# Patient Record
Sex: FEMALE | Race: WHITE | Employment: FULL TIME | URBAN - METROPOLITAN AREA
[De-identification: names, ages, dates, MRNs, and addresses within clinical notes are randomized per-mention and may not be internally consistent; named-entity substitution may affect disease eponyms.]

---

## 2018-03-08 LAB — PAP SMEAR: NORMAL

## 2018-09-12 ENCOUNTER — TELEPHONE (OUTPATIENT)
Dept: FAMILY MEDICINE CLINIC | Age: 53
End: 2018-09-12

## 2018-10-04 ENCOUNTER — OFFICE VISIT (OUTPATIENT)
Dept: FAMILY MEDICINE CLINIC | Age: 53
End: 2018-10-04
Payer: COMMERCIAL

## 2018-10-04 VITALS
OXYGEN SATURATION: 98 % | HEIGHT: 68 IN | TEMPERATURE: 97.2 F | DIASTOLIC BLOOD PRESSURE: 74 MMHG | BODY MASS INDEX: 28.49 KG/M2 | WEIGHT: 188 LBS | HEART RATE: 70 BPM | RESPIRATION RATE: 12 BRPM | SYSTOLIC BLOOD PRESSURE: 132 MMHG

## 2018-10-04 DIAGNOSIS — L72.3 SEBACEOUS CYST: ICD-10-CM

## 2018-10-04 DIAGNOSIS — Z13.220 SCREENING, LIPID: ICD-10-CM

## 2018-10-04 DIAGNOSIS — M50.30 DDD (DEGENERATIVE DISC DISEASE), CERVICAL: ICD-10-CM

## 2018-10-04 DIAGNOSIS — R73.9 HYPERGLYCEMIA: ICD-10-CM

## 2018-10-04 DIAGNOSIS — F41.8 DEPRESSION WITH ANXIETY: ICD-10-CM

## 2018-10-04 DIAGNOSIS — Z00.00 ROUTINE GENERAL MEDICAL EXAMINATION AT A HEALTH CARE FACILITY: Primary | ICD-10-CM

## 2018-10-04 DIAGNOSIS — K21.9 GASTROESOPHAGEAL REFLUX DISEASE WITHOUT ESOPHAGITIS: ICD-10-CM

## 2018-10-04 DIAGNOSIS — R87.610 ASCUS OF CERVIX WITH NEGATIVE HIGH RISK HPV: ICD-10-CM

## 2018-10-04 DIAGNOSIS — K58.2 IRRITABLE BOWEL SYNDROME WITH BOTH CONSTIPATION AND DIARRHEA: ICD-10-CM

## 2018-10-04 PROCEDURE — 99386 PREV VISIT NEW AGE 40-64: CPT | Performed by: FAMILY MEDICINE

## 2018-10-04 RX ORDER — HYOSCYAMINE SULFATE 0.125 MG
125 TABLET ORAL EVERY 4 HOURS PRN
COMMUNITY

## 2018-10-04 RX ORDER — M-VIT,TX,IRON,MINS/CALC/FOLIC 27MG-0.4MG
1 TABLET ORAL DAILY
COMMUNITY

## 2018-10-04 RX ORDER — LORAZEPAM 0.5 MG/1
0.5 TABLET ORAL EVERY 6 HOURS PRN
COMMUNITY
End: 2019-04-05 | Stop reason: SDUPTHER

## 2018-10-04 RX ORDER — CHLORAL HYDRATE 500 MG
3000 CAPSULE ORAL 2 TIMES DAILY
COMMUNITY

## 2018-10-04 RX ORDER — ESCITALOPRAM OXALATE 5 MG/1
5 TABLET ORAL DAILY
COMMUNITY
End: 2019-01-24 | Stop reason: SDUPTHER

## 2018-10-04 ASSESSMENT — PATIENT HEALTH QUESTIONNAIRE - PHQ9
2. FEELING DOWN, DEPRESSED OR HOPELESS: 0
1. LITTLE INTEREST OR PLEASURE IN DOING THINGS: 0
SUM OF ALL RESPONSES TO PHQ QUESTIONS 1-9: 0
SUM OF ALL RESPONSES TO PHQ9 QUESTIONS 1 & 2: 0
SUM OF ALL RESPONSES TO PHQ QUESTIONS 1-9: 0

## 2018-10-04 NOTE — PROGRESS NOTES
Here for initial eval, recently started working at [facility] with Dr. Yoan Greene and Dr. Randy Omer. Pt is overall doing well but dealing with moderate stressors as she is currently undergoing process of divorce and dealing with spousal support issues. Pt has been on lexapro chronically, and ativan prn. Pt struggles to sleep, and worse with staying asleep. Pt uses ativan 0.5mg prn and is helpful. Pt on lexapro 5mg and does find helpful. Pt was on brand wellbutrin. Except as noted in the history of present illness as above, the review of systems is negative for the following:    General ROS: negative  Psychological ROS: negative  Allergy and Immunology ROS: negative  Hematological and Lymphatic ROS: negative  Respiratory ROS: no cough, shortness of breath, or wheezing  Cardiovascular ROS: no chest pain or dyspnea on exertion  Gastrointestinal ROS: no abdominal pain, change in bowel habits, or black or bloody stools  Genito-Urinary ROS: no dysuria, trouble voiding, or hematuria  Musculoskeletal ROS: negative  Dermatological ROS: negative      Past medical, surgical, and social history reviewed and updated. Medications and allergies reviewed and updated      /74   Pulse 70   Temp 97.2 °F (36.2 °C) (Oral)   Resp 12   Ht 5' 8.25\" (1.734 m)   Wt 188 lb (85.3 kg)   SpO2 98%   Breastfeeding? No   BMI 28.38 kg/m²   General appearance - healthy, alert, no distress  Skin - Skin color, texture, turgor normal. No rashes or lesions. No suspicious findings  Head - Normocephalic. No masses, lesions, tenderness or abnormalities  Eyes - conjunctivae/corneas clear. Pupils equal and reactive to light and accomodation, extraocular muscles intact. Ears - External ears normal. Canals clear. Tympanic membranes normal bilaterally. Nose/Sinuses - Nares normal. Septum midline. Mucosa normal. No drainage or sinus tenderness. Oropharynx - Lips, mucosa, and tongue normal. Teeth and gums normal.   Neck - Neck supple.  No

## 2018-11-16 DIAGNOSIS — R73.9 HYPERGLYCEMIA: ICD-10-CM

## 2018-11-16 DIAGNOSIS — Z13.220 SCREENING, LIPID: ICD-10-CM

## 2018-11-16 LAB
A/G RATIO: 2.2 (ref 1.1–2.2)
ALBUMIN SERPL-MCNC: 4.3 G/DL (ref 3.4–5)
ALP BLD-CCNC: 73 U/L (ref 40–129)
ALT SERPL-CCNC: 12 U/L (ref 10–40)
ANION GAP SERPL CALCULATED.3IONS-SCNC: 12 MMOL/L (ref 3–16)
AST SERPL-CCNC: 13 U/L (ref 15–37)
BILIRUB SERPL-MCNC: 0.4 MG/DL (ref 0–1)
BUN BLDV-MCNC: 13 MG/DL (ref 7–20)
CALCIUM SERPL-MCNC: 9.3 MG/DL (ref 8.3–10.6)
CHLORIDE BLD-SCNC: 104 MMOL/L (ref 99–110)
CHOLESTEROL, TOTAL: 179 MG/DL (ref 0–199)
CO2: 25 MMOL/L (ref 21–32)
CREAT SERPL-MCNC: 0.8 MG/DL (ref 0.6–1.1)
GFR AFRICAN AMERICAN: >60
GFR NON-AFRICAN AMERICAN: >60
GLOBULIN: 2 G/DL
GLUCOSE BLD-MCNC: 100 MG/DL (ref 70–99)
HCT VFR BLD CALC: 42.7 % (ref 36–48)
HDLC SERPL-MCNC: 49 MG/DL (ref 40–60)
HEMOGLOBIN: 14.5 G/DL (ref 12–16)
LDL CHOLESTEROL CALCULATED: 114 MG/DL
MCH RBC QN AUTO: 33.1 PG (ref 26–34)
MCHC RBC AUTO-ENTMCNC: 34 G/DL (ref 31–36)
MCV RBC AUTO: 97.5 FL (ref 80–100)
PDW BLD-RTO: 12.8 % (ref 12.4–15.4)
PLATELET # BLD: 206 K/UL (ref 135–450)
PMV BLD AUTO: 9.7 FL (ref 5–10.5)
POTASSIUM SERPL-SCNC: 4.2 MMOL/L (ref 3.5–5.1)
RBC # BLD: 4.37 M/UL (ref 4–5.2)
SODIUM BLD-SCNC: 141 MMOL/L (ref 136–145)
TOTAL PROTEIN: 6.3 G/DL (ref 6.4–8.2)
TRIGL SERPL-MCNC: 79 MG/DL (ref 0–150)
TSH SERPL DL<=0.05 MIU/L-ACNC: 2.49 UIU/ML (ref 0.27–4.2)
VLDLC SERPL CALC-MCNC: 16 MG/DL
WBC # BLD: 7.2 K/UL (ref 4–11)

## 2018-11-17 LAB
ESTIMATED AVERAGE GLUCOSE: 114 MG/DL
HBA1C MFR BLD: 5.6 %

## 2018-11-21 ENCOUNTER — PATIENT MESSAGE (OUTPATIENT)
Dept: FAMILY MEDICINE CLINIC | Age: 53
End: 2018-11-21

## 2018-11-22 RX ORDER — FLUCONAZOLE 150 MG/1
150 TABLET ORAL ONCE
Qty: 1 TABLET | Refills: 0 | Status: SHIPPED | OUTPATIENT
Start: 2018-11-22 | End: 2018-11-22

## 2019-01-04 ENCOUNTER — HOSPITAL ENCOUNTER (OUTPATIENT)
Dept: MAMMOGRAPHY | Age: 54
Discharge: HOME | End: 2019-01-04
Payer: COMMERCIAL

## 2019-01-04 DIAGNOSIS — Z12.31 VISIT FOR SCREENING MAMMOGRAM: ICD-10-CM

## 2019-01-04 PROCEDURE — 77063 BREAST TOMOSYNTHESIS BI: CPT

## 2019-01-11 ENCOUNTER — OFFICE VISIT (OUTPATIENT)
Dept: GYNECOLOGY | Age: 54
End: 2019-01-11
Payer: COMMERCIAL

## 2019-01-11 ENCOUNTER — OFFICE VISIT (OUTPATIENT)
Dept: DERMATOLOGY | Age: 54
End: 2019-01-11
Payer: COMMERCIAL

## 2019-01-11 VITALS
HEART RATE: 64 BPM | BODY MASS INDEX: 28.88 KG/M2 | DIASTOLIC BLOOD PRESSURE: 69 MMHG | WEIGHT: 195 LBS | HEIGHT: 69 IN | SYSTOLIC BLOOD PRESSURE: 125 MMHG

## 2019-01-11 DIAGNOSIS — N93.9 ABNORMAL UTERINE BLEEDING: ICD-10-CM

## 2019-01-11 DIAGNOSIS — L70.0 ACNE VULGARIS: Primary | ICD-10-CM

## 2019-01-11 DIAGNOSIS — D22.9 MULTIPLE BENIGN MELANOCYTIC NEVI: ICD-10-CM

## 2019-01-11 DIAGNOSIS — B37.31 CANDIDA VAGINITIS: ICD-10-CM

## 2019-01-11 DIAGNOSIS — Z78.9 NON-TOBACCO USER: ICD-10-CM

## 2019-01-11 DIAGNOSIS — R68.89 UNWANTED HAIR: ICD-10-CM

## 2019-01-11 DIAGNOSIS — L57.8 PHOTOAGING OF SKIN: ICD-10-CM

## 2019-01-11 DIAGNOSIS — L98.8 RHYTIDES: ICD-10-CM

## 2019-01-11 DIAGNOSIS — R39.15 URINARY URGENCY: ICD-10-CM

## 2019-01-11 DIAGNOSIS — Z01.419 WELL WOMAN EXAM WITH ROUTINE GYNECOLOGICAL EXAM: Primary | ICD-10-CM

## 2019-01-11 LAB — FOLLICLE STIMULATING HORMONE: 46.5 MIU/ML

## 2019-01-11 PROCEDURE — 99386 PREV VISIT NEW AGE 40-64: CPT | Performed by: OBSTETRICS & GYNECOLOGY

## 2019-01-11 PROCEDURE — 99243 OFF/OP CNSLTJ NEW/EST LOW 30: CPT | Performed by: DERMATOLOGY

## 2019-01-11 RX ORDER — CLINDAMYCIN PHOSPHATE AND BENZOYL PEROXIDE 10; 50 MG/G; MG/G
GEL TOPICAL
Qty: 45 G | Refills: 2 | Status: SHIPPED | OUTPATIENT
Start: 2019-01-11 | End: 2021-01-28

## 2019-01-11 RX ORDER — FLUCONAZOLE 150 MG/1
TABLET ORAL
Qty: 3 TABLET | Refills: 0 | Status: SHIPPED | OUTPATIENT
Start: 2019-01-11 | End: 2019-04-05

## 2019-01-11 ASSESSMENT — ENCOUNTER SYMPTOMS
CHEST TIGHTNESS: 0
BLOOD IN STOOL: 0
ABDOMINAL PAIN: 0
NAUSEA: 0
COUGH: 0
TROUBLE SWALLOWING: 0
COLOR CHANGE: 0
ABDOMINAL DISTENTION: 0
WHEEZING: 0
SORE THROAT: 0
CONSTIPATION: 0
APNEA: 0
PHOTOPHOBIA: 0
BACK PAIN: 0
DIARRHEA: 0
VOMITING: 0
SHORTNESS OF BREATH: 0
RECTAL PAIN: 0
ANAL BLEEDING: 0

## 2019-01-13 LAB — URINE CULTURE, ROUTINE: NORMAL

## 2019-01-16 LAB
HPV COMMENT: NORMAL
HPV TYPE 16: NOT DETECTED
HPV TYPE 18: NOT DETECTED
HPVOH (OTHER TYPES): NOT DETECTED

## 2019-01-17 ENCOUNTER — TELEPHONE (OUTPATIENT)
Dept: GYNECOLOGY | Age: 54
End: 2019-01-17

## 2019-01-22 ENCOUNTER — HOSPITAL ENCOUNTER (OUTPATIENT)
Dept: GENERAL RADIOLOGY | Age: 54
Discharge: HOME | End: 2019-01-22
Payer: COMMERCIAL

## 2019-01-22 ENCOUNTER — TELEPHONE (OUTPATIENT)
Dept: FAMILY MEDICINE CLINIC | Age: 54
End: 2019-01-22

## 2019-01-22 ENCOUNTER — OFFICE VISIT (OUTPATIENT)
Dept: FAMILY MEDICINE CLINIC | Age: 54
End: 2019-01-22
Payer: COMMERCIAL

## 2019-01-22 ENCOUNTER — HOSPITAL ENCOUNTER (OUTPATIENT)
Age: 54
Discharge: HOME | End: 2019-01-22
Payer: COMMERCIAL

## 2019-01-22 VITALS
TEMPERATURE: 96.8 F | HEART RATE: 82 BPM | WEIGHT: 200 LBS | SYSTOLIC BLOOD PRESSURE: 138 MMHG | OXYGEN SATURATION: 95 % | BODY MASS INDEX: 29.62 KG/M2 | DIASTOLIC BLOOD PRESSURE: 71 MMHG | HEIGHT: 69 IN

## 2019-01-22 DIAGNOSIS — M79.672 LEFT FOOT PAIN: Primary | ICD-10-CM

## 2019-01-22 DIAGNOSIS — Z23 NEED FOR TDAP VACCINATION: ICD-10-CM

## 2019-01-22 DIAGNOSIS — M79.672 LEFT FOOT PAIN: ICD-10-CM

## 2019-01-22 PROCEDURE — 73630 X-RAY EXAM OF FOOT: CPT

## 2019-01-22 PROCEDURE — 99213 OFFICE O/P EST LOW 20 MIN: CPT | Performed by: FAMILY MEDICINE

## 2019-01-22 PROCEDURE — 90471 IMMUNIZATION ADMIN: CPT | Performed by: FAMILY MEDICINE

## 2019-01-22 PROCEDURE — 90715 TDAP VACCINE 7 YRS/> IM: CPT | Performed by: FAMILY MEDICINE

## 2019-01-22 NOTE — TELEPHONE ENCOUNTER
Pt stated the following:  bruise small toe of left foot. Was shoveling show this weekend past.  Hurt left foot. Pain wakes pt up at night  Pt needs to be advised on what to do.   Please call

## 2019-01-22 NOTE — TELEPHONE ENCOUNTER
Does she want to come in and have it evaluated?   Please advise  We wouldn't recommend xray without being evaluted  Please advise  thx

## 2019-01-24 ENCOUNTER — TELEPHONE (OUTPATIENT)
Dept: FAMILY MEDICINE CLINIC | Age: 54
End: 2019-01-24

## 2019-01-24 ENCOUNTER — HOSPITAL ENCOUNTER (OUTPATIENT)
Dept: ULTRASOUND IMAGING | Age: 54
Discharge: HOME | End: 2019-01-24
Payer: COMMERCIAL

## 2019-01-24 ENCOUNTER — PATIENT MESSAGE (OUTPATIENT)
Dept: FAMILY MEDICINE CLINIC | Age: 54
End: 2019-01-24

## 2019-01-24 DIAGNOSIS — M79.672 LEFT FOOT PAIN: Primary | ICD-10-CM

## 2019-01-24 DIAGNOSIS — N93.9 ABNORMAL UTERINE BLEEDING: ICD-10-CM

## 2019-01-24 DIAGNOSIS — R93.89 ABNORMAL X-RAY: ICD-10-CM

## 2019-01-24 DIAGNOSIS — M77.52 OS PERONEUM SYNDROME, LEFT: ICD-10-CM

## 2019-01-24 PROCEDURE — 76856 US EXAM PELVIC COMPLETE: CPT

## 2019-01-24 PROCEDURE — 76830 TRANSVAGINAL US NON-OB: CPT

## 2019-01-24 RX ORDER — ESCITALOPRAM OXALATE 5 MG/1
5 TABLET ORAL DAILY
Qty: 90 TABLET | Refills: 1 | Status: SHIPPED | OUTPATIENT
Start: 2019-01-24 | End: 2019-07-05 | Stop reason: SDUPTHER

## 2019-01-30 ENCOUNTER — TELEPHONE (OUTPATIENT)
Dept: FAMILY MEDICINE CLINIC | Age: 54
End: 2019-01-30

## 2019-02-13 ENCOUNTER — HOSPITAL ENCOUNTER (OUTPATIENT)
Age: 54
Discharge: HOME | End: 2019-02-13
Payer: COMMERCIAL

## 2019-02-13 ENCOUNTER — HOSPITAL ENCOUNTER (OUTPATIENT)
Dept: GENERAL RADIOLOGY | Age: 54
Discharge: HOME | End: 2019-02-13
Payer: COMMERCIAL

## 2019-02-13 DIAGNOSIS — M54.12 CERVICAL RADICULOPATHY: ICD-10-CM

## 2019-02-13 PROCEDURE — 72050 X-RAY EXAM NECK SPINE 4/5VWS: CPT

## 2019-04-05 ENCOUNTER — OFFICE VISIT (OUTPATIENT)
Dept: FAMILY MEDICINE CLINIC | Age: 54
End: 2019-04-05
Payer: COMMERCIAL

## 2019-04-05 VITALS
HEART RATE: 72 BPM | DIASTOLIC BLOOD PRESSURE: 73 MMHG | HEIGHT: 69 IN | TEMPERATURE: 96.7 F | OXYGEN SATURATION: 98 % | BODY MASS INDEX: 30.21 KG/M2 | WEIGHT: 204 LBS | RESPIRATION RATE: 8 BRPM | SYSTOLIC BLOOD PRESSURE: 134 MMHG

## 2019-04-05 DIAGNOSIS — M79.89 LEG SWELLING: ICD-10-CM

## 2019-04-05 DIAGNOSIS — F41.8 DEPRESSION WITH ANXIETY: Primary | ICD-10-CM

## 2019-04-05 DIAGNOSIS — F51.01 PRIMARY INSOMNIA: ICD-10-CM

## 2019-04-05 PROCEDURE — 99214 OFFICE O/P EST MOD 30 MIN: CPT | Performed by: FAMILY MEDICINE

## 2019-04-05 RX ORDER — SPIRONOLACTONE 25 MG/1
25 TABLET ORAL DAILY
Qty: 30 TABLET | Refills: 5 | Status: SHIPPED | OUTPATIENT
Start: 2019-04-05 | End: 2019-06-20

## 2019-04-05 RX ORDER — LORAZEPAM 0.5 MG/1
0.5 TABLET ORAL EVERY 6 HOURS PRN
Qty: 30 TABLET | Refills: 2 | Status: SHIPPED | OUTPATIENT
Start: 2019-04-05 | End: 2019-07-04

## 2019-04-05 RX ORDER — FLUTICASONE PROPIONATE 50 MCG
1 SPRAY, SUSPENSION (ML) NASAL DAILY
COMMUNITY
End: 2021-01-28

## 2019-04-05 RX ORDER — MELOXICAM 15 MG/1
15 TABLET ORAL DAILY
COMMUNITY
End: 2019-06-20

## 2019-04-05 NOTE — PROGRESS NOTES
Here for f/u and recheck of sleep issues, insomnia. Pt has had issues with sleep, related to moderate stressors. Pt has had sx worse with some stress. Pt struggles with falling asleep, and more staying asleep. When she wakes up, mind starts racing and struggles to fall back to sleep. Did try melatonin and did help a bit but struggles with weird dreams. Pt did have ativan and did help, uses nightly and rarely as needed during the day. Managing well during the day with her lexapro and feels doing well. Pt has had long standing hx of LE edema. Pt has dealt with issues chronically and has had significant testing on the heart and legs that were normal.  Pt has been on lasix but did not tolerate due to tinnitus, did try maxzide with +/- benefit. Pt denies any cp, shortness of breath. No fever, chills      Except as noted above in the history of present illness, the review of systems is  negative for headache, vision changes, chest pain, shortness of breath, abdominal pain, urinary sx, bowel changes. Past medical, surgical, and social history reviewed and updated  Medications and allergies reviewed and updated         O: /73   Pulse 72   Temp 96.7 °F (35.9 °C) (Oral)   Resp 8   Ht 5' 9\" (1.753 m)   Wt 204 lb (92.5 kg)   SpO2 98%   Breastfeeding? No   BMI 30.13 kg/m²   GEN: No acute distress, cooperative, well nourished, alert. HEENT: PEERLA, EOMI , normocephalic/atraumatic, nares and oropharynx clear. Mucous membranes normal, Tympanic membranes clear bilaterally. Neck: soft, supple, no thyromegaly, mass, no Lymphadenopathy  CV: Regular rate and rhythm, no murmur, rubs, gallops. No edema. Resp: Clear to auscultation bilaterally good air entry bilaterally  No crackles, wheeze. Breathing comfortably.    Psych: moderate stressors, denies any ST/SI      Wt Readings from Last 3 Encounters:   04/05/19 204 lb (92.5 kg)   01/22/19 200 lb (90.7 kg)   01/11/19 195 lb (88.5 kg)          Current Outpatient Medications   Medication Sig Dispense Refill    fluticasone (FLONASE) 50 MCG/ACT nasal spray 1 spray by Each Nare route daily      meloxicam (MOBIC) 15 MG tablet Take 15 mg by mouth daily      LORazepam (ATIVAN) 0.5 MG tablet Take 1 tablet by mouth every 6 hours as needed for Anxiety for up to 90 days. 30 tablet 2    spironolactone (ALDACTONE) 25 MG tablet Take 1 tablet by mouth daily 30 tablet 5    escitalopram (LEXAPRO) 5 MG tablet Take 1 tablet by mouth daily 90 tablet 1    Clindamycin-Benzoyl Per, Refr, (DUAC) 1.2-5 % GEL Apply thin layer to affected areas every morning, may bleach fabrics 45 g 2    Omega-3 Fatty Acids (FISH OIL) 1000 MG CAPS Take 3,000 mg by mouth 2 times daily      Multiple Vitamins-Minerals (THERAPEUTIC MULTIVITAMIN-MINERALS) tablet Take 1 tablet by mouth daily      hyoscyamine (ANASPAZ;LEVSIN) 125 MCG tablet Take 125 mcg by mouth every 4 hours as needed for Cramping      Probiotic Product (PROBIOTIC ADVANCED PO) Take by mouth daily       No current facility-administered medications for this visit. ASSESSMENT / PLAN:    1. Depression with anxiety  Mild increased sx d/t stressors  Discussed tx options. Will cont lexapro and cont prn ativan, use intermittent  See below    2. Primary insomnia  Add ativan 0.5mg QHS prn  Goal for tx of 90d or less  See [facility]  F/u increased sx  Has tried over the counter melatonin w/o help. Avoid trazodone d/t current SSRI therapy  - LORazepam (ATIVAN) 0.5 MG tablet; Take 1 tablet by mouth every 6 hours as needed for Anxiety for up to 90 days. Dispense: 30 tablet; Refill: 2    3. Leg swelling  Exam nonfocal, sx chronic  No sx to suggest CHF, renal insufficiency  Suspect chronic venous insufficiency. Trial aldactone 25mg qd  Discussed use, benefit, and side effects of prescribed medications. Barriers to medication compliance addressed. All patient questions answered. Pt voiced understanding.             Follow-up appointment: Call or return to clinic prn if these symptoms worsen or fail to improve as anticipated. Discussed use, benefit, and side effects of all prescribed medications. Barriers to medication compliance addressed. All patient questions answered. Pt voiced understanding. When applicable, [facility]'s [facility]. The patient has signed appropriate paperworks/consents.

## 2019-04-11 ENCOUNTER — EMPLOYEE WELLNESS (OUTPATIENT)
Dept: OTHER | Age: 54
End: 2019-04-11

## 2019-04-11 LAB
CHOLESTEROL, TOTAL: 230 MG/DL (ref 0–199)
GLUCOSE BLD-MCNC: 94 MG/DL (ref 70–99)
HDLC SERPL-MCNC: 53 MG/DL (ref 40–60)
LDL CHOLESTEROL CALCULATED: 150 MG/DL
TRIGL SERPL-MCNC: 134 MG/DL (ref 0–150)

## 2019-04-12 ENCOUNTER — PATIENT MESSAGE (OUTPATIENT)
Dept: FAMILY MEDICINE CLINIC | Age: 54
End: 2019-04-12

## 2019-04-12 ENCOUNTER — TELEPHONE (OUTPATIENT)
Dept: FAMILY MEDICINE CLINIC | Age: 54
End: 2019-04-12

## 2019-04-12 NOTE — TELEPHONE ENCOUNTER
From: Francisco Simmons  To: Zion Mcmahon MD  Sent: 4/12/2019 7:24 AM EDT  Subject: Visit Alpesh Van,  I went for my health screening yesterday and my BP was elevated. 142/90. They checked it 10 minutes later and it was actually higher. I wasn't feeling good when I woke up and I have a lot of stress right now. When I took it at work it was 145/88 and last night it was 148/87. This morning it was 142/88. I've been taking the spironolactone since Saturday but can't tell much difference. I took a Maxzide and Ativan when I got home to see if it would help. I still don't feel great this morning. Should I increase the spironolactone or add Maxzide? I've never had bp issues but it has been borderline. I feel anxious and my pulse is up. I can come and see you today if needed.      Thanks,  Shelby Vivas

## 2019-04-12 NOTE — TELEPHONE ENCOUNTER
/80 - 169/88. Started Spironolactone last week for edema. No answer when called. Left message to avoid salt and NSAIDS, drink plenty of fluids. Reassured BP is not dangerously high and can be monitored until Monday. Advised to call back with any cardiac symptoms or questions.

## 2019-04-12 NOTE — TELEPHONE ENCOUNTER
Last ov 4.5.19  ystrdy /90, 10 min later it was higher, evening 148/87, took maxzide & ativan when got home  Woke up this morning not feeling well, has a lot of stress right now, today 145/88  Pt taking spironolactone since Saturday, can't tell if it's helping  Pt asking if she should increase meds, if so which one?   Please advise

## 2019-04-15 VITALS — WEIGHT: 202 LBS | BODY MASS INDEX: 29.83 KG/M2

## 2019-05-22 NOTE — TELEPHONE ENCOUNTER
Anup Worthington calling from [facility] need clarification on medication clindamycin for patient pls return pharm rod discuss back @ [phone] thank you

## 2019-05-28 NOTE — TELEPHONE ENCOUNTER
Called [facility], they just needed the verbal confirmation that it was ok to switch from [place] to [place], since [facility] is no longer carrying [place]. Change was made.

## 2019-06-20 ENCOUNTER — OFFICE VISIT (OUTPATIENT)
Dept: FAMILY MEDICINE CLINIC | Age: 54
End: 2019-06-20
Payer: COMMERCIAL

## 2019-06-20 VITALS
DIASTOLIC BLOOD PRESSURE: 78 MMHG | HEART RATE: 70 BPM | WEIGHT: 203 LBS | TEMPERATURE: 95.8 F | HEIGHT: 69 IN | BODY MASS INDEX: 30.07 KG/M2 | RESPIRATION RATE: 12 BRPM | SYSTOLIC BLOOD PRESSURE: 144 MMHG | OXYGEN SATURATION: 100 %

## 2019-06-20 DIAGNOSIS — R73.9 HYPERGLYCEMIA: ICD-10-CM

## 2019-06-20 DIAGNOSIS — I10 ESSENTIAL HYPERTENSION: ICD-10-CM

## 2019-06-20 DIAGNOSIS — E78.00 HYPERCHOLESTEROLEMIA: ICD-10-CM

## 2019-06-20 DIAGNOSIS — F41.8 DEPRESSION WITH ANXIETY: Primary | ICD-10-CM

## 2019-06-20 PROCEDURE — 99214 OFFICE O/P EST MOD 30 MIN: CPT | Performed by: FAMILY MEDICINE

## 2019-06-20 RX ORDER — TRIAMTERENE AND HYDROCHLOROTHIAZIDE 37.5; 25 MG/1; MG/1
1 TABLET ORAL DAILY
Qty: 90 TABLET | Refills: 1 | Status: SHIPPED | OUTPATIENT
Start: 2019-06-20 | End: 2019-08-08 | Stop reason: SDUPTHER

## 2019-06-20 NOTE — PROGRESS NOTES
Here for f/u and recheck of blood pressure. Pt states that she has been tracking regularly and is seeing 140's consistently. Pt states that she has had a little bit of weight gain. Pt continues to deal with moderate stressors, does sleep ok. Pt does stress-eat. Pt is concerned with elevation of blood pressure. Does have some flushing. Pt did get started on spironolactone for bilateral lower extremity swelling and did not see much. Pt did take maxzide in the past.  Pt is watching sodium intake, and that has been helpful. Pt did increase dose of lexapro to 10mg. Pt would be interested in increasing back and change to night-time dosing. Pt took for a month at the higher dose but felt tired. Pt wants to also increase the exercise. Pt does have ativan, uses a few times a week when 'brain can't shut down'. Pt had trial of celexa as well as wellbutrin but didn't find much benefit. Except as noted above in the history of present illness, the review of systems is  negative for headache, vision changes, chest pain, shortness of breath, abdominal pain, urinary sx, bowel changes. Past medical, surgical, and social history reviewed and updated  Medications and allergies reviewed and updated           O: BP (!) 144/78   Pulse 70   Temp 95.8 °F (35.4 °C) (Oral)   Resp 12   Ht 5' 9\" (1.753 m)   Wt 203 lb (92.1 kg)   SpO2 100%   Breastfeeding? No   BMI 29.98 kg/m²   GEN: No acute distress, cooperative, well nourished, alert. HEENT: PEERLA, EOMI , normocephalic/atraumatic, nares and oropharynx clear. Mucous membranes normal, Tympanic membranes clear bilaterally. Neck: soft, supple, no thyromegaly, mass, no Lymphadenopathy  CV: Regular rate and rhythm, no murmur, rubs, gallops. No edema. Resp: Clear to auscultation bilaterally good air entry bilaterally  No crackles, wheeze. Breathing comfortably. Psych: mild breakthru dysthymia, moderate stressors.   Denies any ST/SI      Current Outpatient

## 2019-07-08 RX ORDER — ESCITALOPRAM OXALATE 5 MG/1
TABLET ORAL
Qty: 90 TABLET | Refills: 1 | Status: SHIPPED | OUTPATIENT
Start: 2019-07-08 | End: 2020-01-20 | Stop reason: SDUPTHER

## 2019-07-31 ENCOUNTER — NURSE ONLY (OUTPATIENT)
Dept: FAMILY MEDICINE CLINIC | Age: 54
End: 2019-07-31
Payer: COMMERCIAL

## 2019-07-31 DIAGNOSIS — Z23 NEED FOR SHINGLES VACCINE: Primary | ICD-10-CM

## 2019-07-31 PROCEDURE — 90750 HZV VACC RECOMBINANT IM: CPT | Performed by: FAMILY MEDICINE

## 2019-07-31 PROCEDURE — 90471 IMMUNIZATION ADMIN: CPT | Performed by: FAMILY MEDICINE

## 2019-08-08 ENCOUNTER — OFFICE VISIT (OUTPATIENT)
Dept: FAMILY MEDICINE CLINIC | Age: 54
End: 2019-08-08
Payer: COMMERCIAL

## 2019-08-08 VITALS
BODY MASS INDEX: 30.42 KG/M2 | RESPIRATION RATE: 16 BRPM | DIASTOLIC BLOOD PRESSURE: 84 MMHG | OXYGEN SATURATION: 97 % | HEART RATE: 79 BPM | SYSTOLIC BLOOD PRESSURE: 142 MMHG | WEIGHT: 206 LBS

## 2019-08-08 DIAGNOSIS — I10 ESSENTIAL HYPERTENSION: Primary | ICD-10-CM

## 2019-08-08 DIAGNOSIS — F41.8 DEPRESSION WITH ANXIETY: ICD-10-CM

## 2019-08-08 PROCEDURE — 99214 OFFICE O/P EST MOD 30 MIN: CPT | Performed by: FAMILY MEDICINE

## 2019-08-08 RX ORDER — TRIAMTERENE AND HYDROCHLOROTHIAZIDE 37.5; 25 MG/1; MG/1
1 TABLET ORAL DAILY
Qty: 90 TABLET | Refills: 3 | Status: SHIPPED | OUTPATIENT
Start: 2019-08-08 | End: 2020-09-02 | Stop reason: SDUPTHER

## 2019-08-08 NOTE — PROGRESS NOTES
Here for f/u and recheck of blood pressure. Pt has been monitoring her blood pressure regularly at work, and it is 130/80's. Pt does want to work on improving lifestyle, with improvement in diet/exercise. Pt is not doing any exercise. Pt was on lasix in the past but did not tolerate. Pt has been doing better with sodium intake and that has been going ok. Pt otherwise doin well, busy for the summer. Pt is busy working with Dr. Peter Mcmanus. He does do a lot of colonoscopy but does do a lot of surgery. Pt feels mood doing great, states she did not tolerate higher dose of lexapro but feels that mood is stable. No issues of ST/SI      Except as noted above in the history of present illness, the review of systems is  negative for headache, vision changes, chest pain, shortness of breath, abdominal pain, urinary sx, bowel changes. Past medical, surgical, and social history reviewed and updated  Medications and allergies reviewed and updated         O: BP (!) 142/84   Pulse 79   Resp 16   Wt 206 lb (93.4 kg)   SpO2 97%   Breastfeeding? No   BMI 30.42 kg/m²   GEN: No acute distress, cooperative, well nourished, alert. CV: Regular rate and rhythm, no murmur, rubs, gallops. No edema. Resp: Clear to auscultation bilaterally good air entry bilaterally  No crackles, wheeze. Breathing comfortably. Ext: no clubbing, cyanosis, edema   Psych: mood stable, No suicidal thoughts or ideation       ASSESSMENT / PLAN:    1. Essential hypertension  blood pressure elevated, recheck still high  Aware risk of persistent elevation of blood pressure  monitro with continued usage of dyazide and lifestyle mgt  Monitor and f/u 3 months and if still high will add therapy    2. Depression with anxiety  Mood stable, cont lexapro at 5mg qd           Follow-up appointment:   3 mos/prn    Discussed use, benefit, and side effects of all prescribed medications. Barriers to medication compliance addressed.   All patient questions

## 2019-11-05 ENCOUNTER — TELEPHONE (OUTPATIENT)
Dept: FAMILY MEDICINE CLINIC | Age: 54
End: 2019-11-05

## 2019-12-17 ENCOUNTER — TELEPHONE (OUTPATIENT)
Dept: GYNECOLOGY | Age: 54
End: 2019-12-17

## 2019-12-18 ENCOUNTER — TELEPHONE (OUTPATIENT)
Dept: PRIMARY CARE CLINIC | Age: 54
End: 2019-12-18

## 2020-01-06 ENCOUNTER — HOSPITAL ENCOUNTER (OUTPATIENT)
Dept: MAMMOGRAPHY | Age: 55
Discharge: HOME | End: 2020-01-06
Payer: COMMERCIAL

## 2020-01-06 PROCEDURE — 77063 BREAST TOMOSYNTHESIS BI: CPT

## 2020-01-20 RX ORDER — ESCITALOPRAM OXALATE 5 MG/1
TABLET ORAL
Qty: 90 TABLET | Refills: 1 | Status: SHIPPED | OUTPATIENT
Start: 2020-01-20 | End: 2020-09-02 | Stop reason: SDUPTHER

## 2020-01-20 RX ORDER — ESCITALOPRAM OXALATE 5 MG/1
TABLET ORAL
Qty: 90 TABLET | Refills: 1 | OUTPATIENT
Start: 2020-01-20

## 2020-02-14 DIAGNOSIS — R73.9 HYPERGLYCEMIA: ICD-10-CM

## 2020-02-14 DIAGNOSIS — Z00.00 ROUTINE GENERAL MEDICAL EXAMINATION AT A HEALTH CARE FACILITY: ICD-10-CM

## 2020-02-14 DIAGNOSIS — E78.00 HYPERCHOLESTEROLEMIA: ICD-10-CM

## 2020-02-14 LAB
A/G RATIO: 1.8 (ref 1.1–2.2)
ALBUMIN SERPL-MCNC: 4.4 G/DL (ref 3.4–5)
ALP BLD-CCNC: 78 U/L (ref 40–129)
ALT SERPL-CCNC: 28 U/L (ref 10–40)
ANION GAP SERPL CALCULATED.3IONS-SCNC: 15 MMOL/L (ref 3–16)
AST SERPL-CCNC: 17 U/L (ref 15–37)
BILIRUB SERPL-MCNC: 0.3 MG/DL (ref 0–1)
BUN BLDV-MCNC: 13 MG/DL (ref 7–20)
CALCIUM SERPL-MCNC: 9.5 MG/DL (ref 8.3–10.6)
CHLORIDE BLD-SCNC: 101 MMOL/L (ref 99–110)
CHOLESTEROL, TOTAL: 237 MG/DL (ref 0–199)
CO2: 25 MMOL/L (ref 21–32)
CREAT SERPL-MCNC: 0.9 MG/DL (ref 0.6–1.1)
GFR AFRICAN AMERICAN: >60
GFR NON-AFRICAN AMERICAN: >60
GLOBULIN: 2.5 G/DL
GLUCOSE BLD-MCNC: 118 MG/DL (ref 70–99)
HCT VFR BLD CALC: 44.5 % (ref 36–48)
HDLC SERPL-MCNC: 43 MG/DL (ref 40–60)
HEMOGLOBIN: 15.3 G/DL (ref 12–16)
LDL CHOLESTEROL CALCULATED: 158 MG/DL
MCH RBC QN AUTO: 32.7 PG (ref 26–34)
MCHC RBC AUTO-ENTMCNC: 34.3 G/DL (ref 31–36)
MCV RBC AUTO: 95.1 FL (ref 80–100)
PDW BLD-RTO: 12.9 % (ref 12.4–15.4)
PLATELET # BLD: 195 K/UL (ref 135–450)
PMV BLD AUTO: 9.3 FL (ref 5–10.5)
POTASSIUM SERPL-SCNC: 4.1 MMOL/L (ref 3.5–5.1)
RBC # BLD: 4.68 M/UL (ref 4–5.2)
SODIUM BLD-SCNC: 141 MMOL/L (ref 136–145)
TOTAL PROTEIN: 6.9 G/DL (ref 6.4–8.2)
TRIGL SERPL-MCNC: 179 MG/DL (ref 0–150)
TSH SERPL DL<=0.05 MIU/L-ACNC: 1.95 UIU/ML (ref 0.27–4.2)
VLDLC SERPL CALC-MCNC: 36 MG/DL
WBC # BLD: 5.5 K/UL (ref 4–11)

## 2020-02-15 LAB
ESTIMATED AVERAGE GLUCOSE: 116.9 MG/DL
HBA1C MFR BLD: 5.7 %

## 2020-02-18 ENCOUNTER — OFFICE VISIT (OUTPATIENT)
Dept: FAMILY MEDICINE CLINIC | Age: 55
End: 2020-02-18
Payer: COMMERCIAL

## 2020-02-18 VITALS
SYSTOLIC BLOOD PRESSURE: 132 MMHG | OXYGEN SATURATION: 97 % | HEART RATE: 80 BPM | TEMPERATURE: 97.7 F | DIASTOLIC BLOOD PRESSURE: 80 MMHG | BODY MASS INDEX: 32.19 KG/M2 | RESPIRATION RATE: 16 BRPM | WEIGHT: 218 LBS

## 2020-02-18 PROCEDURE — 99396 PREV VISIT EST AGE 40-64: CPT | Performed by: FAMILY MEDICINE

## 2020-02-18 RX ORDER — NALTREXONE HYDROCHLORIDE 50 MG/1
50 TABLET, FILM COATED ORAL DAILY
Qty: 30 TABLET | Refills: 5 | Status: ON HOLD | OUTPATIENT
Start: 2020-02-18 | End: 2020-10-29 | Stop reason: ALTCHOICE

## 2020-02-18 RX ORDER — BUPROPION HYDROCHLORIDE 150 MG/1
150 TABLET ORAL EVERY MORNING
Qty: 30 TABLET | Refills: 5 | Status: ON HOLD | OUTPATIENT
Start: 2020-02-18 | End: 2020-10-29 | Stop reason: ALTCHOICE

## 2020-02-18 NOTE — PROGRESS NOTES
Final    CO2 02/14/2020 25  21 - 32 mmol/L Final    Anion Gap 02/14/2020 15  3 - 16 Final    Glucose 02/14/2020 118* 70 - 99 mg/dL Final    BUN 02/14/2020 13  7 - 20 mg/dL Final    CREATININE 02/14/2020 0.9  0.6 - 1.1 mg/dL Final    GFR Non- 02/14/2020 >60  >60 Final    Comment: >60 mL/min/1.73m2 EGFR, calc. for ages 25 and older using the  MDRD formula (not corrected for weight), is valid for stable  renal function.  GFR  02/14/2020 >60  >60 Final    Comment: Chronic Kidney Disease: less than 60 ml/min/1.73 sq.m. Kidney Failure: less than 15 ml/min/1.73 sq.m. Results valid for patients 18 years and older.  Calcium 02/14/2020 9.5  8.3 - 10.6 mg/dL Final    Total Protein 02/14/2020 6.9  6.4 - 8.2 g/dL Final    Alb 02/14/2020 4.4  3.4 - 5.0 g/dL Final    Albumin/Globulin Ratio 02/14/2020 1.8  1.1 - 2.2 Final    Total Bilirubin 02/14/2020 0.3  0.0 - 1.0 mg/dL Final    Alkaline Phosphatase 02/14/2020 78  40 - 129 U/L Final    ALT 02/14/2020 28  10 - 40 U/L Final    AST 02/14/2020 17  15 - 37 U/L Final    Globulin 02/14/2020 2.5  g/dL Final    WBC 02/14/2020 5.5  4.0 - 11.0 K/uL Final    RBC 02/14/2020 4.68  4.00 - 5.20 M/uL Final    Hemoglobin 02/14/2020 15.3  12.0 - 16.0 g/dL Final    Hematocrit 02/14/2020 44.5  36.0 - 48.0 % Final    MCV 02/14/2020 95.1  80.0 - 100.0 fL Final    MCH 02/14/2020 32.7  26.0 - 34.0 pg Final    MCHC 02/14/2020 34.3  31.0 - 36.0 g/dL Final    RDW 02/14/2020 12.9  12.4 - 15.4 % Final    Platelets 06/27/2890 195  135 - 450 K/uL Final    MPV 02/14/2020 9.3  5.0 - 10.5 fL Final          ASSESSMENT / PLAN:    1. Routine general medical examination at a health care facility  No focal abnormalities on exam  Anticipatory guidance discussed. Reviewed bloodwork as above    2. Hyperglycemia  a1c high normal at 5.7  Monitor with diet/exercise and weight management  Doing better with lifestyle, will monitor    3.

## 2020-02-20 ENCOUNTER — OFFICE VISIT (OUTPATIENT)
Dept: GYNECOLOGY | Age: 55
End: 2020-02-20
Payer: COMMERCIAL

## 2020-02-20 VITALS
DIASTOLIC BLOOD PRESSURE: 84 MMHG | WEIGHT: 218.5 LBS | HEIGHT: 69 IN | RESPIRATION RATE: 16 BRPM | HEART RATE: 73 BPM | BODY MASS INDEX: 32.36 KG/M2 | SYSTOLIC BLOOD PRESSURE: 136 MMHG | OXYGEN SATURATION: 99 %

## 2020-02-20 LAB
BILIRUBIN, POC: NEGATIVE
BLOOD URINE, POC: NORMAL
CLARITY, POC: CLEAR
COLOR, POC: NORMAL
GLUCOSE URINE, POC: NEGATIVE
KETONES, POC: NEGATIVE
LEUKOCYTE EST, POC: NORMAL
NITRITE, POC: NEGATIVE
PH, POC: 7
PROTEIN, POC: NEGATIVE
SPECIFIC GRAVITY, POC: 1.01
UROBILINOGEN, POC: 0.2

## 2020-02-20 PROCEDURE — 81002 URINALYSIS NONAUTO W/O SCOPE: CPT | Performed by: OBSTETRICS & GYNECOLOGY

## 2020-02-20 PROCEDURE — 99396 PREV VISIT EST AGE 40-64: CPT | Performed by: OBSTETRICS & GYNECOLOGY

## 2020-02-20 ASSESSMENT — ENCOUNTER SYMPTOMS
DIARRHEA: 0
VOMITING: 0
NAUSEA: 0
ABDOMINAL PAIN: 0
SHORTNESS OF BREATH: 0
TROUBLE SWALLOWING: 0
COUGH: 0
RECTAL PAIN: 0
PHOTOPHOBIA: 0
CONSTIPATION: 0
ANAL BLEEDING: 0
BLOOD IN STOOL: 0
APNEA: 0
ABDOMINAL DISTENTION: 0
COLOR CHANGE: 0
SORE THROAT: 0
WHEEZING: 0
CHEST TIGHTNESS: 0
BACK PAIN: 0

## 2020-02-20 NOTE — PROGRESS NOTES
TABLET BY MOUTH ONE TIME A DAY 90 tablet 1    triamterene-hydrochlorothiazide (MAXZIDE-25) 37.5-25 MG per tablet Take 1 tablet by mouth daily 90 tablet 3    fluticasone (FLONASE) 50 MCG/ACT nasal spray 1 spray by Each Nare route daily      Clindamycin-Benzoyl Per, Refr, (DUAC) 1.2-5 % GEL Apply thin layer to affected areas every morning, may bleach fabrics 45 g 2    Omega-3 Fatty Acids (FISH OIL) 1000 MG CAPS Take 3,000 mg by mouth 2 times daily      Multiple Vitamins-Minerals (THERAPEUTIC MULTIVITAMIN-MINERALS) tablet Take 1 tablet by mouth daily      hyoscyamine (ANASPAZ;LEVSIN) 125 MCG tablet Take 125 mcg by mouth every 4 hours as needed for Cramping      Probiotic Product (PROBIOTIC ADVANCED PO) Take by mouth daily       Family History   Problem Relation Age of Onset    Cancer Father         kidney         Review of Systems:  Review of Systems   Constitutional: Negative for appetite change, chills, fatigue, fever and unexpected weight change. HENT: Negative for ear pain, hearing loss, mouth sores, sore throat and trouble swallowing. Eyes: Negative for photophobia and visual disturbance. Respiratory: Negative for apnea, cough, chest tightness, shortness of breath and wheezing. Cardiovascular: Negative for chest pain, palpitations and leg swelling. Gastrointestinal: Negative for abdominal distention, abdominal pain, anal bleeding, blood in stool, constipation, diarrhea, nausea, rectal pain and vomiting. Endocrine: Negative for cold intolerance, heat intolerance, polydipsia, polyphagia and polyuria. Genitourinary: Positive for dysuria. Negative for difficulty urinating, dyspareunia, enuresis, frequency, genital sores, hematuria, menstrual problem, pelvic pain, urgency, vaginal bleeding, vaginal discharge and vaginal pain. Musculoskeletal: Negative for arthralgias, back pain, joint swelling and myalgias. Skin: Negative for color change and rash.    Neurological: Negative for dizziness, seizures, syncope, light-headedness and headaches. Psychiatric/Behavioral: Negative for agitation, behavioral problems, confusion, decreased concentration, dysphoric mood, hallucinations, self-injury, sleep disturbance and suicidal ideas. The patient is not nervous/anxious and is not hyperactive. Physical Exam:  /84 (Site: Left Upper Arm, Position: Sitting, Cuff Size: Medium Adult)   Pulse 73   Resp 16   Ht 5' 9\" (1.753 m)   Wt 218 lb 8 oz (99.1 kg)   SpO2 99%   Breastfeeding No   BMI 32.27 kg/m²   BP Readings from Last 3 Encounters:   02/20/20 136/84   02/18/20 132/80   08/08/19 (!) 142/84     Body mass index is 32.27 kg/m². Physical Exam  Constitutional:       General: She is not in acute distress. Appearance: She is well-developed. HENT:      Head: Normocephalic and atraumatic. Mouth/Throat:      Pharynx: No oropharyngeal exudate. Eyes:      General: No scleral icterus. Right eye: No discharge. Left eye: No discharge. Conjunctiva/sclera: Conjunctivae normal.   Neck:      Thyroid: No thyromegaly. Cardiovascular:      Rate and Rhythm: Normal rate and regular rhythm. Heart sounds: Normal heart sounds. Pulmonary:      Effort: Pulmonary effort is normal.      Breath sounds: Normal breath sounds. Abdominal:      General: Bowel sounds are normal. There is no distension. Palpations: Abdomen is soft. There is no mass. Tenderness: There is no abdominal tenderness. There is no guarding or rebound. Genitourinary:     Labia:         Right: No rash, tenderness, lesion or injury. Left: No rash, tenderness, lesion or injury. Vagina: Normal. No signs of injury and foreign body. No vaginal discharge, erythema or tenderness. Cervix: No cervical motion tenderness, discharge or friability. Uterus: Not deviated, not enlarged, not fixed and not tender. Adnexa:         Right: No mass, tenderness or fullness.           Left: No mass, tenderness or fullness. Rectum: No mass or external hemorrhoid. Skin:     General: Skin is warm. Coloration: Skin is not pale. Findings: No erythema or rash. Neurological:      Mental Status: She is alert. Psychiatric:         Behavior: Behavior normal. Behavior is cooperative. Thought Content: Thought content normal.         Judgment: Judgment normal.           Assessment/Plan:  1. Well woman exam with routine gynecological exam  - PAP SMEAR  Self breast exam discussed and encouraged  Diet and exercise discussed  Discussed daily multi-vitamin and adequate calcium and vitamin D in diet  Annual screening mammogram recommended     2.  UTI symptoms  Will treat prn   - POCT Urinalysis no Micro  - URINE CULTURE      Return if symptoms worsen or fail to improve, for ANNUAL EXAM.

## 2020-02-21 LAB — URINE CULTURE, ROUTINE: NORMAL

## 2020-03-11 ENCOUNTER — TELEPHONE (OUTPATIENT)
Dept: FAMILY MEDICINE CLINIC | Age: 55
End: 2020-03-11

## 2020-05-20 ENCOUNTER — PATIENT MESSAGE (OUTPATIENT)
Dept: FAMILY MEDICINE CLINIC | Age: 55
End: 2020-05-20

## 2020-05-21 ENCOUNTER — PROCEDURE VISIT (OUTPATIENT)
Dept: SURGERY | Age: 55
End: 2020-05-21
Payer: COMMERCIAL

## 2020-05-21 PROCEDURE — 11400 EXC TR-EXT B9+MARG 0.5 CM<: CPT | Performed by: SURGERY

## 2020-05-21 PROCEDURE — 99201 PR OFFICE OUTPATIENT NEW 10 MINUTES: CPT | Performed by: SURGERY

## 2020-05-29 ENCOUNTER — VIRTUAL VISIT (OUTPATIENT)
Dept: FAMILY MEDICINE CLINIC | Age: 55
End: 2020-05-29
Payer: COMMERCIAL

## 2020-05-29 VITALS
HEART RATE: 73 BPM | WEIGHT: 215 LBS | BODY MASS INDEX: 31.75 KG/M2 | DIASTOLIC BLOOD PRESSURE: 82 MMHG | SYSTOLIC BLOOD PRESSURE: 140 MMHG

## 2020-05-29 PROCEDURE — 99214 OFFICE O/P EST MOD 30 MIN: CPT | Performed by: FAMILY MEDICINE

## 2020-05-29 RX ORDER — METHOCARBAMOL 750 MG/1
750 TABLET, FILM COATED ORAL 3 TIMES DAILY PRN
Qty: 30 TABLET | Refills: 2 | Status: SHIPPED | OUTPATIENT
Start: 2020-05-29 | End: 2021-06-03

## 2020-05-29 RX ORDER — METHYLPREDNISOLONE 4 MG/1
TABLET ORAL
Qty: 21 TABLET | Refills: 0 | Status: ON HOLD | OUTPATIENT
Start: 2020-05-29 | End: 2020-10-29

## 2020-05-29 RX ORDER — LORAZEPAM 0.5 MG/1
0.5 TABLET ORAL EVERY 8 HOURS PRN
Qty: 30 TABLET | Refills: 0 | Status: SHIPPED | OUTPATIENT
Start: 2020-05-29 | End: 2020-07-06 | Stop reason: SDUPTHER

## 2020-05-29 NOTE — PROGRESS NOTES
Normal  [] Abnormal -         Discharge []  None visible  [] Abnormal -    HENT:   [x] Normocephalic, atraumatic. [] Abnormal   [] Mouth/Throat: Mucous membranes are moist.     External Ears [] Normal  [] Abnormal-     Neck: [x] No visualized mass     Pulmonary/Chest: [x] Respiratory effort normal.  [x] No visualized signs of difficulty breathing or respiratory distress        [] Abnormal-      Musculoskeletal:   [] Normal gait with no signs of ataxia         [] Normal range of motion of neck        [] Abnormal-       Neurological:        [] No Facial Asymmetry (Cranial nerve 7 motor function) (limited exam to video visit)          [] No gaze palsy        [] Abnormal-         Skin:        [] No significant exanthematous lesions or discoloration noted on facial skin         [] Abnormal-            Psychiatric:       [] Normal Affect [x] No Hallucinations        [x] Abnormal- mild to moderate increased stressors d/t   Other pertinent observable physical exam findings-     Due to this being a TeleHealth encounter, evaluation of the following organ systems is limited: Vitals/Constitutional/EENT/Resp/CV/GI//MS/Neuro/Skin/Heme-Lymph-Imm. ASSESSMENT/PLAN:      1. Tension headache  D/t stressors  tx with Medrol dosekpak x 1 and robaxin TID prn   Monitor with range of motion exercises, and will f/u increased sx  Consider trigger pt injections    2. Primary insomnia  Flare of sx d/t stressors  Monitor as above, and with ativan TID prn  Use intermittent    3. Essential hypertension  blood pressure mildly elevated d/t stressors   Will monitor    4. Depression with anxiety  Moderate increased sx d/t stressors  Discussed tx options. Restarted lexapro 5mg qd  Short course ativan TID prn  refills given as below  See [facility]  - LORazepam (ATIVAN) 0.5 MG tablet; Take 1 tablet by mouth every 8 hours as needed for Anxiety for up to 30 days. Dispense: 30 tablet; Refill: 0    5.  Mastoid pain, right  Suspect d/t tension HA  The patient is reassured that these symptoms do not appear to represent a serious or threatening condition. Follow-up appointment:   2 wks for trigger pt injections in office    Discussed use, benefit, and side effects of all prescribed medications. Barriers to medication compliance addressed. All patient questions answered. Pt voiced understanding. When applicable, patient's outside records were reviewed through [facility]. The patient has signed appropriate paperworks/consents. An  electronic signature was used to authenticate this note. --Hillary Escalante MD on 5/29/2020 at 9:15 AM      Pursuant to the emergency declaration under the [facility], [facility] waiver authority and the [employer] Act, this Virtual  Visit was conducted, with patient's consent, to reduce the patient's risk of exposure to COVID-19 and provide continuity of care for an established patient. Services were provided through a video synchronous discussion virtually to substitute for in-person clinic visit.

## 2020-06-12 ENCOUNTER — OFFICE VISIT (OUTPATIENT)
Dept: FAMILY MEDICINE CLINIC | Age: 55
End: 2020-06-12
Payer: COMMERCIAL

## 2020-06-12 VITALS
DIASTOLIC BLOOD PRESSURE: 83 MMHG | RESPIRATION RATE: 16 BRPM | BODY MASS INDEX: 31.6 KG/M2 | WEIGHT: 214 LBS | OXYGEN SATURATION: 97 % | TEMPERATURE: 97.8 F | HEART RATE: 68 BPM | SYSTOLIC BLOOD PRESSURE: 139 MMHG

## 2020-06-12 PROCEDURE — 20552 NJX 1/MLT TRIGGER POINT 1/2: CPT | Performed by: FAMILY MEDICINE

## 2020-06-12 PROCEDURE — 99214 OFFICE O/P EST MOD 30 MIN: CPT | Performed by: FAMILY MEDICINE

## 2020-06-12 NOTE — PROGRESS NOTES
upper extremities. + trigger pt tenderness to R upper shoulder, cervical paraspinal area      ASSESSMENT / PLAN:    1. Essential hypertension  blood pressure stable @ goal, controlled    2. Trigger point of right shoulder region  A trigger point injection was performed at the site of maximal tenderness using 1% plain Lidocaine and Kenalog. This was well tolerated, and followed by immediate moderate relief of pain. - 20552 - NM INJECT TRIGGER POINT, 1 OR 2    3. Plantar fasciitis of right foot  Doing well s/p cortisone injection  Cont supportive therapy, heel cups, range of motion exercises    4. Mastoid pain, right  Resolved s/p steroid pack           Follow-up appointment:   Call or return to clinic prn if these symptoms worsen or fail to improve as anticipated. Discussed use, benefit, and side effects of all prescribed medications. Barriers to medication compliance addressed. All patient questions answered. Pt voiced understanding. When applicable, [facility]'s [facility]. The patient has signed appropriate paperworks/consents.

## 2020-07-03 ENCOUNTER — PATIENT MESSAGE (OUTPATIENT)
Dept: FAMILY MEDICINE CLINIC | Age: 55
End: 2020-07-03

## 2020-07-06 RX ORDER — LORAZEPAM 0.5 MG/1
0.5 TABLET ORAL EVERY 8 HOURS PRN
Qty: 30 TABLET | Refills: 0 | Status: SHIPPED | OUTPATIENT
Start: 2020-07-06 | End: 2020-08-05

## 2020-07-06 NOTE — TELEPHONE ENCOUNTER
From: Rhea Tavarez  To:  Cheyenne Mckeon MD  Sent: 7/3/2020  8:28 AM EDT  Subject: Jeffrey Creamer Dr. Carlos Donahue,    Due to continued stress and issues sleeping I would like a refill on my Ativan 0.5mg. / Bobymie Duel   [phone]    Thank you,  Rhea Tavarez

## 2020-07-29 ENCOUNTER — PATIENT MESSAGE (OUTPATIENT)
Dept: DERMATOLOGY | Age: 55
End: 2020-07-29

## 2020-07-30 NOTE — TELEPHONE ENCOUNTER
From: Shelia Kumar  To: Maddi Joiner DO  Sent: 7/29/2020 6:57 AM EDT  Subject: Visit Follow-Up Question    Hello,    I am overdue for a yearly skin check. Are you open or scheduling for routine visits?     Thank you,  Shelia Kumar

## 2020-09-03 RX ORDER — ESCITALOPRAM OXALATE 5 MG/1
TABLET ORAL
Qty: 90 TABLET | Refills: 1 | Status: SHIPPED | OUTPATIENT
Start: 2020-09-03 | End: 2021-03-15 | Stop reason: SDUPTHER

## 2020-09-03 RX ORDER — TRIAMTERENE AND HYDROCHLOROTHIAZIDE 37.5; 25 MG/1; MG/1
1 TABLET ORAL DAILY
Qty: 90 TABLET | Refills: 3 | Status: SHIPPED | OUTPATIENT
Start: 2020-09-03 | End: 2021-02-23

## 2020-09-03 NOTE — TELEPHONE ENCOUNTER
Requested Prescriptions     Pending Prescriptions Disp Refills    triamterene-hydroCHLOROthiazide (MAXZIDE-25) 37.5-25 MG per tablet 90 tablet 3     Sig: Take 1 tablet by mouth daily    escitalopram (LEXAPRO) 5 MG tablet 90 tablet 1     Sig: TAKE 1 TABLET BY MOUTH ONE TIME A DAY     Last ov 6/12/20  Last labs 2/14/20

## 2020-09-17 ENCOUNTER — HOSPITAL ENCOUNTER (OUTPATIENT)
Dept: ULTRASOUND IMAGING | Age: 55
Discharge: HOME | End: 2020-09-17
Payer: COMMERCIAL

## 2020-09-17 PROCEDURE — 76700 US EXAM ABDOM COMPLETE: CPT

## 2020-09-23 ENCOUNTER — OFFICE VISIT (OUTPATIENT)
Dept: FAMILY MEDICINE CLINIC | Age: 55
End: 2020-09-23
Payer: COMMERCIAL

## 2020-09-23 ENCOUNTER — TELEPHONE (OUTPATIENT)
Dept: FAMILY MEDICINE CLINIC | Age: 55
End: 2020-09-23

## 2020-09-23 VITALS
BODY MASS INDEX: 31.7 KG/M2 | OXYGEN SATURATION: 98 % | SYSTOLIC BLOOD PRESSURE: 149 MMHG | HEART RATE: 78 BPM | WEIGHT: 214 LBS | DIASTOLIC BLOOD PRESSURE: 72 MMHG | HEIGHT: 69 IN | TEMPERATURE: 97.1 F

## 2020-09-23 PROCEDURE — 99214 OFFICE O/P EST MOD 30 MIN: CPT | Performed by: FAMILY MEDICINE

## 2020-09-23 NOTE — PROGRESS NOTES
Ere for f/u of trigger pts, states that L sided sx have flared up a bit over the past few days, did feel related to some heavy lifting. Pain in L shoulder blade and up left neck. Moderate tightness. Pt has been using robaxin with help. No injury otherwise except playing golf. Pt leaving to go golf for the weekend tomorrow down to [place]. Pt here for follow up of blood pressure. Pt states doing great with adherence to therapy and feels well. No issues of chest pain, shortness of breath. No vision changes, headache, swelling in legs. Pt does track, and has been running on high end of normal    Stress level is high related to work stressors. Pt is busy scheduling for dr Luis Melo and Alan Richards. Pt did get some more help with scheduling and that has helped. Except as noted above in the history of present illness, the review of systems is  negative for headache, vision changes, chest pain, shortness of breath, abdominal pain, urinary sx, bowel changes. Past medical, surgical, and social history reviewed and updated  Medications and allergies reviewed and updated      . O: BP (!) 149/72 (Site: Right Upper Arm, Position: Sitting, Cuff Size: Medium Adult)   Pulse 78   Temp 97.1 °F (36.2 °C) (Temporal)   Ht 5' 9\" (1.753 m)   Wt 214 lb (97.1 kg)   SpO2 98%   BMI 31.60 kg/m²   GEN: No acute distress, cooperative, well nourished, alert. HEENT: PEERLA, EOMI , normocephalic/atraumatic, nares and oropharynx clear. Mucous membranes normal, Tympanic membranes clear bilaterally. Neck: soft, supple, no thyromegaly, mass, no Lymphadenopathy  CV: Regular rate and rhythm, no murmur, rubs, gallops. No edema. Resp: Clear to auscultation bilaterally good air entry bilaterally  No crackles, wheeze. Breathing comfortably. Musc: full range of motion bilateral upper extremities, moderate tender to palpation L       ASSESSMENT / PLAN:    1.  Essential hypertension  Elevated  Recheck still high, will increase dose of dyazide to 2 tabs a day and f/u 3-4wks for recheck of blood pressure  Will need f/u bloodwork to check lytes at that time  - Lipid Panel; Future  - Hemoglobin A1C; Future  - Comprehensive Metabolic Panel; Future    2. Hyperglycemia  At risk for diabetes mellitus  Check bloodwork for:  - Lipid Panel; Future  - Hemoglobin A1C; Future  - Comprehensive Metabolic Panel; Future    3. Hypercholesterolemia    - Lipid Panel; Future  - Hemoglobin A1C; Future  - Comprehensive Metabolic Panel; Future    4. Mass of left lobe of liver  Reviewed imaigng  Will check f/u MRI per GI    5. Trigger point of shoulder region, left  A trigger point injection was performed at the site of maximal tenderness using 1% plain Lidocaine and Kenalog. This was well tolerated, and followed by immediate relief of pain. - TRIGGER POINT 1 OR 2 MUSCLES; Future           Follow-up appointment:   3-4wks/prn    Discussed use, benefit, and side effects of all prescribed medications. Barriers to medication compliance addressed. All patient questions answered. Pt voiced understanding. When applicable, patient's outside records were reviewed through [facility]. The patient has signed appropriate paperworks/consents.

## 2020-10-06 RX ORDER — CLOTRIMAZOLE AND BETAMETHASONE DIPROPIONATE 10; .64 MG/G; MG/G
CREAM TOPICAL
Qty: 45 G | Refills: 0 | Status: SHIPPED | OUTPATIENT
Start: 2020-10-06 | End: 2021-01-28

## 2020-10-15 NOTE — PROGRESS NOTES
[facility]) Surgical Oncology  [facility] Sender    HPI: Dear Dr. Tyra Gamez, Thank you for referring Ms. Isaacs for management of a liver lesion. Patient was seen by Dr. Tyra Gamez for complaint's of intermittent diarrhea and bloating attributed to IBS and intermittent RUQ pain. An US abdomen was obtained showing a hyperechoic 30 mm lesion within the liver. An MRI of the liver showed an Interval enlargement of the mass 2.8 x 2.4. Patient is scheduled for an EGD on 10/29/20. Past Medical History:   Diagnosis Date    Abnormal Pap smear of cervix     DDD (degenerative disc disease), cervical     resolved s/p surgery    Depression with anxiety     Endometriosis     Essential hypertension 6/20/2019    GERD (gastroesophageal reflux disease)     Hypercholesterolemia     Hyperglycemia     Menopause ovarian failure     Spastic colon      Past Surgical History:   Procedure Laterality Date    APPENDECTOMY      CERVICAL LAMINECTOMY  2010    CHOLECYSTECTOMY      ENDOMETRIAL ABLATION      d/t bleeding    LAPAROSCOPY      x3 for endometriosis     Social:   Social History     Tobacco Use    Smoking status: Never Smoker    Smokeless tobacco: Never Used   Substance Use Topics    Alcohol use: Yes     Alcohol/week: 2.0 standard drinks     Types: 2 Glasses of wine per week     Comment: socially    Drug use: No     Family History   Problem Relation Age of Onset    Cancer Father         kidney     Allergies: Lasix [furosemide]; Naltrexone;  Oxycodone-acetaminophen; and Atorvastatin  Current Outpatient Medications   Medication Sig Dispense Refill    triamterene-hydroCHLOROthiazide (MAXZIDE-25) 37.5-25 MG per tablet Take 1 tablet by mouth daily 90 tablet 3    escitalopram (LEXAPRO) 5 MG tablet TAKE 1 TABLET BY MOUTH ONE TIME A DAY 90 tablet 1    methylPREDNISolone (MEDROL DOSEPACK) 4 MG tablet Take by mouth as directed in pack 21 tablet 0    buPROPion (WELLBUTRIN XL) 150 MG extended release tablet Take 1 tablet by mouth every morning 30 tablet 5    naltrexone (DEPADE) 50 MG tablet Take 1 tablet by mouth daily 30 tablet 5    Omega-3 Fatty Acids (FISH OIL) 1000 MG CAPS Take 3,000 mg by mouth 2 times daily      Multiple Vitamins-Minerals (THERAPEUTIC MULTIVITAMIN-MINERALS) tablet Take 1 tablet by mouth daily      hyoscyamine (ANASPAZ;LEVSIN) 125 MCG tablet Take 125 mcg by mouth every 4 hours as needed for Cramping      Probiotic Product (PROBIOTIC ADVANCED PO) Take by mouth daily      clotrimazole-betamethasone (LOTRISONE) 1-0.05 % cream Apply topically 2 times daily. (Patient not taking: Reported on 10/16/2020) 45 g 0    methocarbamol (ROBAXIN-750) 750 MG tablet Take 1 tablet by mouth 3 times daily as needed (muscle spasm) (Patient not taking: Reported on 10/16/2020) 30 tablet 2    fluticasone (FLONASE) 50 MCG/ACT nasal spray 1 spray by Each Nare route daily      Clindamycin-Benzoyl Per, Refr, (DUAC) 1.2-5 % GEL Apply thin layer to affected areas every morning, may bleach fabrics (Patient not taking: Reported on 10/16/2020) 45 g 2     No current facility-administered medications for this visit. Review of Systems: See HPI  All other systems reviewed and are negative. Vitals:    10/16/20 1403 10/16/20 1405   BP: (!) 148/86 (!) 140/80   Site: Right Upper Arm Right Upper Arm   Position: Sitting Sitting   Cuff Size: Medium Adult Medium Adult   Pulse: 92    Resp: 18    Temp: 98 °F (36.7 °C)    TempSrc: Oral    Height: 5' 9\" (1.753 m)      Wt Readings from Last 3 Encounters:   09/23/20 214 lb (97.1 kg)   06/12/20 214 lb (97.1 kg)   05/29/20 215 lb (97.5 kg)     Body mass index is 31.6 kg/m². Physical Exam:   Constitutional: She is oriented to person, place, and time. She appears well-developed and well-nourished. No distress. HENT: Moist mucus membranes  Head: Normocephalic and atraumatic. Eyes: EOM are normal. Pupils are equal, round, and no icterus. Neck: Normal range of motion. Neck supple.  No thyromegaly present. Cardiovascular: Normal rate and regular rhythm by peripheral pulses. Pulmonary/Chest: No respiratory distress. Bilateral symmetrical chest rise. Abdominal: Soft. She exhibits no distension and no mass. There is no hepatosplenomegaly. No tenderness. Extremities: She exhibits no bilateral edema. Lymphadenopathy: She has no bilateral cervical adenopathy. Neurological: Grossly intact motor and sensory exam.   Skin: Skin is warm and dry. Psychiatric: She has a normal mood and affect. Appropriate thought process and judgement capacity. US Abdomen 9/17/20:     Hyperechoic 30 mm lesion within the liver indeterminate. CT abdomen with contrast with hemangioma protocol versus MRI recommended.       No acute findings    MRI Abdomen 10/9/20:    1. Interval enlargement of mass 2.8 x 2.4 cm hemangioma anterolateral right lobe of liver. Few sub centimeter cysts. 2. Bilateral 1 cm and smaller renal cysts. 9 mm presumed hemorraghic cyst anterior upper pole left kidney minimally but not substantially larger. 3. Post cholecystectomy with no bile duct dilation.        Assessment/Plan:   Diagnosis Orders   1. Liver lesion       I discussed with the Nathaniel Mckoy about the diagnosis and management options. Based on the available information patient appears to have a liver lesion. I explained her about laparoscopic and open liver resection vs obtaining a biopsy or surviellence. Printed information was given. All the complications including bleeding, infection, clot's, pneumonia, heart attack and stroke were explained. Risks, benefits and alternatives of surgery explained to the patient. All the questions of the patient are answered to her apparent satisfaction. Patient verbalized understanding of the management plan. Imaging studies reviewed with the patient. At the time of office visit, plan is to go with observation. Follow up with Dr. Michelle Iglesias for EGD.     Dion Arrieta MD  Surgical Oncologist    Shahnaz Cruz RN, am scribing for and in the presence of Dr Noah Frazier. Radha Duenas RN  I, Dr. Noah Frazier, personally performed the services described in this documentation as scribed by Radha Duenas RN in my presence, and it is both accurate and complete.      Noah Frazier MD  Surgery Attending

## 2020-10-16 ENCOUNTER — OFFICE VISIT (OUTPATIENT)
Dept: SURGERY | Age: 55
End: 2020-10-16

## 2020-10-16 VITALS
HEIGHT: 69 IN | TEMPERATURE: 98 F | HEART RATE: 92 BPM | RESPIRATION RATE: 18 BRPM | SYSTOLIC BLOOD PRESSURE: 140 MMHG | DIASTOLIC BLOOD PRESSURE: 80 MMHG | BODY MASS INDEX: 31.6 KG/M2

## 2020-10-16 PROCEDURE — 99999 PR OFFICE/OUTPT VISIT,PROCEDURE ONLY: CPT | Performed by: SURGERY

## 2020-10-23 ENCOUNTER — NURSE ONLY (OUTPATIENT)
Dept: PRIMARY CARE CLINIC | Age: 55
End: 2020-10-23
Payer: COMMERCIAL

## 2020-10-23 PROCEDURE — 99211 OFF/OP EST MAY X REQ PHY/QHP: CPT | Performed by: NURSE PRACTITIONER

## 2020-10-26 LAB — SARS-COV-2, NAA: NOT DETECTED

## 2020-10-26 NOTE — RESULT ENCOUNTER NOTE

## 2020-10-28 ENCOUNTER — ANESTHESIA EVENT (OUTPATIENT)
Dept: ENDOSCOPY | Age: 55
End: 2020-10-28
Payer: COMMERCIAL

## 2020-10-29 ENCOUNTER — ANESTHESIA (OUTPATIENT)
Dept: ENDOSCOPY | Age: 55
End: 2020-10-29
Payer: COMMERCIAL

## 2020-10-29 ENCOUNTER — HOSPITAL ENCOUNTER (OUTPATIENT)
Age: 55
Setting detail: OUTPATIENT SURGERY
Discharge: HOME | End: 2020-10-29
Attending: INTERNAL MEDICINE | Admitting: INTERNAL MEDICINE
Payer: COMMERCIAL

## 2020-10-29 VITALS
RESPIRATION RATE: 16 BRPM | BODY MASS INDEX: 31.25 KG/M2 | TEMPERATURE: 96.8 F | OXYGEN SATURATION: 98 % | HEIGHT: 69 IN | SYSTOLIC BLOOD PRESSURE: 132 MMHG | WEIGHT: 211 LBS | DIASTOLIC BLOOD PRESSURE: 78 MMHG | HEART RATE: 65 BPM

## 2020-10-29 VITALS — DIASTOLIC BLOOD PRESSURE: 63 MMHG | OXYGEN SATURATION: 99 % | SYSTOLIC BLOOD PRESSURE: 132 MMHG

## 2020-10-29 PROCEDURE — 6360000002 HC RX W HCPCS: Performed by: NURSE ANESTHETIST, CERTIFIED REGISTERED

## 2020-10-29 PROCEDURE — 2709999900 HC NON-CHARGEABLE SUPPLY: Performed by: INTERNAL MEDICINE

## 2020-10-29 PROCEDURE — 3700000001 HC ADD 15 MINUTES (ANESTHESIA): Performed by: INTERNAL MEDICINE

## 2020-10-29 PROCEDURE — 2500000003 HC RX 250 WO HCPCS: Performed by: NURSE ANESTHETIST, CERTIFIED REGISTERED

## 2020-10-29 PROCEDURE — 3609012400 HC EGD TRANSORAL BIOPSY SINGLE/MULTIPLE: Performed by: INTERNAL MEDICINE

## 2020-10-29 PROCEDURE — 3700000000 HC ANESTHESIA ATTENDED CARE: Performed by: INTERNAL MEDICINE

## 2020-10-29 PROCEDURE — 2580000003 HC RX 258: Performed by: ANESTHESIOLOGY

## 2020-10-29 PROCEDURE — 7100000010 HC PHASE II RECOVERY - FIRST 15 MIN: Performed by: INTERNAL MEDICINE

## 2020-10-29 PROCEDURE — 7100000011 HC PHASE II RECOVERY - ADDTL 15 MIN: Performed by: INTERNAL MEDICINE

## 2020-10-29 PROCEDURE — 88305 TISSUE EXAM BY PATHOLOGIST: CPT

## 2020-10-29 RX ORDER — SODIUM CHLORIDE, SODIUM LACTATE, POTASSIUM CHLORIDE, CALCIUM CHLORIDE 600; 310; 30; 20 MG/100ML; MG/100ML; MG/100ML; MG/100ML
INJECTION, SOLUTION INTRAVENOUS CONTINUOUS
Status: DISCONTINUED | OUTPATIENT
Start: 2020-10-29 | End: 2020-10-29 | Stop reason: HOSPADM

## 2020-10-29 RX ORDER — LIDOCAINE HYDROCHLORIDE 20 MG/ML
INJECTION, SOLUTION INTRAVENOUS PRN
Status: DISCONTINUED | OUTPATIENT
Start: 2020-10-29 | End: 2020-10-29 | Stop reason: SDUPTHER

## 2020-10-29 RX ORDER — GLYCOPYRROLATE 0.2 MG/ML
INJECTION INTRAMUSCULAR; INTRAVENOUS PRN
Status: DISCONTINUED | OUTPATIENT
Start: 2020-10-29 | End: 2020-10-29 | Stop reason: SDUPTHER

## 2020-10-29 RX ORDER — PROPOFOL 10 MG/ML
INJECTION, EMULSION INTRAVENOUS CONTINUOUS PRN
Status: DISCONTINUED | OUTPATIENT
Start: 2020-10-29 | End: 2020-10-29 | Stop reason: SDUPTHER

## 2020-10-29 RX ORDER — ONDANSETRON 2 MG/ML
4 INJECTION INTRAMUSCULAR; INTRAVENOUS
Status: DISCONTINUED | OUTPATIENT
Start: 2020-10-29 | End: 2020-10-29 | Stop reason: HOSPADM

## 2020-10-29 RX ORDER — PROPOFOL 10 MG/ML
INJECTION, EMULSION INTRAVENOUS PRN
Status: DISCONTINUED | OUTPATIENT
Start: 2020-10-29 | End: 2020-10-29 | Stop reason: SDUPTHER

## 2020-10-29 RX ADMIN — PROPOFOL 50 MG: 10 INJECTION, EMULSION INTRAVENOUS at 07:37

## 2020-10-29 RX ADMIN — PROPOFOL 50 MG: 10 INJECTION, EMULSION INTRAVENOUS at 07:40

## 2020-10-29 RX ADMIN — PROPOFOL 100 MCG/KG/MIN: 10 INJECTION, EMULSION INTRAVENOUS at 07:37

## 2020-10-29 RX ADMIN — GLYCOPYRROLATE 0.2 MG: 0.2 INJECTION, SOLUTION INTRAMUSCULAR; INTRAVENOUS at 07:34

## 2020-10-29 RX ADMIN — LIDOCAINE HYDROCHLORIDE 100 MG: 20 INJECTION, SOLUTION INTRAVENOUS at 07:37

## 2020-10-29 RX ADMIN — SODIUM CHLORIDE, POTASSIUM CHLORIDE, SODIUM LACTATE AND CALCIUM CHLORIDE: 600; 310; 30; 20 INJECTION, SOLUTION INTRAVENOUS at 06:56

## 2020-10-29 ASSESSMENT — PULMONARY FUNCTION TESTS
PIF_VALUE: 0
PIF_VALUE: 1
PIF_VALUE: 0

## 2020-10-29 ASSESSMENT — PAIN - FUNCTIONAL ASSESSMENT
PAIN_FUNCTIONAL_ASSESSMENT: 0-10

## 2020-10-29 NOTE — ANESTHESIA POSTPROCEDURE EVALUATION
Department of Anesthesiology  Postprocedure Note    Patient: Becky Almodovar  MRN: [id number]  YOB: 1965  Date of evaluation: 10/29/2020  Time:  9:35 AM     Procedure Summary     Date:  10/29/20 Room / Location:  [facility]    Anesthesia Start:  9895 Anesthesia Stop:  0804    Procedure:  ESOPHAGOGASTRODUODENOSCOPY (N/A ) Diagnosis:       Gastroesophageal reflux disease, esophagitis presence not specified      (Gastroesophageal reflux disease, esophagitis presence not specified [K21.9])    Surgeon:  Enriqueta Hickman MD Responsible Provider:  Joan Melton DO    Anesthesia Type:  MAC ASA Status:  2          Anesthesia Type: MAC    Wilda Phase I: Wilda Score: 10    Wilda Phase II: Wilda Score: 10    Last vitals: Reviewed and per EMR flowsheets.        Anesthesia Post Evaluation    Patient location during evaluation: bedside  Patient participation: complete - patient participated  Level of consciousness: awake  Pain score: 0  Airway patency: patent  Nausea & Vomiting: no nausea and no vomiting  Complications: no  Cardiovascular status: blood pressure returned to baseline  Respiratory status: acceptable  Hydration status: euvolemic

## 2020-10-29 NOTE — ANESTHESIA POSTPROCEDURE EVALUATION
Department of Anesthesiology  Postprocedure Note    Patient: Stephanie Hunter  MRN: [id number]  YOB: 1965  Date of evaluation: 10/29/2020  Time:  9:36 AM     Procedure Summary     Date:  10/29/20 Room / Location:  [facility]    Anesthesia Start:  2245 Anesthesia Stop:  0804    Procedure:  ESOPHAGOGASTRODUODENOSCOPY (N/A ) Diagnosis:       Gastroesophageal reflux disease, esophagitis presence not specified      (Gastroesophageal reflux disease, esophagitis presence not specified [K21.9])    Surgeon:  Handy King MD Responsible Provider:  Meena Kamara DO    Anesthesia Type:  MAC ASA Status:  2          Anesthesia Type: MAC    Wilda Phase I: Wilda Score: 10    Wilda Phase II: Wilda Score: 10    Last vitals: Reviewed and per EMR flowsheets.        Anesthesia Post Evaluation    Patient location during evaluation: bedside  Patient participation: complete - patient participated  Pain score: 0  Airway patency: patent  Nausea & Vomiting: no nausea and no vomiting  Complications: no  Cardiovascular status: blood pressure returned to baseline  Respiratory status: acceptable  Hydration status: euvolemic

## 2020-10-29 NOTE — PROGRESS NOTES
Pt [facility] to [facility] alert and oriented x4, pt denies pain or nausea. VSS, pt states NPO since midnight. Pt instructed to call prior to getting up, call light in reach. Belongings placed in locker.

## 2020-10-29 NOTE — ANESTHESIA PRE PROCEDURE
Department of Anesthesiology  Preprocedure Note       Name:  Alexandra Torrez   Age:  50 y.o.  :  1965                                          MRN:  [id number]         Date:  10/29/2020      Surgeon: Dayana Baca):  Larence Sandifer, MD    Procedure: Procedure(s):  ESOPHAGOGASTRODUODENOSCOPY    Medications prior to admission:   Prior to Admission medications    Medication Sig Start Date End Date Taking? Authorizing Provider   triamterene-hydroCHLOROthiazide (MAXZIDE-25) 37.5-25 MG per tablet Take 1 tablet by mouth daily 9/3/20  Yes Lauren Cherry MD   escitalopram (LEXAPRO) 5 MG tablet TAKE 1 TABLET BY MOUTH ONE TIME A DAY 9/3/20  Yes Lauren Cherry MD   Omega-3 Fatty Acids (FISH OIL) 1000 MG CAPS Take 3,000 mg by mouth 2 times daily   Yes Historical Provider, MD   hyoscyamine (ANASPAZ;LEVSIN) 125 MCG tablet Take 125 mcg by mouth every 4 hours as needed for Cramping   Yes Historical Provider, MD   clotrimazole-betamethasone (LOTRISONE) 1-0.05 % cream Apply topically 2 times daily. Patient not taking: Reported on 10/16/2020 10/6/20   Tala Westfall MD   methocarbamol (ROBAXIN-750) 750 MG tablet Take 1 tablet by mouth 3 times daily as needed (muscle spasm) 20   Lauren Cherry MD   fluticasone Jackquline Sees) 50 MCG/ACT nasal spray 1 spray by Each Nare route daily    Historical Provider, MD   Clindamycin-Benzoyl Per, Refr, (DUAC) 1.2-5 % GEL Apply thin layer to affected areas every morning, may bleach fabrics 19   Trinity Asa, DO   Multiple Vitamins-Minerals (THERAPEUTIC MULTIVITAMIN-MINERALS) tablet Take 1 tablet by mouth daily    Historical Provider, MD   Probiotic Product (PROBIOTIC ADVANCED PO) Take by mouth daily    Historical Provider, MD       Current medications:    Current Facility-Administered Medications   Medication Dose Route Frequency Provider Last Rate Last Dose    lactated ringers infusion   Intravenous Continuous Lesa Guzman MD           Allergies:     Allergies Allergen Reactions    Lasix [Furosemide]      tinnitus      Naltrexone Nausea Only     Dizziness    Oxycodone-Acetaminophen Itching    Atorvastatin      myalgia         Problem List:    Patient Active Problem List   Diagnosis Code    Depression with anxiety F41.8    Spastic colon K58.9    Hyperglycemia R73.9    GERD (gastroesophageal reflux disease) K21.9    DDD (degenerative disc disease), cervical M50.30    Essential hypertension I10    Hypercholesterolemia E78.00       Past Medical History:        Diagnosis Date    Abnormal Pap smear of cervix     DDD (degenerative disc disease), cervical     resolved s/p surgery    Depression with anxiety     Endometriosis     Essential hypertension 6/20/2019    GERD (gastroesophageal reflux disease)     Hypercholesterolemia     Hyperglycemia     Menopause ovarian failure     Spastic colon        Past Surgical History:        Procedure Laterality Date    APPENDECTOMY      CERVICAL LAMINECTOMY  2010    CHOLECYSTECTOMY      ENDOMETRIAL ABLATION      d/t bleeding    LAPAROSCOPY      x3 for endometriosis       Social History:    Social History     Tobacco Use    Smoking status: Never Smoker    Smokeless tobacco: Never Used   Substance Use Topics    Alcohol use:  Yes     Alcohol/week: 2.0 standard drinks     Types: 2 Glasses of wine per week     Comment: socially                                Counseling given: Not Answered      Vital Signs (Current):   Vitals:    10/29/20 0641   BP: 130/74   Pulse: 73   Resp: 16   Temp: 98.2 °F (36.8 °C)   TempSrc: Oral   SpO2: 98%   Weight: 211 lb (95.7 kg)   Height: 5' 9\" (1.753 m)                                              BP Readings from Last 3 Encounters:   10/29/20 130/74   10/16/20 (!) 140/80   09/23/20 (!) 149/72       NPO Status: Time of last liquid consumption: 2359                        Time of last solid consumption: 1900                        Date of last liquid consumption: 10/28/20 psychiatric history: stable with treatment            GI/Hepatic/Renal:   (+) GERD: well controlled,           Endo/Other: Negative Endo/Other ROS                    Abdominal:       Abdomen: soft. Vascular: negative vascular ROS. Anesthesia Plan      MAC     ASA 2       Induction: intravenous. MIPS: Postoperative opioids intended and Prophylactic antiemetics administered. Anesthetic plan and risks discussed with patient. Use of blood products discussed with patient whom consented to blood products. Plan discussed with attending and CRNA.     Attending anesthesiologist reviewed and agrees with Pre Eval content              Hammad Almaguer DO   10/29/2020

## 2020-10-29 NOTE — PROCEDURES
[facility]  EGD REPORT    Patient:  Sheila Loaiza                  1965    Endoscopist: AMBER Atkins     Indication:  GERD, scant hematemesis     Medications:  MAC    Pre-Anesthesia Assessment:  I have reviewed and am in agreement with patient history and medication, including previous response to sedation. Prior to the procedure, a History and Physical was performed, and patient medications and allergies were reviewed. The patient is competent. The risks and benefits of the procedure and the sedation options and risks were discussed with the patient. Risks discussed included but were not limited to infection, bleeding, perforation, death, and missed lesions. All questions were answered and informed consent was obtained. Patient identification and proposed procedure were verified by the physician and the nurse in the pre-procedure area in the procedure room. [place]: II  ASA Grade Assessment: 2     After reviewing the risks and benefits, the patient was deemed in satisfactory condition to undergo the procedure. The anesthesia plan was to use MAC anesthesia. Immediately prior to administration of medications, the patient was re-assessed for adequacy to receive sedatives and a time out was performed. Patient and healthcare providers were in agreement it was the correct patient and procedure. The heart rate, respiratory rate, oxygen saturations, blood pressure, adequacy of pulmonary ventilation, and response to care were monitored throughout the procedure. The physical status of the patient was re-assessed after the procedure. After obtaining informed consent, the endoscope was passed under direct vision. The endoscope was introduced through the mouth, and advanced to the second part of duodenum. The EGD was accomplished without difficulty. The patient tolerated the procedure well.        Duodenum: Normal, biopsies done to evaluate for Celiac disease  Stomach: Mild antritis, biopsied. 2 5-6 mm polyps in body an fundus, biopsied. Retroflexion did not show a hiatal hernia. Esophagus: Grade B erosive esophagitis, biopsied  No Evidence of Mckoy's.  8-9 mm probable submucosal nodule at 21 cm, biopsied, similar size to 2005    Estimated blood loss trace    Plan:  Prilosec 20 mg a day  The patient knows it is their responsibility to call for biopsy results in 7 days

## 2020-10-29 NOTE — PROGRESS NOTES
Pt tolerated procedure well. Vital signs remain stable post procedure. Denies any pain or nausea. Reviewed written discharge instructions with pt. Verbalizes understanding. Denies any questions. [facility]. Friend to drive home.

## 2020-10-29 NOTE — H&P
[address] [place] [address]   Pre-operative History and Physical    Patient: Devin Kevin  : 1965     History Obtained From:  patient    HISTORY OF PRESENT ILLNESS:    The patient is a 50 y.o. female who presents for an EGD for GERD scant hematemesis  . Past Medical History:        Diagnosis Date    Abnormal Pap smear of cervix     DDD (degenerative disc disease), cervical     resolved s/p surgery    Depression with anxiety     Endometriosis     Essential hypertension 2019    GERD (gastroesophageal reflux disease)     Hypercholesterolemia     Hyperglycemia     Menopause ovarian failure     Spastic colon      Past Surgical History:        Procedure Laterality Date    APPENDECTOMY      CERVICAL LAMINECTOMY      CHOLECYSTECTOMY      ENDOMETRIAL ABLATION      d/t bleeding    LAPAROSCOPY      x3 for endometriosis     Medications Prior to Admission:   No current facility-administered medications on file prior to encounter.       Current Outpatient Medications on File Prior to Encounter   Medication Sig Dispense Refill    triamterene-hydroCHLOROthiazide (MAXZIDE-25) 37.5-25 MG per tablet Take 1 tablet by mouth daily 90 tablet 3    escitalopram (LEXAPRO) 5 MG tablet TAKE 1 TABLET BY MOUTH ONE TIME A DAY 90 tablet 1    Omega-3 Fatty Acids (FISH OIL) 1000 MG CAPS Take 3,000 mg by mouth 2 times daily      hyoscyamine (ANASPAZ;LEVSIN) 125 MCG tablet Take 125 mcg by mouth every 4 hours as needed for Cramping      methocarbamol (ROBAXIN-750) 750 MG tablet Take 1 tablet by mouth 3 times daily as needed (muscle spasm) 30 tablet 2    fluticasone (FLONASE) 50 MCG/ACT nasal spray 1 spray by Each Nare route daily      Clindamycin-Benzoyl Per, Refr, (DUAC) 1.2-5 % GEL Apply thin layer to affected areas every morning, may bleach fabrics 45 g 2    Multiple Vitamins-Minerals (THERAPEUTIC MULTIVITAMIN-MINERALS) tablet Take 1 tablet by mouth daily      Probiotic Product (PROBIOTIC ADVANCED PO)

## 2021-01-15 ENCOUNTER — HOSPITAL ENCOUNTER (OUTPATIENT)
Dept: WOMENS IMAGING | Age: 56
Discharge: HOME | End: 2021-01-15
Payer: COMMERCIAL

## 2021-01-15 DIAGNOSIS — Z12.31 VISIT FOR SCREENING MAMMOGRAM: ICD-10-CM

## 2021-01-15 PROCEDURE — 77063 BREAST TOMOSYNTHESIS BI: CPT

## 2021-01-20 ENCOUNTER — PATIENT MESSAGE (OUTPATIENT)
Dept: FAMILY MEDICINE CLINIC | Age: 56
End: 2021-01-20

## 2021-01-28 ENCOUNTER — TELEMEDICINE (OUTPATIENT)
Dept: FAMILY MEDICINE CLINIC | Age: 56
End: 2021-01-28
Payer: COMMERCIAL

## 2021-01-28 DIAGNOSIS — F41.8 DEPRESSION WITH ANXIETY: ICD-10-CM

## 2021-01-28 DIAGNOSIS — I10 ESSENTIAL HYPERTENSION: Primary | ICD-10-CM

## 2021-01-28 DIAGNOSIS — K29.00 ACUTE GASTRITIS WITHOUT HEMORRHAGE, UNSPECIFIED GASTRITIS TYPE: ICD-10-CM

## 2021-01-28 DIAGNOSIS — E78.00 HYPERCHOLESTEROLEMIA: ICD-10-CM

## 2021-01-28 DIAGNOSIS — R73.9 HYPERGLYCEMIA: ICD-10-CM

## 2021-01-28 DIAGNOSIS — F51.01 PRIMARY INSOMNIA: ICD-10-CM

## 2021-01-28 PROCEDURE — 99214 OFFICE O/P EST MOD 30 MIN: CPT | Performed by: FAMILY MEDICINE

## 2021-01-28 RX ORDER — OMEPRAZOLE 20 MG/1
20 CAPSULE, DELAYED RELEASE ORAL DAILY
COMMUNITY
End: 2022-01-06

## 2021-01-28 RX ORDER — TRAZODONE HYDROCHLORIDE 50 MG/1
50-100 TABLET ORAL NIGHTLY
Qty: 60 TABLET | Refills: 5 | Status: SHIPPED | OUTPATIENT
Start: 2021-01-28 | End: 2021-03-15 | Stop reason: SDUPTHER

## 2021-01-28 NOTE — PROGRESS NOTES
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 Pt states that it is stressful, with a new practice and setting up. Pt had wanted to move toward practice manager for a while. Pt started with new job about 3-4 weeks ago. Pt has had some issues with sleep. Pt states that she is using melatonin, and does help her to fall asleep but struggles to stay asleep. Pt was on ativan in the past and did help, pt does feel that lexapro does well but can't tolerate higher dose     Pt does have some pain behind R ear. Pt did see dentist and they felt was related to her chronic neck issues. Pt does use heat/ice and robaxin. Except as noted above in the history of present illness, the review of systems is  negative for headache, vision changes, chest pain, shortness of breath, abdominal pain, urinary sx, bowel changes. Past medical, surgical, and social history reviewed and updated  Medications and allergies reviewed and updated    1/28/2021    TELEHEALTH EVALUATION -- Audio/Visual (During RZJHW-09 public health emergency)      Due to COVID 19 outbreak, patient's office visit was converted to a virtual visit. Patient was contacted and agreed to proceed with a virtual visit via [address] Visit  The risks and benefits of converting to a virtual visit were discussed in light of the current infectious disease epidemic. Patient also understood that insurance coverage and co-pays are up to their individual insurance plans. As above    Review of Systems  Except as noted above in the history of present illness, the review of systems is  negative for headache, vision changes, chest pain, shortness of breath, abdominal pain, urinary sx, bowel changes.     Past medical, surgical, and social history reviewed and updated  Medications and allergies reviewed and updated        Social History     Tobacco Use    Smoking status: Never Smoker    Smokeless tobacco: Never Used   Substance Use Topics  Alcohol use: Yes     Alcohol/week: 2.0 standard drinks     Types: 2 Glasses of wine per week     Comment: socially    Drug use: No        Current Outpatient Medications   Medication Sig Dispense Refill    omeprazole (PRILOSEC) 20 MG delayed release capsule Take 20 mg by mouth daily      traZODone (DESYREL) 50 MG tablet Take 1-2 tablets by mouth nightly 60 tablet 5    triamterene-hydroCHLOROthiazide (MAXZIDE-25) 37.5-25 MG per tablet Take 1 tablet by mouth daily 90 tablet 3    escitalopram (LEXAPRO) 5 MG tablet TAKE 1 TABLET BY MOUTH ONE TIME A DAY 90 tablet 1    methocarbamol (ROBAXIN-750) 750 MG tablet Take 1 tablet by mouth 3 times daily as needed (muscle spasm) 30 tablet 2    Omega-3 Fatty Acids (FISH OIL) 1000 MG CAPS Take 3,000 mg by mouth 2 times daily      Multiple Vitamins-Minerals (THERAPEUTIC MULTIVITAMIN-MINERALS) tablet Take 1 tablet by mouth daily      hyoscyamine (ANASPAZ;LEVSIN) 125 MCG tablet Take 125 mcg by mouth every 4 hours as needed for Cramping      Probiotic Product (PROBIOTIC ADVANCED PO) Take by mouth daily       No current facility-administered medications for this visit. Allergies   Allergen Reactions    Lasix [Furosemide]      tinnitus      Naltrexone Nausea Only     Dizziness    Oxycodone-Acetaminophen Itching    Atorvastatin      myalgia          PHYSICAL EXAMINATION:    All boxes checked are findings on exam, empty boxes are negative or not evaluated during the examination  Limitation in exam due to use of video conferencing software, virtual visits    Vital Signs: (As obtained by patient/caregiver or practitioner observation)  There were no vitals taken for this visit.      Constitutional: [x] Appears well-developed and well-nourished [x] No apparent distress      [] Abnormal-   Mental status  [x] Alert and awake  [x] Oriented to person/place/time []Able to follow commands      Eyes:  EOM    []  Normal  [] Abnormal-  Sclera  []  Normal  [] Abnormal -

## 2021-02-02 ENCOUNTER — OFFICE VISIT (OUTPATIENT)
Dept: DERMATOLOGY | Age: 56
End: 2021-02-02
Payer: COMMERCIAL

## 2021-02-02 VITALS — TEMPERATURE: 98.9 F

## 2021-02-02 DIAGNOSIS — L81.4 SOLAR LENTIGINOSIS: ICD-10-CM

## 2021-02-02 DIAGNOSIS — D22.9 MULTIPLE BENIGN NEVI: ICD-10-CM

## 2021-02-02 DIAGNOSIS — L57.0 ACTINIC KERATOSES: Primary | ICD-10-CM

## 2021-02-02 DIAGNOSIS — L98.8 FACIAL RHYTIDS: ICD-10-CM

## 2021-02-02 DIAGNOSIS — L70.9 ADULT ACNE: ICD-10-CM

## 2021-02-02 PROCEDURE — 99214 OFFICE O/P EST MOD 30 MIN: CPT | Performed by: DERMATOLOGY

## 2021-02-02 PROCEDURE — 17000 DESTRUCT PREMALG LESION: CPT | Performed by: DERMATOLOGY

## 2021-02-02 PROCEDURE — 17003 DESTRUCT PREMALG LES 2-14: CPT | Performed by: DERMATOLOGY

## 2021-02-02 RX ORDER — CLINDAMYCIN AND BENZOYL PEROXIDE 10; 50 MG/G; MG/G
GEL TOPICAL
Qty: 50 G | Refills: 3 | Status: SHIPPED | OUTPATIENT
Start: 2021-02-02 | End: 2021-11-09

## 2021-02-02 NOTE — PATIENT INSTRUCTIONS

## 2021-02-02 NOTE — PROGRESS NOTES
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 Past Skin Hx:  -Patient reported history of dysplastic nevus to abdomen excised over 10 years ago by prior dermatologist (does not remember name)  -History of acne vulgaris-clindamycin/benzyl peroxide topical every morning  -History of facial rhytids- juvederm ultra XC perioral and melolabial folds, prn [place]  -hx of unwanted hair- electrolysis by provider prn   -hx of photoaging of skin/lentigines  Patient denies past history of melanoma, NMSC     PFHx: Denies hx of MM or NMSC    ADDITIONAL HISTORY:    I have reviewed past medical and surgical histories, current medications, allergies, social and family histories as documented in the patient's electronic medical record.     Family History   Problem Relation Age of Onset    Cancer Father         kidney     Past Medical History:   Diagnosis Date    Abnormal Pap smear of cervix     DDD (degenerative disc disease), cervical     resolved s/p surgery    Depression with anxiety     Endometriosis     Essential hypertension 6/20/2019    GERD (gastroesophageal reflux disease)     Hypercholesterolemia     Hyperglycemia     Menopause ovarian failure     Spastic colon      Past Surgical History:   Procedure Laterality Date    APPENDECTOMY      CERVICAL LAMINECTOMY  2010    CHOLECYSTECTOMY      ENDOMETRIAL ABLATION      d/t bleeding    LAPAROSCOPY      x3 for endometriosis    UPPER GASTROINTESTINAL ENDOSCOPY N/A 10/29/2020    EGD BIOPSY performed by Liz Spangler MD at [address] [place]   Allergen Reactions    Lasix [Furosemide]      tinnitus      Naltrexone Nausea Only     Dizziness    Oxycodone-Acetaminophen Itching    Atorvastatin      myalgia       Outpatient Medications Marked as Taking for the 2/2/21 encounter (Office Visit) with Wolf Merino, DO   Medication Sig Dispense Refill    clindamycin-benzoyl peroxide (BENZACLIN) 1-5 % gel Apply to affected areas on face every morning, may bleach fabrics. 50 g 3    tretinoin (RETIN-A) 0.025 % cream Apply a pea sized amount to the face every other night increasing to nightly as tolerated 45 g 3    omeprazole (PRILOSEC) 20 MG delayed release capsule Take 20 mg by mouth daily      traZODone (DESYREL) 50 MG tablet Take 1-2 tablets by mouth nightly 60 tablet 5    triamterene-hydroCHLOROthiazide (MAXZIDE-25) 37.5-25 MG per tablet Take 1 tablet by mouth daily 90 tablet 3    escitalopram (LEXAPRO) 5 MG tablet TAKE 1 TABLET BY MOUTH ONE TIME A DAY 90 tablet 1    methocarbamol (ROBAXIN-750) 750 MG tablet Take 1 tablet by mouth 3 times daily as needed (muscle spasm) 30 tablet 2    Omega-3 Fatty Acids (FISH OIL) 1000 MG CAPS Take 3,000 mg by mouth 2 times daily      Multiple Vitamins-Minerals (THERAPEUTIC MULTIVITAMIN-MINERALS) tablet Take 1 tablet by mouth daily      hyoscyamine (ANASPAZ;LEVSIN) 125 MCG tablet Take 125 mcg by mouth every 4 hours as needed for Cramping      Probiotic Product (PROBIOTIC ADVANCED PO) Take by mouth daily         Social History:   Social History     Socioeconomic History    Marital status:      Spouse name: Not on file    Number of children: Not on file    Years of education: Not on file    Highest education level: Not on file   Occupational History    Not on file   Social Needs    Financial resource strain: Not on file    Food insecurity     Worry: Not on file     Inability: Not on file    Transportation needs     Medical: Not on file     Non-medical: Not on file   Tobacco Use    Smoking status: Never Smoker    Smokeless tobacco: Never Used   Substance and Sexual Activity    Alcohol use:  Yes     Alcohol/week: 2.0 standard drinks     Types: 2 Glasses of wine per week     Comment: socially    Drug use: No    Sexual activity: Yes     Partners: Male   Lifestyle    Physical activity     Days per week: Not on file     Minutes per session: Not on file    Stress: Not on file   Relationships    Social connections     Talks on phone: Not on file     Gets together: Not on file     Attends Advent service: Not on file     Active member of club or organization: Not on file     Attends meetings of clubs or organizations: Not on file     Relationship status: Not on file    Intimate partner violence     Fear of current or ex partner: Not on file     Emotionally abused: Not on file     Physically abused: Not on file     Forced sexual activity: Not on file   Other Topics Concern    Not on file   Social History Narrative    Not on file       Physical Examination     The following were examined and determined to be normal: Psych/Neuro, Scalp/hair, Conjunctivae/eyelids, Gums/teeth/lips, Neck, Abdomen, Back, LUE, RLE, LLE and Nails/digits. buttocks. Areas covered by underwear garment(s) not examined. The following were examined and determined to be abnormal: Head/face, Breast/axilla/chest and RUE, groin     Mcguire phototype: 2    -Constitutional: Well appearing, no acute distress  -Neurological: Alert and oriented X 3  -Mood and Affect: Pleasant  Total body skin exam performed, areas examined listed above:   1. ill defined irreg shaped gritty keratotic pink macule(s) located to right dorsal hand and decolletage  2. Scattered on the head,neck, trunk and extremities are multiple well-defined round and oval symmetric smoothly-bordered uniformly brown macules and papules; no bleeding nevi. 3. several discrete and coalescing few 2-4 mm round uniformly brown macules scattered to face, neck, and sun exposed areas on torso and extremities  4. 2-3 inflammatory papules, 5-10 closed comedones, mildly oily skin.  No nodulocystic lesions. 5.  Upper cutaneous lip- linear static and dynamic rhytids    Assessment and Plan     1. Actinic keratoses    2. Adult acne    3. Multiple benign nevi    4. Solar lentiginosis    5. Facial rhytids        1. Actinic keratoses  -Edu re: relationship with chronic cumulative sun exposure, low premalignant potential.   Verbal consent obtained. -right dorsal hand and decolletage, 2 lesion(s) treated w/ liquid nitrogen x 1cycles, 3 seconds each located    Edu re: risk of blister formation, discomfort, scar, dyspigmentation. Discussed wound care. -Reviewed sun protective behavior -- sun avoidance during the peak hours of the day, sun-protective clothing (including hat and sunglasses), sunscreen use (water resistant, broad spectrum, SPF at least 30, need for reapplication every 2 to 3 hours). -Patient to contact office if AK fails to resolve despite treatment or concern for recurrence (discussed signs/symptoms to monitor for) or if patient develops side effect from therapy, such as unbearable blister, crusting, scabbing, redness, or tenderness. 2. Adult acne  -Mild to moderate severity, chronic course, not at treatment goal and worsened  I discussed the importance using mild gentle cleansers like OTC Cetaphil, washing face morning and night, moisturizers like OTC CeraVE with SPF 30 in AM and CeraVe PM moisturizer nightly, and cosmetics that are non-comedogenic. Patient advised to contact the office if acne worsens or fails to improve despite months of treatment, new scars, or significantly worsening cysts or nodules. May take 3-6 months to see significant improvement in acne    - clindamycin-benzoyl peroxide (BENZACLIN) 1-5 % gel; Apply to affected areas on face every morning, may bleach fabrics. educated regarding the potential for irritation and the risk of bleaching clothing/towels.       - tretinoin (RETIN-A) 0.025 % cream; Apply a pea sized amount to the face every other night increasing to nightly as tolerated  -Discussed Common side effects include: burning/stinging, redness, dryness, peeling and itching. These side effects typically decrease with continued use of the medication. Pt may use moisturizing creams or lotions to help reduce these side effects. If side effects continue, pt may decrease use of the medication to once every 2 to 3 days. STOP using this medication of patient becomes pregnant. 3. Multiple benign nevi  Benign acquired melanocytic nevi  -Recommend monthly self skin exams   -Educated regarding the ABCDEs of melanoma detection   -Call for any new/changing moles or concerning lesions  -Reviewed sun protective behavior -- sun avoidance during the peak hours of the day, sun-protective clothing (including hat and sunglasses), sunscreen use (water resistant, broad spectrum, SPF at least 30, need for reapplication every 1.5 to 2 hours), avoidance of tanning beds   -Plan: Observation with annual skin checks (earlier if indicated) performed in office to monitor current nevi and to assess for new lesions. 4. Solar lentiginosis  Solar lentigines  -Edu re: benignity, relationship w/ chronic cumulative sun exposure, darkening w/ unprotected sun exposure  -Reviewed sun protective behavior -- sun avoidance during the peak hours of the day, sun-protective clothing (including hat and sunglasses), sunscreen use (water resistant, broad spectrum, SPF at least 30, need for reapplication every 2 to 3 hours), avoidance of tanning beds   Observation, pt to call if changes in size, shape, color or experiences bleeding/pain/itching        5. Facial rhytids  Patient cosmetically bothered by rhytids to upper cutaneous lip.   Interested in additional treatment to soften the rhytids and help prevent worsening  Discussed risks v. Benefits of botox injections, including but not limited to pain at injection site, weakening of perioral muscles with difficulty talking, drinking/eating, etc., bruising,

## 2021-02-21 ENCOUNTER — PATIENT MESSAGE (OUTPATIENT)
Dept: FAMILY MEDICINE CLINIC | Age: 56
End: 2021-02-21

## 2021-02-23 RX ORDER — TRIAMTERENE AND HYDROCHLOROTHIAZIDE 75; 50 MG/1; MG/1
1 TABLET ORAL DAILY
Qty: 90 TABLET | Refills: 1 | Status: SHIPPED | OUTPATIENT
Start: 2021-02-23 | End: 2021-08-30

## 2021-02-25 DIAGNOSIS — I10 ESSENTIAL HYPERTENSION: ICD-10-CM

## 2021-02-25 DIAGNOSIS — R73.9 HYPERGLYCEMIA: ICD-10-CM

## 2021-02-25 DIAGNOSIS — E78.00 HYPERCHOLESTEROLEMIA: ICD-10-CM

## 2021-02-25 LAB
A/G RATIO: 1.8 (ref 1.1–2.2)
ALBUMIN SERPL-MCNC: 4.3 G/DL (ref 3.4–5)
ALP BLD-CCNC: 74 U/L (ref 40–129)
ALT SERPL-CCNC: 34 U/L (ref 10–40)
ANION GAP SERPL CALCULATED.3IONS-SCNC: 11 MMOL/L (ref 3–16)
AST SERPL-CCNC: 19 U/L (ref 15–37)
BILIRUB SERPL-MCNC: 0.3 MG/DL (ref 0–1)
BUN BLDV-MCNC: 14 MG/DL (ref 7–20)
CALCIUM SERPL-MCNC: 9.7 MG/DL (ref 8.3–10.6)
CHLORIDE BLD-SCNC: 98 MMOL/L (ref 99–110)
CHOLESTEROL, TOTAL: 268 MG/DL (ref 0–199)
CO2: 28 MMOL/L (ref 21–32)
CREAT SERPL-MCNC: 0.9 MG/DL (ref 0.6–1.1)
ESTIMATED AVERAGE GLUCOSE: 108.3 MG/DL
GFR AFRICAN AMERICAN: >60
GFR NON-AFRICAN AMERICAN: >60
GLOBULIN: 2.4 G/DL
GLUCOSE BLD-MCNC: 110 MG/DL (ref 70–99)
HBA1C MFR BLD: 5.4 %
HCT VFR BLD CALC: 44.1 % (ref 36–48)
HDLC SERPL-MCNC: 49 MG/DL (ref 40–60)
HEMOGLOBIN: 15.1 G/DL (ref 12–16)
LDL CHOLESTEROL CALCULATED: 193 MG/DL
MAGNESIUM: 2.2 MG/DL (ref 1.8–2.4)
MCH RBC QN AUTO: 32 PG (ref 26–34)
MCHC RBC AUTO-ENTMCNC: 34.2 G/DL (ref 31–36)
MCV RBC AUTO: 93.5 FL (ref 80–100)
PDW BLD-RTO: 12.9 % (ref 12.4–15.4)
PLATELET # BLD: 210 K/UL (ref 135–450)
PMV BLD AUTO: 9 FL (ref 5–10.5)
POTASSIUM SERPL-SCNC: 3.8 MMOL/L (ref 3.5–5.1)
RBC # BLD: 4.71 M/UL (ref 4–5.2)
SODIUM BLD-SCNC: 137 MMOL/L (ref 136–145)
TOTAL PROTEIN: 6.7 G/DL (ref 6.4–8.2)
TRIGL SERPL-MCNC: 132 MG/DL (ref 0–150)
TSH SERPL DL<=0.05 MIU/L-ACNC: 1.78 UIU/ML (ref 0.27–4.2)
VITAMIN D 25-HYDROXY: 26.4 NG/ML
VLDLC SERPL CALC-MCNC: 26 MG/DL
WBC # BLD: 5 K/UL (ref 4–11)

## 2021-02-26 ENCOUNTER — PATIENT MESSAGE (OUTPATIENT)
Dept: FAMILY MEDICINE CLINIC | Age: 56
End: 2021-02-26

## 2021-03-01 RX ORDER — ROSUVASTATIN CALCIUM 10 MG/1
10 TABLET, COATED ORAL NIGHTLY
Qty: 30 TABLET | Refills: 3 | Status: SHIPPED | OUTPATIENT
Start: 2021-03-01 | End: 2021-03-24 | Stop reason: SDUPTHER

## 2021-03-02 ENCOUNTER — TELEPHONE (OUTPATIENT)
Dept: DERMATOLOGY | Age: 56
End: 2021-03-02

## 2021-03-05 ENCOUNTER — TELEPHONE (OUTPATIENT)
Dept: FAMILY MEDICINE CLINIC | Age: 56
End: 2021-03-05

## 2021-03-05 ENCOUNTER — TELEPHONE (OUTPATIENT)
Dept: FAMILY MEDICINE CLINIC | Age: 56
End: 2021-03-05
Payer: COMMERCIAL

## 2021-03-05 ENCOUNTER — OFFICE VISIT (OUTPATIENT)
Dept: PRIMARY CARE CLINIC | Age: 56
End: 2021-03-05
Payer: COMMERCIAL

## 2021-03-05 DIAGNOSIS — J02.9 SORE THROAT: Primary | ICD-10-CM

## 2021-03-05 LAB
S PYO AG THROAT QL: NEGATIVE
S PYO AG THROAT QL: NORMAL

## 2021-03-05 PROCEDURE — 99211 OFF/OP EST MAY X REQ PHY/QHP: CPT | Performed by: NURSE PRACTITIONER

## 2021-03-05 PROCEDURE — 87880 STREP A ASSAY W/OPTIC: CPT | Performed by: FAMILY MEDICINE

## 2021-03-05 NOTE — TELEPHONE ENCOUNTER
Please advise  Pt called, requesting medication to alleviate the symptoms she is having of a fever, having issues swallowing and experiencing nausea. She called the office earlier today 3/5 and was advised to take a strep test. The strep test tested negative but she is still experiencing symptoms. She states that she is not feeling well. Please advise. Thanks. (Written by Rach Oconnor)            Kesha Fenton [address], [address]    Per Irene LPN    Patient is calling today stating she started 3-4 days ago with a sore throat and trouble swallowing. She feels as if she She has felt as if she may have a fever, not taken. She has a nauseated feeling w/o vomiting or diarrhea. Pended order for strep across the street at [facility].

## 2021-03-05 NOTE — TELEPHONE ENCOUNTER
Patient is calling today stating she started 3-4 days ago with a sore throat and trouble swallowing. She feels as if she She has felt as if she may have a fever, not taken. She has a nauseated feeling w/o vomiting or diarrhea. Pended order for strep across the street at [facility].

## 2021-03-07 LAB — S PYO THROAT QL CULT: NORMAL

## 2021-03-15 ENCOUNTER — OFFICE VISIT (OUTPATIENT)
Dept: FAMILY MEDICINE CLINIC | Age: 56
End: 2021-03-15
Payer: COMMERCIAL

## 2021-03-15 ENCOUNTER — NURSE ONLY (OUTPATIENT)
Dept: SURGERY | Age: 56
End: 2021-03-15

## 2021-03-15 VITALS
TEMPERATURE: 97.3 F | RESPIRATION RATE: 16 BRPM | DIASTOLIC BLOOD PRESSURE: 80 MMHG | WEIGHT: 223 LBS | SYSTOLIC BLOOD PRESSURE: 132 MMHG | HEIGHT: 69 IN | HEART RATE: 83 BPM | BODY MASS INDEX: 33.03 KG/M2 | OXYGEN SATURATION: 97 %

## 2021-03-15 DIAGNOSIS — F41.8 DEPRESSION WITH ANXIETY: ICD-10-CM

## 2021-03-15 DIAGNOSIS — Z92.29 S/P BOTOX INJECTION: Primary | ICD-10-CM

## 2021-03-15 DIAGNOSIS — R73.9 HYPERGLYCEMIA: ICD-10-CM

## 2021-03-15 DIAGNOSIS — E78.00 HYPERCHOLESTEROLEMIA: ICD-10-CM

## 2021-03-15 DIAGNOSIS — R16.0 MASS OF LEFT LOBE OF LIVER: ICD-10-CM

## 2021-03-15 DIAGNOSIS — I10 ESSENTIAL HYPERTENSION: ICD-10-CM

## 2021-03-15 DIAGNOSIS — E66.9 CLASS 1 OBESITY WITH SERIOUS COMORBIDITY AND BODY MASS INDEX (BMI) OF 32.0 TO 32.9 IN ADULT, UNSPECIFIED OBESITY TYPE: ICD-10-CM

## 2021-03-15 DIAGNOSIS — F43.9 STRESS: ICD-10-CM

## 2021-03-15 DIAGNOSIS — R00.2 PALPITATIONS: ICD-10-CM

## 2021-03-15 DIAGNOSIS — Z00.00 ROUTINE GENERAL MEDICAL EXAMINATION AT A HEALTH CARE FACILITY: Primary | ICD-10-CM

## 2021-03-15 DIAGNOSIS — J35.1 ENLARGED TONSILS: ICD-10-CM

## 2021-03-15 PROCEDURE — 93000 ELECTROCARDIOGRAM COMPLETE: CPT | Performed by: FAMILY MEDICINE

## 2021-03-15 PROCEDURE — 99396 PREV VISIT EST AGE 40-64: CPT | Performed by: FAMILY MEDICINE

## 2021-03-15 RX ORDER — ESCITALOPRAM OXALATE 5 MG/1
TABLET ORAL
Qty: 90 TABLET | Refills: 1 | Status: SHIPPED | OUTPATIENT
Start: 2021-03-15 | End: 2021-03-24 | Stop reason: SDUPTHER

## 2021-03-15 RX ORDER — TRAZODONE HYDROCHLORIDE 50 MG/1
50-100 TABLET ORAL NIGHTLY
Qty: 180 TABLET | Refills: 3 | Status: SHIPPED | OUTPATIENT
Start: 2021-03-15 | End: 2022-07-27 | Stop reason: SDUPTHER

## 2021-03-15 NOTE — PROGRESS NOTES
Here for well checkup, physical.  Pt states that she is doing ok but has noted that her blood pressure has continued to be elevated. Pt is taking her diuretic regularly. Pt does feel when her blood pressure is elevated. Pt is not consistent with her lifestyle, due to being busy at work. 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 Pt does feel that she is doing ok to work through the stress, does decompress over the weekends. Pt does know that the job will be different. Pt does feel that she can do better with her lifestyle. Here for f/u of cholesterol. Pt as been working on diet/exercise and is consistent with therapy. No side effects from current therapy. No chest pain, shortness of breath. No numbness/tingling in extremities  Pt has been tolerating the crestor, taking @ night. Pt here for follow up of blood pressure. Pt states doing great with adherence to therapy and feels well. No issues of chest pain, shortness of breath. No vision changes, headache, swelling in legs. Pt called a few days ago with sore throat issues. Pt called here and did get tested for strep, which was negative.   Sx are improved, does have large tonsils with + snoring, and + recurrent tonsil stones        Except as noted in the history of present illness as above, the review of systems is negative for the following:    General ROS: negative  Psychological ROS: negative  Allergy and Immunology ROS: negative  Hematological and Lymphatic ROS: negative  Respiratory ROS: no cough, shortness of breath, or wheezing  Cardiovascular ROS: no chest pain or dyspnea on exertion  Gastrointestinal ROS: no abdominal pain, change in bowel habits, or black or bloody stools  Genito-Urinary ROS: no dysuria, trouble voiding, or hematuria  Musculoskeletal ROS: negative  Dermatological ROS: negative      Past medical, surgical, and social history reviewed and updated. Medications and allergies reviewed and updated        /80   Pulse 83   Temp 97.3 °F (36.3 °C) (Oral)   Resp 16   Ht 5' 9\" (1.753 m) Comment: pt refused  Wt 223 lb (101.2 kg)   SpO2 97%   BMI 32.93 kg/m²   General appearance - healthy, alert, no distress  Skin - Skin color, texture, turgor normal. No rashes or lesions. No suspicious findings  Head - Normocephalic. No masses, lesions, tenderness or abnormalities  Eyes - conjunctivae/corneas clear. Pupils equal and reactive to light and accomodation, extraocular muscles intact. Ears - External ears normal. Canals clear. Tympanic membranes normal bilaterally. Nose/Sinuses - Nares normal. Septum midline. Mucosa normal. No drainage or sinus tenderness. Oropharynx - Lips, mucosa, and tongue normal. Teeth and gums normal.   Neck - Neck supple. No adenopathy. Thyroid symmetric, normal size, no carotid bruit bilaterally  Back - Back symmetric, no curvature. Range of motion normal. No Costovertebral angle tenderness. Lungs - Percussion normal. Good diaphragmatic excursion. Lungs clear without wheeze, rales, crackles  Heart - Regular rate and rhythm, with no rub, murmur or gallop noted. Abdomen - Abdomen soft, non-tender. Bowel sounds normal. No masses, tenderness or organomegaly  Extremities - Extremities normal. No deformities, edema, or skin discoloration  Musculoskeletal - Spine ROM normal. Muscular strength intact. Peripheral pulses - radial=4/4,, femoral=4/4, popliteal=4/4, dorsalis pedis=4/4,  Neuro - Gait normal. Reflexes normal and symmetric. Sensation grossly normal.  No focal weakness  Psych - euthymic, no suicidal thoughts or ideation, mood stable.       Current Outpatient Medications   Medication Sig Dispense Refill    escitalopram (LEXAPRO) 5 MG tablet TAKE 1 TABLET BY MOUTH ONE TIME A DAY 90 tablet 1    rosuvastatin (CRESTOR) 10 MG tablet Take 1 tablet by mouth nightly 30 tablet 3    triamterene-hydroCHLOROthiazide (MAXZIDE) 75-50 MG per tablet Take 1 tablet by mouth daily 90 tablet 1    clindamycin-benzoyl peroxide (BENZACLIN) 1-5 % gel Apply to affected areas on face every morning, may bleach fabrics. 50 g 3    tretinoin (RETIN-A) 0.025 % cream Apply a pea sized amount to the face every other night increasing to nightly as tolerated 45 g 3    omeprazole (PRILOSEC) 20 MG delayed release capsule Take 20 mg by mouth daily      traZODone (DESYREL) 50 MG tablet Take 1-2 tablets by mouth nightly 60 tablet 5    methocarbamol (ROBAXIN-750) 750 MG tablet Take 1 tablet by mouth 3 times daily as needed (muscle spasm) 30 tablet 2    Omega-3 Fatty Acids (FISH OIL) 1000 MG CAPS Take 3,000 mg by mouth 2 times daily      Multiple Vitamins-Minerals (THERAPEUTIC MULTIVITAMIN-MINERALS) tablet Take 1 tablet by mouth daily      hyoscyamine (ANASPAZ;LEVSIN) 125 MCG tablet Take 125 mcg by mouth every 4 hours as needed for Cramping      Probiotic Product (PROBIOTIC ADVANCED PO) Take by mouth daily       No current facility-administered medications for this visit. ASSESSMENT / PLAN:    1. Routine general medical examination at a health care facility  No focal abnormalities on exam  Anticipatory guidance discussed. 2. Essential hypertension  blood pressure stable, @ goal, controlled  Cont diuretics  Encouraged diet/exercise, weight reduction  - EKG 12 Lead    3. Depression with anxiety  Mood stable despite stressors  Cont current therapy with lexapro, supportive therapy    4. Hyperglycemia  a1c normal    5. Hypercholesterolemia  Moderate to sig elevation of cholesterol, recent started on crestor and is tolerating well. Check bloodwork 3 months    6. Stress  Doing ok despite stressors  Cont to monitor with supportive therapy    7. Class 1 obesity with serious comorbidity and body mass index (BMI) of 32.0 to 32.9 in adult, unspecified obesity type  Encouraged improvement in diet/exercise, will monitor    8.  Palpitations  EKG

## 2021-03-15 NOTE — PROGRESS NOTES
[facility]     CC: Facial rhytids     HPI: This is a 56y. o.female with a PMHx as delineated below who presents with the above described complaints.                   FHx: Reviewed and is noncontributory     Use of anticoagulation: aspirin which she stopped 5 days prior   Prior injections: no complications with injections: None  Herpetic outbreak: No;           EXAM     FACE: Transverse frontalis rhytids: Severe              Glabellar rhytids: Severe              Periorbital rhytids: Severe              Nasolabial folds: Moderate              Perioral rhytids: Moderate              Marionette lines: Moderate     IMP: 56 y. o.female with facial aging who presents with desire for nonsurgical intervention  PLAN: 12 units of Botox administered to the glabella, forehead, and periorbita.  Lot number of Botox: [id number]; [address] code: [phone] [id number]; date of expiration: 08/23.     PROCEDURE NOTE     The patient signed informed consent discussing the risks (not limited to bleeding and or bruising, infection, allergy, unacceptable cosmetic appearance, ptosis, drooling, blindness, and even death) were discussed in detail with the patient. Farzaneh Simmons all questions were answered in a satisfactory manner as deemed by the patient, she agreed to proceed.     The patient was prepped with ice packs and alcohol pads. Injections were performed in the standard fashion according to the plan as delineated above. The patient tolerated the injections without difficulty.   There were no immediate complications.

## 2021-04-16 ENCOUNTER — OFFICE VISIT (OUTPATIENT)
Dept: GYNECOLOGY | Age: 56
End: 2021-04-16
Payer: COMMERCIAL

## 2021-04-16 VITALS
SYSTOLIC BLOOD PRESSURE: 124 MMHG | HEART RATE: 87 BPM | TEMPERATURE: 97.6 F | HEIGHT: 69 IN | WEIGHT: 210 LBS | OXYGEN SATURATION: 97 % | BODY MASS INDEX: 31.1 KG/M2 | DIASTOLIC BLOOD PRESSURE: 73 MMHG

## 2021-04-16 DIAGNOSIS — Z01.419 WELL WOMAN EXAM WITH ROUTINE GYNECOLOGICAL EXAM: Primary | ICD-10-CM

## 2021-04-16 PROCEDURE — 99396 PREV VISIT EST AGE 40-64: CPT | Performed by: OBSTETRICS & GYNECOLOGY

## 2021-04-16 ASSESSMENT — ENCOUNTER SYMPTOMS
RECTAL PAIN: 0
WHEEZING: 0
TROUBLE SWALLOWING: 0
CONSTIPATION: 0
ABDOMINAL DISTENTION: 0
DIARRHEA: 0
SHORTNESS OF BREATH: 0
PHOTOPHOBIA: 0
BACK PAIN: 0
VOMITING: 0
NAUSEA: 0
COLOR CHANGE: 0
COUGH: 0
CHEST TIGHTNESS: 0
ABDOMINAL PAIN: 0
APNEA: 0
BLOOD IN STOOL: 0
ANAL BLEEDING: 0
SORE THROAT: 0

## 2021-04-16 NOTE — PROGRESS NOTES
90 tablet 1    escitalopram (LEXAPRO) 5 MG tablet TAKE 1 TABLET BY MOUTH ONE TIME A DAY (Patient taking differently: 10 mg TAKE 1 TABLET BY MOUTH ONE TIME A DAY) 90 tablet 1    traZODone (DESYREL) 50 MG tablet Take 1-2 tablets by mouth nightly 180 tablet 3    clindamycin-benzoyl peroxide (BENZACLIN) 1-5 % gel Apply to affected areas on face every morning, may bleach fabrics. 50 g 3    tretinoin (RETIN-A) 0.025 % cream Apply a pea sized amount to the face every other night increasing to nightly as tolerated 45 g 3    omeprazole (PRILOSEC) 20 MG delayed release capsule Take 20 mg by mouth daily      methocarbamol (ROBAXIN-750) 750 MG tablet Take 1 tablet by mouth 3 times daily as needed (muscle spasm) 30 tablet 2    Omega-3 Fatty Acids (FISH OIL) 1000 MG CAPS Take 3,000 mg by mouth 2 times daily      Multiple Vitamins-Minerals (THERAPEUTIC MULTIVITAMIN-MINERALS) tablet Take 1 tablet by mouth daily      hyoscyamine (ANASPAZ;LEVSIN) 125 MCG tablet Take 125 mcg by mouth every 4 hours as needed for Cramping      Probiotic Product (PROBIOTIC ADVANCED PO) Take by mouth daily       Family History   Problem Relation Age of Onset    Cancer Father         kidney         Review of Systems:  Review of Systems   Constitutional: Negative for appetite change, chills, fatigue, fever and unexpected weight change. HENT: Negative for ear pain, hearing loss, mouth sores, sore throat and trouble swallowing. Eyes: Negative for photophobia and visual disturbance. Respiratory: Negative for apnea, cough, chest tightness, shortness of breath and wheezing. Cardiovascular: Negative for chest pain, palpitations and leg swelling. Gastrointestinal: Negative for abdominal distention, abdominal pain, anal bleeding, blood in stool, constipation, diarrhea, nausea, rectal pain and vomiting. Endocrine: Negative for cold intolerance, heat intolerance, polydipsia, polyphagia and polyuria.    Genitourinary: Negative for difficulty urinating, dyspareunia, dysuria, enuresis, frequency, genital sores, hematuria, menstrual problem, pelvic pain, urgency, vaginal bleeding, vaginal discharge and vaginal pain. Musculoskeletal: Negative for arthralgias, back pain, joint swelling and myalgias. Skin: Negative for color change and rash. Neurological: Negative for dizziness, seizures, syncope, light-headedness and headaches. Psychiatric/Behavioral: Negative for agitation, behavioral problems, confusion, decreased concentration, dysphoric mood, hallucinations, self-injury, sleep disturbance and suicidal ideas. The patient is not nervous/anxious and is not hyperactive. Physical Exam:  /73   Pulse 87   Temp 97.6 °F (36.4 °C) (Temporal)   Ht 5' 9\" (1.753 m)   Wt 210 lb (95.3 kg)   SpO2 97%   Breastfeeding No   BMI 31.01 kg/m²   BP Readings from Last 3 Encounters:   04/16/21 124/73   03/15/21 132/80   10/29/20 132/63     Body mass index is 31.01 kg/m². Physical Exam  Constitutional:       General: She is not in acute distress. Appearance: She is well-developed. HENT:      Head: Normocephalic and atraumatic. Mouth/Throat:      Pharynx: No oropharyngeal exudate. Eyes:      General: No scleral icterus. Right eye: No discharge. Left eye: No discharge. Conjunctiva/sclera: Conjunctivae normal.   Neck:      Thyroid: No thyromegaly. Cardiovascular:      Rate and Rhythm: Normal rate and regular rhythm. Heart sounds: Normal heart sounds. Pulmonary:      Effort: Pulmonary effort is normal.      Breath sounds: Normal breath sounds. Abdominal:      General: Bowel sounds are normal. There is no distension. Palpations: Abdomen is soft. There is no mass. Tenderness: There is no abdominal tenderness. There is no guarding or rebound. Genitourinary:     Labia:         Right: No rash, tenderness, lesion or injury. Left: No rash, tenderness, lesion or injury.        Vagina: Normal. No signs of injury and foreign body. No vaginal discharge, erythema or tenderness. Cervix: No cervical motion tenderness, discharge or friability. Uterus: Not deviated, not enlarged, not fixed and not tender. Adnexa:         Right: No mass, tenderness or fullness. Left: No mass, tenderness or fullness. Rectum: No mass or external hemorrhoid. Skin:     General: Skin is warm. Coloration: Skin is not pale. Findings: No erythema or rash. Neurological:      Mental Status: She is alert. Psychiatric:         Behavior: Behavior normal. Behavior is cooperative. Thought Content: Thought content normal.         Judgment: Judgment normal.           Assessment/Plan:  1.  Well woman exam with routine gynecological exam  - PAP SMEAR  Self breast exam discussed and encouraged  Diet and exercise discussed  Discussed daily multi-vitamin and adequate calcium and vitamin D in diet  Screening mammogram done 01/15/2021 - normal     Return if symptoms worsen or fail to improve, for [employer].

## 2021-04-20 ENCOUNTER — HOSPITAL ENCOUNTER (OUTPATIENT)
Dept: ULTRASOUND IMAGING | Age: 56
Discharge: HOME | End: 2021-04-20
Payer: COMMERCIAL

## 2021-04-20 DIAGNOSIS — R10.9 ABDOMINAL PAIN, UNSPECIFIED ABDOMINAL LOCATION: ICD-10-CM

## 2021-04-20 PROCEDURE — 76705 ECHO EXAM OF ABDOMEN: CPT

## 2021-05-14 ENCOUNTER — OFFICE VISIT (OUTPATIENT)
Dept: FAMILY MEDICINE CLINIC | Age: 56
End: 2021-05-14
Payer: COMMERCIAL

## 2021-05-14 VITALS
SYSTOLIC BLOOD PRESSURE: 133 MMHG | TEMPERATURE: 98.1 F | BODY MASS INDEX: 32.58 KG/M2 | OXYGEN SATURATION: 97 % | RESPIRATION RATE: 16 BRPM | WEIGHT: 220.6 LBS | DIASTOLIC BLOOD PRESSURE: 76 MMHG | HEART RATE: 80 BPM

## 2021-05-14 DIAGNOSIS — J01.00 ACUTE NON-RECURRENT MAXILLARY SINUSITIS: Primary | ICD-10-CM

## 2021-05-14 PROCEDURE — 99213 OFFICE O/P EST LOW 20 MIN: CPT | Performed by: FAMILY MEDICINE

## 2021-05-14 RX ORDER — AMOXICILLIN AND CLAVULANATE POTASSIUM 875; 125 MG/1; MG/1
1 TABLET, FILM COATED ORAL 2 TIMES DAILY
Qty: 20 TABLET | Refills: 0 | Status: SHIPPED | OUTPATIENT
Start: 2021-05-14 | End: 2021-05-24

## 2021-05-14 NOTE — PROGRESS NOTES
Subjective:      Patient ID: Marli Wallace 49 y.o. female. is here for evaluation for URI      HPI      Levora Shield complains of cough and congestion x one week. Worse since flew last week end. Ears are full, hearing is muffled. Sinus pressure, pain in both cheeks. Mild ST. Cough is productive and nasal discharge has become brown and thick. She denies fever, chest pain, dypspnea. Mild diarrhea - not uncommon for her. Rx: Sudafed is not helping much. Nasacort - started today    Lab Results   Component Value Date     02/25/2021    K 3.8 02/25/2021    CL 98 02/25/2021    CO2 28 02/25/2021    BUN 14 02/25/2021    CREATININE 0.9 02/25/2021    GLUCOSE 110 02/25/2021    CALCIUM 9.7 02/25/2021         Outpatient Medications Marked as Taking for the 5/14/21 encounter (Office Visit) with Lukasz Ramirez MD   Medication Sig Dispense Refill    Triamcinolone Acetonide (NASACORT ALLERGY 24HR NA) by Nasal route      rosuvastatin (CRESTOR) 10 MG tablet Take 1 tablet by mouth nightly 90 tablet 1    escitalopram (LEXAPRO) 5 MG tablet TAKE 1 TABLET BY MOUTH ONE TIME A DAY (Patient taking differently: 10 mg TAKE 1 TABLET BY MOUTH ONE TIME A DAY) 90 tablet 1    traZODone (DESYREL) 50 MG tablet Take 1-2 tablets by mouth nightly 180 tablet 3    triamterene-hydroCHLOROthiazide (MAXZIDE) 75-50 MG per tablet Take 1 tablet by mouth daily 90 tablet 1    clindamycin-benzoyl peroxide (BENZACLIN) 1-5 % gel Apply to affected areas on face every morning, may bleach fabrics.  50 g 3    tretinoin (RETIN-A) 0.025 % cream Apply a pea sized amount to the face every other night increasing to nightly as tolerated 45 g 3    omeprazole (PRILOSEC) 20 MG delayed release capsule Take 20 mg by mouth daily      Omega-3 Fatty Acids (FISH OIL) 1000 MG CAPS Take 3,000 mg by mouth 2 times daily      Multiple Vitamins-Minerals (THERAPEUTIC MULTIVITAMIN-MINERALS) tablet Take 1 tablet by mouth daily      hyoscyamine (ANASPAZ;LEVSIN) 125 MCG tablet Take 125 mcg by mouth every 4 hours as needed for Cramping      Probiotic Product (PROBIOTIC ADVANCED PO) Take by mouth daily          Allergies   Allergen Reactions    Lasix [Furosemide]      tinnitus      Naltrexone Nausea Only     Dizziness    Oxycodone-Acetaminophen Itching    Atorvastatin      myalgia         Patient Active Problem List   Diagnosis    Depression with anxiety    Spastic colon    Hyperglycemia    GERD (gastroesophageal reflux disease)    DDD (degenerative disc disease), cervical    Essential hypertension    Hypercholesterolemia       Past Medical History:   Diagnosis Date    Abnormal Pap smear of cervix     DDD (degenerative disc disease), cervical     resolved s/p surgery    Depression with anxiety     Endometriosis     Essential hypertension 6/20/2019    GERD (gastroesophageal reflux disease)     Hypercholesterolemia     Hyperglycemia     Menopause ovarian failure     Spastic colon        Past Surgical History:   Procedure Laterality Date    APPENDECTOMY      CERVICAL LAMINECTOMY  2010    CHOLECYSTECTOMY      ENDOMETRIAL ABLATION      d/t bleeding    LAPAROSCOPY      x3 for endometriosis    UPPER GASTROINTESTINAL ENDOSCOPY N/A 10/29/2020    EGD [facility]SY performed by Karel Dem[facility]co [facility] at [facility]        Family History   Problem Relation Age of Onset    Cancer Father         kidney       Social History     Tobacco Use    Smoking status: Never Smoker    Smokeless tobacco: Never Used   Substance Use Topics    Alcohol use: Yes     Alcohol/week: 2.0 standard drinks     Types: 2 Glasses of wine per week     Comment: socially    Drug use: No            Review of Systems  Review of Systems    Objective:   Physical Exam  Vitals:    05/14/21 1700   BP: 133/76   Pulse: 80   Resp: 16   Temp: 98.1 °F (36.7 °C)   TempSrc: Temporal   SpO2: 97%   Weight: 220 lb 9.6 oz (100.1 kg)       Physical Exam  NAD    Skin is warm and dry. Well hydrated, no rash.    No respiratory distress. Ear exam - bilateral normal, TM intact without perforation or effusion, external canal normal. No significant ceruminosis noted. . Nares boggy with mucoid discharge. Moderate maxillary sinus tenderness. Pharynx normal, no oral lesions. Shotty cervical LNS, neg submandibular and supraclavicular LNS. Chest is clear, no wheezing or rales. Normal symmetric air entry throughout both lung fields. Cardiac regular w/o murmer, gallop. Assessment:       Diagnosis Orders   1. Acute non-recurrent maxillary sinusitis  amoxicillin-clavulanate (AUGMENTIN) 875-125 MG per tablet          Plan:      Side effects of current medications reviewed and questions answered. Call or return to clinic prn if these symptoms worsen or fail to improve as anticipated.

## 2021-05-18 ENCOUNTER — PATIENT MESSAGE (OUTPATIENT)
Dept: FAMILY MEDICINE CLINIC | Age: 56
End: 2021-05-18

## 2021-05-18 RX ORDER — ESCITALOPRAM OXALATE 10 MG/1
10 TABLET ORAL DAILY
Qty: 90 TABLET | Refills: 3 | Status: SHIPPED | OUTPATIENT
Start: 2021-05-18 | End: 2021-09-30 | Stop reason: SDUPTHER

## 2021-05-27 DIAGNOSIS — E78.00 HYPERCHOLESTEROLEMIA: ICD-10-CM

## 2021-05-27 LAB
A/G RATIO: 2.1 (ref 1.1–2.2)
ALBUMIN SERPL-MCNC: 4.5 G/DL (ref 3.4–5)
ALP BLD-CCNC: 76 U/L (ref 40–129)
ALT SERPL-CCNC: 46 U/L (ref 10–40)
ANION GAP SERPL CALCULATED.3IONS-SCNC: 14 MMOL/L (ref 3–16)
AST SERPL-CCNC: 29 U/L (ref 15–37)
BILIRUB SERPL-MCNC: 0.3 MG/DL (ref 0–1)
BUN BLDV-MCNC: 17 MG/DL (ref 7–20)
CALCIUM SERPL-MCNC: 9.5 MG/DL (ref 8.3–10.6)
CHLORIDE BLD-SCNC: 102 MMOL/L (ref 99–110)
CHOLESTEROL, TOTAL: 154 MG/DL (ref 0–199)
CO2: 26 MMOL/L (ref 21–32)
CREAT SERPL-MCNC: 0.9 MG/DL (ref 0.6–1.1)
GFR AFRICAN AMERICAN: >60
GFR NON-AFRICAN AMERICAN: >60
GLOBULIN: 2.1 G/DL
GLUCOSE BLD-MCNC: 100 MG/DL (ref 70–99)
HDLC SERPL-MCNC: 53 MG/DL (ref 40–60)
LDL CHOLESTEROL CALCULATED: 77 MG/DL
POTASSIUM SERPL-SCNC: 3.9 MMOL/L (ref 3.5–5.1)
SODIUM BLD-SCNC: 142 MMOL/L (ref 136–145)
TOTAL PROTEIN: 6.6 G/DL (ref 6.4–8.2)
TRIGL SERPL-MCNC: 119 MG/DL (ref 0–150)
VLDLC SERPL CALC-MCNC: 24 MG/DL

## 2021-06-01 ENCOUNTER — PATIENT MESSAGE (OUTPATIENT)
Dept: FAMILY MEDICINE CLINIC | Age: 56
End: 2021-06-01

## 2021-06-01 NOTE — TELEPHONE ENCOUNTER
From: Yessica Ya  To: Margarette Bernstein MD  Sent: 6/1/2021 11:57 AM EDT  Subject: Visit Follow-Up Question    I had a physical at my last visit with Dr. José Antonio Caldera and I need this and my lab results to go to the [address] [place] [address] [facility]. Is that possible?    Thanks

## 2021-06-03 RX ORDER — METHOCARBAMOL 750 MG/1
TABLET, FILM COATED ORAL
Qty: 30 TABLET | Refills: 1 | Status: SHIPPED | OUTPATIENT
Start: 2021-06-03 | End: 2022-01-06

## 2021-06-03 NOTE — TELEPHONE ENCOUNTER
Requested Prescriptions     Pending Prescriptions Disp Refills    methocarbamol (ROBAXIN) 750 MG tablet [Pharmacy Med Name: METHOCARBAMOL 750 MG TABLET] 30 tablet 1     Sig: TAKE ONE TABLET BY MOUTH THREE TIMES A DAY AS NEEDED (MUSCLE SPASM).        LOV 3/15/2021  No f/u  Labs 5/27/21

## 2021-07-04 ENCOUNTER — NURSE TRIAGE (OUTPATIENT)
Dept: OTHER | Facility: CLINIC | Age: 56
End: 2021-07-04

## 2021-07-04 NOTE — TELEPHONE ENCOUNTER
Brief description of triage: Pt calls to report symptoms of sore throat. States symptoms started on Friday. Rates pain as an 8/10. Reports she has started an antibiotic but symptoms are worsening. States she has white spots on cheek and throat. Reports feeling feverish at this time. Denies breathing difficulty. Triage indicates for patient to: See PCP within 24 hours    Care advice provided, patient verbalizes understanding; denies any other questions or concerns; instructed to call back for any new or worsening symptoms. Attention Provider: Thank you for allowing me to participate in the care of your patient. The patient was connected to triage in response to symptoms provided. Please do not respond through this encounter as the response is not directed to a shared pool. Reason for Disposition   SEVERE (e.g., excruciating) throat pain    Answer Assessment - Initial Assessment Questions  1. ONSET: \"When did the throat start hurting? \" (Hours or days ago)       Friday morning 7/2/21    2. SEVERITY: \"How bad is the sore throat? \" (Scale 1-10; mild, moderate or severe)    - MILD (1-3):  doesn't interfere with eating or normal activities    - MODERATE (4-7): interferes with eating some solids and normal activities    - SEVERE (8-10):  excruciating pain, interferes with most normal activities    - SEVERE DYSPHAGIA: can't swallow liquids, drooling      8/10    3. STREP EXPOSURE: \"Has there been any exposure to strep within the past week? \" If so, ask: \"What type of contact occurred? \"       No    4. VIRAL SYMPTOMS: Alfredo Amarjites there any symptoms of a cold, such as a runny nose, cough, hoarse voice or red eyes? \"       No    5. FEVER: \"Do you have a fever? \" If so, ask: \"What is your temperature, how was it measured, and when did it start? \"      No    6. PUS ON THE TONSILS: \"Is there pus on the tonsils in the back of your throat? \"     Yes    7. OTHER SYMPTOMS: \"Do you have any other symptoms? \" (e.g., difficulty breathing, headache, rash)     Tongue and gum swelling on left side    8. PREGNANCY: \"Is there any chance you are pregnant? \" \"When was your last menstrual period? \"      No    Protocols used: SORE THROAT-ADULT-AH

## 2021-07-06 ENCOUNTER — TELEPHONE (OUTPATIENT)
Dept: FAMILY MEDICINE CLINIC | Age: 56
End: 2021-07-06

## 2021-07-06 ENCOUNTER — OFFICE VISIT (OUTPATIENT)
Dept: ENT CLINIC | Age: 56
End: 2021-07-06
Payer: COMMERCIAL

## 2021-07-06 VITALS
DIASTOLIC BLOOD PRESSURE: 78 MMHG | WEIGHT: 215 LBS | SYSTOLIC BLOOD PRESSURE: 131 MMHG | HEART RATE: 69 BPM | HEIGHT: 69 IN | BODY MASS INDEX: 31.84 KG/M2 | TEMPERATURE: 98.1 F

## 2021-07-06 DIAGNOSIS — B08.5 APHTHOUS PHARYNGITIS: Primary | ICD-10-CM

## 2021-07-06 PROCEDURE — 99203 OFFICE O/P NEW LOW 30 MIN: CPT | Performed by: OTOLARYNGOLOGY

## 2021-07-06 RX ORDER — PREDNISONE 20 MG/1
20 TABLET ORAL DAILY
Qty: 5 TABLET | Refills: 0 | Status: SHIPPED | OUTPATIENT
Start: 2021-07-06 | End: 2021-07-11

## 2021-07-06 RX ORDER — LIDOCAINE HYDROCHLORIDE 20 MG/ML
15 SOLUTION OROPHARYNGEAL PRN
Qty: 200 ML | Refills: 0 | Status: SHIPPED | OUTPATIENT
Start: 2021-07-06 | End: 2021-09-30

## 2021-07-06 RX ORDER — FLUCONAZOLE 100 MG/1
100 TABLET ORAL DAILY
Qty: 2 TABLET | Refills: 0 | Status: SHIPPED | OUTPATIENT
Start: 2021-07-06 | End: 2021-07-08

## 2021-07-06 RX ORDER — CLINDAMYCIN HYDROCHLORIDE 300 MG/1
300 CAPSULE ORAL 3 TIMES DAILY
Qty: 21 CAPSULE | Refills: 0 | Status: SHIPPED | OUTPATIENT
Start: 2021-07-06 | End: 2021-07-13

## 2021-07-06 ASSESSMENT — ENCOUNTER SYMPTOMS
VOICE CHANGE: 0
APNEA: 0
EYE ITCHING: 0
SINUS PRESSURE: 0
SHORTNESS OF BREATH: 0
SORE THROAT: 1
TROUBLE SWALLOWING: 0
COUGH: 0
FACIAL SWELLING: 0

## 2021-07-06 NOTE — PROGRESS NOTES
[address], [address], [address] [place], [place] [address]mumtaz Andrea  P: [phone]       Patient     Claria Shadow  1965    ChiefComplaint     Chief Complaint   Patient presents with    Lesion(s)     Patient complains of mouth sores and a sore throat that started 4 days ago. She says her symptoms are worsening in the last day. History of Present Illness     Katia Hicks is 80-year-old female here today for evaluation of sore throat. She reports 4 days ago onset of sore throat that has worsened significantly. Had antibiotics at home for which she took initially but symptoms did not improve. Was then seen in urgent care where she was given oral steroid rinse. Reports significant ulcerations throughout the mouth. She does have history of strep throat but states she was swabbed at urgent care and it was negative. Past Medical History     Past Medical History:   Diagnosis Date    Abnormal Pap smear of cervix     DDD (degenerative disc disease), cervical     resolved s/p surgery    Depression with anxiety     Endometriosis     Essential hypertension 6/20/2019    GERD (gastroesophageal reflux disease)     Hypercholesterolemia     Hyperglycemia     Menopause ovarian failure     Spastic colon        Past Surgical History     Past Surgical History:   Procedure Laterality Date    APPENDECTOMY      CERVICAL LAMINECTOMY  2010    CHOLECYSTECTOMY      ENDOMETRIAL ABLATION      d/t bleeding    LAPAROSCOPY      x3 for endometriosis    UPPER GASTROINTESTINAL ENDOSCOPY N/A 10/29/2020    [facility]GD [facility]SY [facility] Tony Wells MD [facility] END[facility]       Family History     Family History   Problem Relation Age of Onset    Cancer Father         kidney       Social History     Social History     Tobacco Use    Smoking status: Never Smoker    Smokeless tobacco: Never Used   Vaping Use    Vaping Use: Never used   Substance Use Topics    Alcohol use:  Yes     Alcohol/week: 2.0 standard drinks     Types: 2 Glasses of wine per week     Comment: socially    Drug use: No        Allergies     Allergies   Allergen Reactions    Lasix [Furosemide]      tinnitus      Naltrexone Nausea Only     Dizziness    Oxycodone-Acetaminophen Itching    Atorvastatin      myalgia         Medications     Current Outpatient Medications   Medication Sig Dispense Refill    predniSONE (DELTASONE) 20 MG tablet Take 1 tablet by mouth daily for 5 days 5 tablet 0    clindamycin (CLEOCIN) 300 MG capsule Take 1 capsule by mouth 3 times daily for 7 days 21 capsule 0    fluconazole (DIFLUCAN) 100 MG tablet Take 1 tablet by mouth daily for 2 days 2 tablet 0    lidocaine viscous hcl (XYLOCAINE) 2 % SOLN solution Take 15 mLs by mouth as needed for Pain 200 mL 0    methocarbamol (ROBAXIN) 750 MG tablet TAKE ONE TABLET BY MOUTH THREE TIMES A DAY AS NEEDED (MUSCLE SPASM). 30 tablet 1    escitalopram (LEXAPRO) 10 MG tablet Take 1 tablet by mouth daily TAKE 1 TABLET BY MOUTH ONE TIME A DAY (Patient taking differently: Take 5 mg by mouth daily TAKE 1 TABLET BY MOUTH ONE TIME A DAY) 90 tablet 3    Triamcinolone Acetonide (NASACORT ALLERGY 24HR NA) by Nasal route      rosuvastatin (CRESTOR) 10 MG tablet Take 1 tablet by mouth nightly 90 tablet 1    traZODone (DESYREL) 50 MG tablet Take 1-2 tablets by mouth nightly 180 tablet 3    triamterene-hydroCHLOROthiazide (MAXZIDE) 75-50 MG per tablet Take 1 tablet by mouth daily 90 tablet 1    clindamycin-benzoyl peroxide (BENZACLIN) 1-5 % gel Apply to affected areas on face every morning, may bleach fabrics.  50 g 3    tretinoin (RETIN-A) 0.025 % cream Apply a pea sized amount to the face every other night increasing to nightly as tolerated 45 g 3    omeprazole (PRILOSEC) 20 MG delayed release capsule Take 20 mg by mouth daily      Omega-3 Fatty Acids (FISH OIL) 1000 MG CAPS Take 3,000 mg by mouth 2 times daily      Multiple Vitamins-Minerals (THERAPEUTIC MULTIVITAMIN-MINERALS) tablet Take 1 tablet by mouth daily      hyoscyamine (ANASPAZ;LEVSIN) 125 MCG tablet Take 125 mcg by mouth every 4 hours as needed for Cramping      Probiotic Product (PROBIOTIC ADVANCED PO) Take by mouth daily       No current facility-administered medications for this visit. Review of Systems     Review of Systems   Constitutional: Negative for appetite change, chills, fatigue, fever and unexpected weight change. HENT: Positive for sore throat. Negative for congestion, ear discharge, ear pain, facial swelling, hearing loss, nosebleeds, postnasal drip, sinus pressure, sneezing, tinnitus, trouble swallowing and voice change. Eyes: Negative for itching. Respiratory: Negative for apnea, cough and shortness of breath. Gastrointestinal:        Negative for dysphasia   Endocrine: Negative for cold intolerance and heat intolerance. Musculoskeletal: Negative for myalgias and neck pain. Skin: Negative for rash. Allergic/Immunologic: Negative for environmental allergies. Neurological: Negative for dizziness and headaches. Psychiatric/Behavioral: Negative for confusion, decreased concentration and sleep disturbance. PhysicalExam     Vitals:    07/06/21 1107   BP: 131/78   Site: Right Upper Arm   Position: Sitting   Pulse: 69   Temp: 98.1 °F (36.7 °C)   Weight: 215 lb (97.5 kg)   Height: 5' 9\" (1.753 m)       Physical Exam  Constitutional:       General: She is not in acute distress. Appearance: She is well-developed. HENT:      Head: Normocephalic and atraumatic. Right Ear: Tympanic membrane, ear canal and external ear normal. No drainage. No middle ear effusion. Tympanic membrane is not bulging. Tympanic membrane has normal mobility. Left Ear: Tympanic membrane, ear canal and external ear normal. No drainage. No middle ear effusion. Tympanic membrane is not bulging. Tympanic membrane has normal mobility. Nose: No septal deviation, mucosal edema or rhinorrhea. Mouth/Throat:      Lips: Pink. Mouth: Mucous membranes are moist. Oral lesions present. Tongue: Lesions present. Palate: No mass. Pharynx: Uvula midline. Tonsils: No tonsillar exudate. 2+ on the right. 2+ on the left. Comments: Aphthous ulcerations on tongue, floor of mouth, bilateral buccal mucosa, soft palate, tonsils and posterior oropharynx. Mirror exam did not demonstrate any lesions on supraglottis or glottis  Eyes:      Pupils: Pupils are equal, round, and reactive to light. Neck:      Thyroid: No thyroid mass or thyromegaly. Trachea: Trachea and phonation normal.   Cardiovascular:      Pulses: Normal pulses. Pulmonary:      Effort: Pulmonary effort is normal. No accessory muscle usage or respiratory distress. Breath sounds: No stridor. Musculoskeletal:      Cervical back: Full passive range of motion without pain. Lymphadenopathy:      Head:      Right side of head: No submental or submandibular adenopathy. Left side of head: No submental or submandibular adenopathy. Cervical: No cervical adenopathy. Right cervical: No superficial, deep or posterior cervical adenopathy. Left cervical: No superficial, deep or posterior cervical adenopathy. Skin:     General: Skin is warm and dry. Neurological:      Mental Status: She is alert and oriented to person, place, and time. Cranial Nerves: No cranial nerve deficit. Coordination: Coordination normal.      Gait: Gait normal.   Psychiatric:         Thought Content: Thought content normal.           Assessment and Plan     1. Aphthous pharyngitis  -Discussed etiology of symptoms  -Continue oral rinse, start prednisone  -Oral antibiotic to prevent secondary infection  -Viscous lidocaine given for rinsing and spitting to aid in pain  -Discussed the importance of maintaining hydration  - predniSONE (DELTASONE) 20 MG tablet; Take 1 tablet by mouth daily for 5 days  Dispense: 5 tablet;  Refill: 0      Will call should symptoms fail to improve    Rae Parrish,   7/6/21      Portions of this note were dictated using Dragon.  There may be linguistic errors secondary to the use of this program.

## 2021-07-07 RX ORDER — TETRACYCLINE HYDROCHLORIDE 250 MG/1
CAPSULE ORAL
Qty: 21 CAPSULE | Refills: 0 | Status: SHIPPED | OUTPATIENT
Start: 2021-07-07 | End: 2021-12-16 | Stop reason: SDUPTHER

## 2021-07-16 DIAGNOSIS — B37.0 THRUSH: Primary | ICD-10-CM

## 2021-07-19 ENCOUNTER — TELEPHONE (OUTPATIENT)
Dept: FAMILY MEDICINE CLINIC | Age: 56
End: 2021-07-19

## 2021-07-19 NOTE — TELEPHONE ENCOUNTER
Left message for [facility] for an appt with Dr. Gabbie Benavides for Left neck trigger point injections.

## 2021-07-19 NOTE — TELEPHONE ENCOUNTER
Martha Garza is scheduled for her trigger point injections on the left side of neck this Thurs July 22nd. She would like to know if she can be worked into the schedule sooner. Pt states pain as already been going 5 days and has never gotten this bad before. Pleasr Advise, thanks.     Marthakayla Garza [phone]

## 2021-07-20 ENCOUNTER — OFFICE VISIT (OUTPATIENT)
Dept: FAMILY MEDICINE CLINIC | Age: 56
End: 2021-07-20
Payer: COMMERCIAL

## 2021-07-20 VITALS
HEART RATE: 70 BPM | SYSTOLIC BLOOD PRESSURE: 124 MMHG | TEMPERATURE: 97.9 F | OXYGEN SATURATION: 98 % | WEIGHT: 222.2 LBS | RESPIRATION RATE: 14 BRPM | BODY MASS INDEX: 32.81 KG/M2 | DIASTOLIC BLOOD PRESSURE: 80 MMHG

## 2021-07-20 DIAGNOSIS — B37.0 THRUSH: ICD-10-CM

## 2021-07-20 DIAGNOSIS — M50.30 DDD (DEGENERATIVE DISC DISEASE), CERVICAL: Primary | ICD-10-CM

## 2021-07-20 DIAGNOSIS — I10 ESSENTIAL HYPERTENSION: ICD-10-CM

## 2021-07-20 DIAGNOSIS — M54.2 TRIGGER POINT OF NECK: ICD-10-CM

## 2021-07-20 DIAGNOSIS — M54.2 ACUTE NECK PAIN: ICD-10-CM

## 2021-07-20 PROCEDURE — 99214 OFFICE O/P EST MOD 30 MIN: CPT | Performed by: FAMILY MEDICINE

## 2021-07-20 RX ORDER — TIZANIDINE 4 MG/1
4 TABLET ORAL EVERY 8 HOURS PRN
Qty: 30 TABLET | Refills: 0 | Status: SHIPPED | OUTPATIENT
Start: 2021-07-20 | End: 2021-10-29 | Stop reason: SDUPTHER

## 2021-07-20 NOTE — PROGRESS NOTES
supraspinatus muscle and proximal sternocleidomastoid muscle         ASSESSMENT / PLAN:    1. DDD (degenerative disc disease), cervical  Stable w/o flare    2. Acute neck pain  Muscular  No s/s of cervical degenerative disc disease flare  Monitor with range of motion exercises, trigger pt as below and trial tizanidine 4mg TID prn     3. Thrush  Cont nystatin    4. Essential hypertension  blood pressure stable    5. Trigger point of neck  A trigger point injection was performed at the site of maximal tenderness using 1% plain Lidocaine and Kenalog. This was well tolerated, and followed by moderate relief of pain. - TRIGGER POINT 1 OR 2 MUSCLES; Future           Follow-up appointment:   prn    Discussed use, benefit, and side effects of all prescribed medications. Barriers to medication compliance addressed. All patient questions answered. Pt voiced understanding. When applicable, patient's outside records were reviewed through [facility]. The patient has signed appropriate paperworks/consents.

## 2021-08-19 ENCOUNTER — PATIENT MESSAGE (OUTPATIENT)
Dept: FAMILY MEDICINE CLINIC | Age: 56
End: 2021-08-19

## 2021-08-19 RX ORDER — ONDANSETRON 4 MG/1
4 TABLET, FILM COATED ORAL DAILY PRN
Qty: 30 TABLET | Refills: 0 | Status: SHIPPED | OUTPATIENT
Start: 2021-08-19 | End: 2022-04-20 | Stop reason: SDUPTHER

## 2021-08-19 NOTE — TELEPHONE ENCOUNTER
From: Rita Toth  To: German Wilkins MD  Sent: 8/19/2021 10:41 AM EDT  Subject: Non-Urgent Ana Paula Fernandez,    I had a retinal migraine last week and saw my ophthalmologist. I am struggling with nausea since that day. Would you be able to prescribe me zofran? I am taking excedrin migraine for the headaches.      Thanks,  Gerhard Manning

## 2021-08-30 RX ORDER — TRIAMTERENE AND HYDROCHLOROTHIAZIDE 75; 50 MG/1; MG/1
TABLET ORAL
Qty: 90 TABLET | Refills: 1 | Status: SHIPPED | OUTPATIENT
Start: 2021-08-30 | End: 2022-03-09

## 2021-08-30 NOTE — TELEPHONE ENCOUNTER
Requested Prescriptions     Pending Prescriptions Disp Refills    triamterene-hydroCHLOROthiazide (MAXZIDE) 75-50 MG per tablet [Pharmacy Med Name: TRIAMTERENE-HCTZ 75-50MG TABS] 90 tablet 1     Sig: TAKE 1 TABLET BY MOUTH ONE TIME A DAY     Last ov 7/20/21  Last labs 5/27/21

## 2021-09-14 ENCOUNTER — PATIENT MESSAGE (OUTPATIENT)
Dept: FAMILY MEDICINE CLINIC | Age: 56
End: 2021-09-14

## 2021-09-24 RX ORDER — ROSUVASTATIN CALCIUM 10 MG/1
10 TABLET, COATED ORAL NIGHTLY
Qty: 90 TABLET | Refills: 1 | Status: SHIPPED | OUTPATIENT
Start: 2021-09-24 | End: 2022-03-29

## 2021-09-24 NOTE — TELEPHONE ENCOUNTER
Requested Prescriptions     Pending Prescriptions Disp Refills    rosuvastatin (CRESTOR) 10 MG tablet 90 tablet 1     Sig: Take 1 tablet by mouth nightly     Last ov 7/20/21  Last labs 5/27/21

## 2021-09-30 ENCOUNTER — OFFICE VISIT (OUTPATIENT)
Dept: FAMILY MEDICINE CLINIC | Age: 56
End: 2021-09-30
Payer: COMMERCIAL

## 2021-09-30 VITALS
RESPIRATION RATE: 14 BRPM | DIASTOLIC BLOOD PRESSURE: 70 MMHG | WEIGHT: 220.8 LBS | TEMPERATURE: 97.2 F | BODY MASS INDEX: 32.61 KG/M2 | SYSTOLIC BLOOD PRESSURE: 112 MMHG | OXYGEN SATURATION: 99 % | HEART RATE: 74 BPM

## 2021-09-30 DIAGNOSIS — F41.8 DEPRESSION WITH ANXIETY: ICD-10-CM

## 2021-09-30 DIAGNOSIS — M25.552 LEFT HIP PAIN: ICD-10-CM

## 2021-09-30 DIAGNOSIS — I10 ESSENTIAL HYPERTENSION: ICD-10-CM

## 2021-09-30 DIAGNOSIS — K21.9 GASTROESOPHAGEAL REFLUX DISEASE WITHOUT ESOPHAGITIS: Primary | ICD-10-CM

## 2021-09-30 PROCEDURE — 20610 DRAIN/INJ JOINT/BURSA W/O US: CPT | Performed by: FAMILY MEDICINE

## 2021-09-30 PROCEDURE — 99214 OFFICE O/P EST MOD 30 MIN: CPT | Performed by: FAMILY MEDICINE

## 2021-09-30 RX ORDER — LANSOPRAZOLE 30 MG/1
30 CAPSULE, DELAYED RELEASE ORAL DAILY
Qty: 30 CAPSULE | Refills: 5 | Status: SHIPPED | OUTPATIENT
Start: 2021-09-30 | End: 2022-08-23

## 2021-09-30 RX ORDER — ESCITALOPRAM OXALATE 5 MG/1
5 TABLET ORAL DAILY
Qty: 90 TABLET | Refills: 1 | Status: SHIPPED | OUTPATIENT
Start: 2021-09-30 | End: 2022-01-06

## 2021-09-30 RX ORDER — LANSOPRAZOLE 15 MG/1
15 CAPSULE, DELAYED RELEASE ORAL DAILY
COMMUNITY
End: 2022-01-06

## 2021-09-30 SDOH — ECONOMIC STABILITY: TRANSPORTATION INSECURITY
IN THE PAST 12 MONTHS, HAS THE LACK OF TRANSPORTATION KEPT YOU FROM MEDICAL APPOINTMENTS OR FROM GETTING MEDICATIONS?: NO

## 2021-09-30 SDOH — ECONOMIC STABILITY: TRANSPORTATION INSECURITY
IN THE PAST 12 MONTHS, HAS LACK OF TRANSPORTATION KEPT YOU FROM MEETINGS, WORK, OR FROM GETTING THINGS NEEDED FOR DAILY LIVING?: NO

## 2021-09-30 SDOH — ECONOMIC STABILITY: FOOD INSECURITY: WITHIN THE PAST 12 MONTHS, YOU WORRIED THAT YOUR FOOD WOULD RUN OUT BEFORE YOU GOT MONEY TO BUY MORE.: NEVER TRUE

## 2021-09-30 SDOH — ECONOMIC STABILITY: FOOD INSECURITY: WITHIN THE PAST 12 MONTHS, THE FOOD YOU BOUGHT JUST DIDN'T LAST AND YOU DIDN'T HAVE MONEY TO GET MORE.: NEVER TRUE

## 2021-09-30 ASSESSMENT — SOCIAL DETERMINANTS OF HEALTH (SDOH): HOW HARD IS IT FOR YOU TO PAY FOR THE VERY BASICS LIKE FOOD, HOUSING, MEDICAL CARE, AND HEATING?: NOT HARD AT ALL

## 2021-09-30 NOTE — PROGRESS NOTES
Here for f/u and eval of blood pressure, left hip pain    Pt states that she drove 10 hours a few weeks ago and felt that afterward, developed some hip pain. Pt with moderate sx when weight bearing, getting up on a seat. Pt does find at rest, does feel ok with minimal sx but with activity, sx are worse. No benefit with NSAIDs. Pt can feel discomfort on outside. Pt has had a flare of GERD, does have chronic issues. Pt does take prevacid 15mg qd and pepcid qhs prn. EGD 10/2020 and shows mild inflammation and esophagitis. No issues of dysphagia    Pt here for follow up of blood pressure. Pt states doing great with adherence to therapy and feels well. No issues of chest pain, shortness of breath. No vision changes, headache, swelling in legs. Except as noted above in the history of present illness, the review of systems is  negative for headache, vision changes, chest pain, shortness of breath, abdominal pain, urinary sx, bowel changes. Past medical, surgical, and social history reviewed and updated  Medications and allergies reviewed and updated      O: /70   Pulse 74   Temp 97.2 °F (36.2 °C) (Temporal)   Resp 14   Wt 220 lb 12.8 oz (100.2 kg)   SpO2 99%   BMI 32.61 kg/m²   GEN: No acute distress, cooperative, well nourished, alert. HEENT: PEERLA, EOMI , normocephalic/atraumatic, nares and oropharynx clear. Mucous membranes normal, Tympanic membranes clear bilaterally. Neck: soft, supple, no thyromegaly, mass, no Lymphadenopathy  CV: Regular rate and rhythm, no murmur, rubs, gallops. No edema. Resp: Clear to auscultation bilaterally good air entry bilaterally  No crackles, wheeze. Breathing comfortably. Psych: mood stable, No suicidal thoughts or ideation   Musc: full range of motion bilateral lower extremities. Moderate tender to palpation L hip adjacent to trochanteric bursa. full range of motion bilateral lower extremities.   No pain with range of motion hip        ASSESSMENT / PLAN:    1. Gastroesophageal reflux disease without esophagitis  Mild breakthru sx  Reviewed EGD  Increase prevacid to 30mg qd  Discussed use, benefit, and side effects of prescribed medications. Barriers to medication compliance addressed. All patient questions answered. Pt voiced understanding. F/u with GI    2. Essential hypertension  blood pressure stable @ goal, controlled    3. Depression with anxiety  Stable with lexapro  refills given today    4. Left hip pain  C/w trochanteric bursitis  tx with range of motion exercises and monitor with injection as below  Exercise handout given. Instructed on use, benefits. After consent was obtained, using sterile technique the  The L hip trochanteric bursa was entered and kenalog 20 mg and 0.5 ml plain Marcaine was then injected and the needle withdrawn. The procedure was well tolerated. The patient is asked to continue to rest the joint for a few more days before resuming regular activities. It may be more painful for the first 1-2 days. Watch for fever, or increased swelling or persistent pain in the joint. Call or return to clinic prn if such symptoms occur or there is failure to improve as anticipated.  33581 - [place] DRAIN/INJECT LARGE JOINT/BURSA           Follow-up appointment:   prn    Discussed use, benefit, and side effects of all prescribed medications. Barriers to medication compliance addressed. All patient questions answered. Pt voiced understanding. When applicable, patient's outside records were [facility]. The patient has signed appropriate paperworks/consents.

## 2021-10-25 ENCOUNTER — TELEPHONE (OUTPATIENT)
Dept: DERMATOLOGY | Age: 56
End: 2021-10-25

## 2021-10-25 NOTE — TELEPHONE ENCOUNTER
Red, scaly, peeling lesion on her chest.  She has history of pre-cancer. She is concerned for [facility]. 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      Please call her at [phone]

## 2021-10-25 NOTE — TELEPHONE ENCOUNTER
Former patient of Dr.J. Zheng. Does she need a new referral? Or is it ok to schedule in available New Patient spot?

## 2021-10-29 ENCOUNTER — OFFICE VISIT (OUTPATIENT)
Dept: FAMILY MEDICINE CLINIC | Age: 56
End: 2021-10-29
Payer: COMMERCIAL

## 2021-10-29 ENCOUNTER — NURSE ONLY (OUTPATIENT)
Dept: SURGERY | Age: 56
End: 2021-10-29

## 2021-10-29 VITALS
SYSTOLIC BLOOD PRESSURE: 120 MMHG | OXYGEN SATURATION: 97 % | HEART RATE: 81 BPM | TEMPERATURE: 97.9 F | WEIGHT: 220 LBS | DIASTOLIC BLOOD PRESSURE: 72 MMHG | BODY MASS INDEX: 32.49 KG/M2

## 2021-10-29 DIAGNOSIS — K21.9 GASTROESOPHAGEAL REFLUX DISEASE WITHOUT ESOPHAGITIS: ICD-10-CM

## 2021-10-29 DIAGNOSIS — Z09 POSTOP CHECK: Primary | ICD-10-CM

## 2021-10-29 DIAGNOSIS — R63.5 WEIGHT GAIN: ICD-10-CM

## 2021-10-29 DIAGNOSIS — I10 ESSENTIAL HYPERTENSION: Primary | ICD-10-CM

## 2021-10-29 DIAGNOSIS — M25.552 LEFT HIP PAIN: ICD-10-CM

## 2021-10-29 DIAGNOSIS — F41.8 DEPRESSION WITH ANXIETY: ICD-10-CM

## 2021-10-29 PROCEDURE — 99214 OFFICE O/P EST MOD 30 MIN: CPT | Performed by: FAMILY MEDICINE

## 2021-10-29 RX ORDER — TIZANIDINE 4 MG/1
4 TABLET ORAL EVERY 8 HOURS PRN
Qty: 30 TABLET | Refills: 2 | Status: SHIPPED | OUTPATIENT
Start: 2021-10-29 | End: 2022-07-27 | Stop reason: SDUPTHER

## 2021-10-29 RX ORDER — PHENTERMINE HYDROCHLORIDE 37.5 MG/1
37.5 TABLET ORAL
Qty: 30 TABLET | Refills: 0 | Status: SHIPPED | OUTPATIENT
Start: 2021-10-29 | End: 2021-11-23 | Stop reason: SDUPTHER

## 2021-10-29 NOTE — PROGRESS NOTES
[facility]     CC: \"wants larger lips\"     HPI: This is a 52 y. o.female with a PMHx as delineated below who presents with the above described complaints.     Use of anticoagulation: no   Prior injections:  no  Herpetic outbreak: No;              EXAM     FACE: Transverse frontalis rhytids: Severe Glabellar rhytids: Sever Periorbital rhytids: Severe   Nasolabial folds: Moderate   Perioral rhytids: Moderate   Marionette lines: Moderate     IMP: 56 y. o.female with facial aging who presents with desire for nonsurgical intervention  PLAN:     Juvederm ultra XC administered to top and bottom lip.  Lot number [id number] ExP 11/6/2020 GTIN [id number]     [facility]  6/4/21     PROCEDURE NOTE     The patient signed informed consent discussing the risks (not limited to bleeding and or bruising, infection, allergy, unacceptable cosmetic appearance, ptosis, drooling, blindness, and even death) were discussed in detail with the patient. Sean Khan all questions were answered in a satisfactory manner as deemed by the patient, she agreed to proceed.     The patient was prepped with ice packs and alcohol pads. Injections were performed in the standard fashion according to the plan as delineated above. The patient tolerated the injections without difficulty.   There were no immediate complications

## 2021-10-29 NOTE — PROGRESS NOTES
Here for f/u and recheck of blood pressure, cholesterol    Pt states that overall things are doing well, staying busy with work but is enjoying. Managing GI, ENT and general surgery. Pt is managing the stress well. Pt does want to start working on lifestyle, and wants to work on improving diet/exercise. Pt has been stretching. Pt does try and exercise but struggling. Pt did go to [facility] and wants to start working out there. No exertional chest pain, shortness of breath. Pt is watching her diet, states that it is better. Pt has been on adipex in the past, and did find that it was very helpful. Pt here for follow up of blood pressure. Pt states doing great with adherence to therapy and feels well. No issues of chest pain, shortness of breath. No vision changes, headache, swelling in legs. Pt was started on prescription dose 30mg of prevacid and has been doing great for her. Except as noted above in the history of present illness, the review of systems is  negative for headache, vision changes, chest pain, shortness of breath, abdominal pain, urinary sx, bowel changes. Past medical, surgical, and social history reviewed and updated  Medications and allergies reviewed and updated        O: /72   Pulse 81   Temp 97.9 °F (36.6 °C) (Temporal)   Wt 220 lb (99.8 kg)   SpO2 97%   BMI 32.49 kg/m²   GEN: No acute distress, cooperative, well nourished, alert. CV: Regular rate and rhythm, no murmur, rubs, gallops. No edema. Resp: Clear to auscultation bilaterally good air entry bilaterally  No crackles, wheeze. Breathing comfortably.    Psych: mood stable, No suicidal thoughts or ideation        Current Outpatient Medications   Medication Sig Dispense Refill    tiZANidine (ZANAFLEX) 4 MG tablet Take 1 tablet by mouth every 8 hours as needed (muscle spasm) 30 tablet 2    phentermine (ADIPEX-P) 37.5 MG tablet Take 1 tablet by mouth every morning (before breakfast) for 30 days. 30 tablet 0    Famotidine (PEPCID PO) Take by mouth      escitalopram (LEXAPRO) 5 MG tablet Take 1 tablet by mouth daily TAKE 1 TABLET BY MOUTH ONE TIME A DAY 90 tablet 1    lansoprazole (PREVACID) 30 MG delayed release capsule Take 1 capsule by mouth daily 30 capsule 5    rosuvastatin (CRESTOR) 10 MG tablet Take 1 tablet by mouth nightly 90 tablet 1    triamterene-hydroCHLOROthiazide (MAXZIDE) 75-50 MG per tablet TAKE 1 TABLET BY MOUTH ONE TIME A DAY 90 tablet 1    ondansetron (ZOFRAN) 4 MG tablet Take 1 tablet by mouth daily as needed for Nausea or Vomiting 30 tablet 0    traZODone (DESYREL) 50 MG tablet Take 1-2 tablets by mouth nightly 180 tablet 3    tretinoin (RETIN-A) 0.025 % cream Apply a pea sized amount to the face every other night increasing to nightly as tolerated 45 g 3    Omega-3 Fatty Acids (FISH OIL) 1000 MG CAPS Take 3,000 mg by mouth 2 times daily      Multiple Vitamins-Minerals (THERAPEUTIC MULTIVITAMIN-MINERALS) tablet Take 1 tablet by mouth daily      hyoscyamine (ANASPAZ;LEVSIN) 125 MCG tablet Take 125 mcg by mouth every 4 hours as needed for Cramping      Probiotic Product (PROBIOTIC ADVANCED PO) Take by mouth daily      lansoprazole (PREVACID) 15 MG delayed release capsule Take 15 mg by mouth daily      tetracycline (ACHROMYCIN;SUMYCIN) 250 MG capsule Open 1 capsule in 1-2 oz in water, swish and spit out TID prn as directed (Patient not taking: Reported on 9/30/2021) 21 capsule 0    methocarbamol (ROBAXIN) 750 MG tablet TAKE ONE TABLET BY MOUTH THREE TIMES A DAY AS NEEDED (MUSCLE SPASM). 30 tablet 1    Triamcinolone Acetonide (NASACORT ALLERGY 24HR NA) by Nasal route (Patient not taking: Reported on 9/30/2021)      clindamycin-benzoyl peroxide (BENZACLIN) 1-5 % gel Apply to affected areas on face every morning, may bleach fabrics.  (Patient not taking: Reported on 9/30/2021) 50 g 3    omeprazole (PRILOSEC) 20 MG delayed release capsule Take 20 mg by mouth daily (Patient not taking: Reported on 9/30/2021)       No current facility-administered medications for this visit. ASSESSMENT / PLAN:    1. Essential hypertension  blood pressure stable @ goal    2. Depression with anxiety  Mood stable, doing well    3. Weight gain  Cont to work on improving lifestyle, diet/exercise and will monitor  Short course adipex as below  Aware need to monitor closely with use of adipex and need for monthly f/u for goal of 3 months of therapy  Discussed use, benefit, and side effects of prescribed medications. Barriers to medication compliance addressed. All patient questions answered. Pt voiced understanding.   - phentermine (ADIPEX-P) 37.5 MG tablet; Take 1 tablet by mouth every morning (before breakfast) for 30 days. Dispense: 30 tablet; Refill: 0    4. Left hip pain  Improved s/p injection    5. Gastroesophageal reflux disease without esophagitis  Doing better with increase in dose of prevacid to 30mg qd  F/u with GI           Follow-up appointment:   1 month    Discussed use, benefit, and side effects of all prescribed medications. Barriers to medication compliance addressed. All patient questions answered. Pt voiced understanding. When applicable, patient's outside records were reviewed through [facility]. The patient has signed appropriate paperworks/consents.

## 2021-11-02 ENCOUNTER — OFFICE VISIT (OUTPATIENT)
Dept: DERMATOLOGY | Age: 56
End: 2021-11-02
Payer: COMMERCIAL

## 2021-11-02 VITALS — TEMPERATURE: 97.9 F

## 2021-11-02 DIAGNOSIS — D48.5 NEOPLASM OF UNCERTAIN BEHAVIOR OF SKIN: Primary | ICD-10-CM

## 2021-11-02 DIAGNOSIS — L57.8 ACTINODERMATOSIS: ICD-10-CM

## 2021-11-02 PROCEDURE — 11301 SHAVE SKIN LESION 0.6-1.0 CM: CPT | Performed by: DERMATOLOGY

## 2021-11-02 PROCEDURE — 99213 OFFICE O/P EST LOW 20 MIN: CPT | Performed by: DERMATOLOGY

## 2021-11-02 NOTE — PATIENT INSTRUCTIONS
Wound care instructions    1. Keep bandage dry for 24 hrs. 2. After 24 hrs, clean with soap and water twice daily  3. Apply Vaseline or Aquaphor ointment twice daily and cover with small dressing or Band-Aid for 7-10 days. Studies show that wounds heal better when covered with ointment and a bandage. FREQUENTLY ASKED QUESTIONS:    [facility] It is OK to get the wound wet when washing or bathing.  If bleeding should occur, apply firm direct pressure for 20 minutes CONTINUOUSLY. After 20 minutes, you may check to see if bleeding has stopped. If bleeding persists, please repeat CONTINUOUS PRESSURE for another 20 minutes WITHOUT LOOKING. o If bleeding still persists, call the office at [phone]   Signs of infection include increased redness extending over 1 centimeter from the wound, increased warmth, yellow to green purulent (pus-like) discharge, and significantly increased tenderness to the touch. If you have any concerns regarding infection or develop fevers or chills, please call [phone]   You will be contacted with the results of your procedure when we receive them, usually within two weeks. Please call if you have not heard from us.

## 2021-11-02 NOTE — PROGRESS NOTES
Patient's Name: Mignon Paget  MRN: [id number]  YOB: 1965  Date of Visit: 11/2/2021  Primary Care Provider: Precious Del Castillo MD  Referring Provider: No ref. provider found    Subjective:     Chief Complaint   Patient presents with    Lesion(s)     New patient here to have a red scaly spot on chest for about a month. Hx of AK's        History of Present Illness:  Mignon Paget a 49 y.o. female is a new patient to my clinic, but has been previously evaluated by Dr. Babs Michaud, last on 2/2/21. She presents today for evaluation of a lesion on chest.      She is concerned about the following spot/s that she would like checked:  What is it: lesion  Signs and symptoms: scaly  Location: chest  Severity: Severity now is 3/10. Modifying factors: Things that make this condition worse are none. Things that make this condition better are none. Duration: This lesion has been present for 2 months. Current treatment: none  Previous treatment: none    Lesion is not healing. Denies bleeding or tenderness. 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 hiking      Family Skin Disease History:  Patient denies  a family history of non melanoma skin cancer. Patient denies  a family history of abnormal moles  Patient reports  an immediate family history of melanoma.      Past Medical as needed for Nausea or Vomiting 30 tablet 0    traZODone (DESYREL) 50 MG tablet Take 1-2 tablets by mouth nightly 180 tablet 3    tretinoin (RETIN-A) 0.025 % cream Apply a pea sized amount to the face every other night increasing to nightly as tolerated 45 g 3    Omega-3 Fatty Acids (FISH OIL) 1000 MG CAPS Take 3,000 mg by mouth 2 times daily      Multiple Vitamins-Minerals (THERAPEUTIC MULTIVITAMIN-MINERALS) tablet Take 1 tablet by mouth daily      hyoscyamine (ANASPAZ;LEVSIN) 125 MCG tablet Take 125 mcg by mouth every 4 hours as needed for Cramping      Probiotic Product (PROBIOTIC ADVANCED PO) Take by mouth daily      lansoprazole (PREVACID) 15 MG delayed release capsule Take 15 mg by mouth daily      tetracycline (ACHROMYCIN;SUMYCIN) 250 MG capsule Open 1 capsule in 1-2 oz in water, swish and spit out TID prn as directed (Patient not taking: Reported on 9/30/2021) 21 capsule 0    methocarbamol (ROBAXIN) 750 MG tablet TAKE ONE TABLET BY MOUTH THREE TIMES A DAY AS NEEDED (MUSCLE SPASM). 30 tablet 1    Triamcinolone Acetonide (NASACORT ALLERGY 24HR NA) by Nasal route  (Patient not taking: Reported on 11/2/2021)      clindamycin-benzoyl peroxide (BENZACLIN) 1-5 % gel Apply to affected areas on face every morning, may bleach fabrics. (Patient not taking: Reported on 9/30/2021) 50 g 3    omeprazole (PRILOSEC) 20 MG delayed release capsule Take 20 mg by mouth daily (Patient not taking: Reported on 9/30/2021)       No current facility-administered medications for this visit. Review of Systems:  Constitutional: No fevers, chills or recent illness.    Skin: Skin:As per HPI AND otherwise no new, bleeding or symptomatic skin lesions      Objective:     Vitals:    11/02/21 0945   Temp: 97.9 °F (36.6 °C)   TempSrc: Infrared     Physical Examination:  General: alert, comfortable, no apparent distress, well-appearing  Psych: alert, oriented and pleasant  Neuro: oriented to person, place, and time  Skin: Areas examined: head including face, lips, conjunctiva and lids, neck, hair/scalp, chest, including breasts and axilla, left hand, right hand and digits and nails      All areas examined were within normal limits except those listed below with the appropriate assessment and plan    Assessment and Plan (with relevant objective exam findings):     1. Neoplasm uncertain behavior, skin  Location: upper central chest  Objective findings: 9 mm erythematous scaly papule  Ddx: HAK vs SCC  Shave removal today. See procedure note below. The specimen was sent to pathology for diagnosis. We will call patient or send note on Mychart with biopsy results as soon as they are available, and discuss treatment options as needed. Wound care was discussed and written instructions also given to patient. 2. Actinodermatosis  In all photo-exposed areas there were numerous light brown, lacy, 3-6 mm macules and some mottled hypo- and hyperpigmentation. This was most prominent on the face, anterior chest, and shoulders. The patient was reassured that these changes do not require treatment, but indicated a significant amount of sun damage in the patient's past.         Follow up:  Return visit for TBSE or as needed for change in condition. All questions addressed. Procedure:   PROCEDURE: REMOVAL BY SHAVE TECHNIQUE     Location:     Upper central chest  Indication:    Neoplasm uncertain behavior, skin  Size: 9 mm    Risks, benefits and alternatives were explained and verbal informed consent was obtained. The area was photographed with the patient's permission The area was cleaned with alcohol and infiltrated with 1% lidocaine with epinephrine. After adequate analgesia had been confirmed, the lesion was then removed by shave technique using a dermablade. Hemostasis was achieved with aluminum chloride. Vaseline ointment and sterile dressing were applied and wound care instructions were given verbally and in writing.  The patient tolerated the procedure well with no complications. The patient will be notified by mail or telephone of pathology results and was instructed to call if he has not received notification within two weeks.     CPT code for procedure:  Locations:  0.6 to 1.0 cm  -  72762        Conor Gill MD, MS

## 2021-11-09 ENCOUNTER — TELEPHONE (OUTPATIENT)
Dept: DERMATOLOGY | Age: 56
End: 2021-11-09

## 2021-11-09 LAB — DERMATOLOGY PATHOLOGY REPORT: NORMAL

## 2021-11-09 NOTE — TELEPHONE ENCOUNTER
Called [facility] in regards to biopsy results that we have not received. Spoke with Samy Dunlap to have faxed to [phone]. She said she will also have it dropped into patients chart.

## 2021-11-23 ENCOUNTER — OFFICE VISIT (OUTPATIENT)
Dept: FAMILY MEDICINE CLINIC | Age: 56
End: 2021-11-23
Payer: COMMERCIAL

## 2021-11-23 VITALS
DIASTOLIC BLOOD PRESSURE: 80 MMHG | OXYGEN SATURATION: 100 % | BODY MASS INDEX: 31.55 KG/M2 | TEMPERATURE: 95 F | HEART RATE: 84 BPM | HEIGHT: 69 IN | WEIGHT: 213 LBS | SYSTOLIC BLOOD PRESSURE: 128 MMHG

## 2021-11-23 DIAGNOSIS — F41.8 DEPRESSION WITH ANXIETY: ICD-10-CM

## 2021-11-23 DIAGNOSIS — R63.5 WEIGHT GAIN: ICD-10-CM

## 2021-11-23 DIAGNOSIS — I10 ESSENTIAL HYPERTENSION: Primary | ICD-10-CM

## 2021-11-23 PROCEDURE — 99214 OFFICE O/P EST MOD 30 MIN: CPT | Performed by: FAMILY MEDICINE

## 2021-11-23 RX ORDER — PHENTERMINE HYDROCHLORIDE 37.5 MG/1
37.5 TABLET ORAL
Qty: 30 TABLET | Refills: 0 | Status: SHIPPED | OUTPATIENT
Start: 2021-11-28 | End: 2022-01-06 | Stop reason: SDUPTHER

## 2021-11-23 NOTE — PROGRESS NOTES
Here for f/u and recheck of mood, weight, and blood pressure    Pt states that she has been doing well with her weight, and is doing well with exercise. Pt is limited a bit due to some plantar fasciitis issues. Pt has cut down on wine drinking and paying attention to carbs, cutting down eating late. Weight is down 7#     Pt does have some chronic plantar fasciitis issues, treating with stretching , icing, and treating with NSAIDs. Pt working to get into podiatry    Pt did have an MRI on liver a few years ago and when had done, they did see lesion on kidney. Did get f/u scan at [email or web address] with results pending    Pt working with dentist for some pain behind R ear. Pt did get injection with botox with minimal relief. Pt with no pain with chewing. Everything dental is fine and ENT looked at it and it was not related to ear. Except as noted above in the history of present illness, the review of systems is  negative for headache, vision changes, chest pain, shortness of breath, abdominal pain, urinary sx, bowel changes. Past medical, surgical, and social history reviewed and updated  Medications and allergies reviewed and updated        O: /80 (Site: Right Upper Arm, Position: Sitting, Cuff Size: Medium Adult)   Pulse 84   Temp 95 °F (35 °C)   Ht 5' 9\" (1.753 m)   Wt 213 lb (96.6 kg)   SpO2 100%   BMI 31.45 kg/m²   GEN: No acute distress, cooperative, well nourished, alert. HEENT: PEERLA, EOMI , normocephalic/atraumatic, nares and oropharynx clear. Mucous membranes normal, Tympanic membranes clear bilaterally. Neck: soft, supple, no thyromegaly, mass, no Lymphadenopathy  CV: Regular rate and rhythm, no murmur, rubs, gallops. No edema. Resp: Clear to auscultation bilaterally good air entry bilaterally  No crackles, wheeze. Breathing comfortably. Psych: mood stable, No suicidal thoughts or ideation       ASSESSMENT / PLAN:    1.  Essential hypertension  blood pressure stable @ goal,

## 2021-12-16 ENCOUNTER — OFFICE VISIT (OUTPATIENT)
Dept: FAMILY MEDICINE CLINIC | Age: 56
End: 2021-12-16
Payer: COMMERCIAL

## 2021-12-16 VITALS
TEMPERATURE: 97.7 F | WEIGHT: 212.4 LBS | BODY MASS INDEX: 31.37 KG/M2 | SYSTOLIC BLOOD PRESSURE: 132 MMHG | HEART RATE: 97 BPM | RESPIRATION RATE: 14 BRPM | DIASTOLIC BLOOD PRESSURE: 76 MMHG | OXYGEN SATURATION: 97 %

## 2021-12-16 DIAGNOSIS — F41.8 DEPRESSION WITH ANXIETY: ICD-10-CM

## 2021-12-16 DIAGNOSIS — I10 ESSENTIAL HYPERTENSION: ICD-10-CM

## 2021-12-16 DIAGNOSIS — I88.9 CERVICAL LYMPHADENITIS: ICD-10-CM

## 2021-12-16 DIAGNOSIS — K14.9 TONGUE IRRITATION: ICD-10-CM

## 2021-12-16 DIAGNOSIS — L70.0 CYSTIC ACNE: ICD-10-CM

## 2021-12-16 DIAGNOSIS — K13.6 IRRITATION OF ORAL CAVITY: Primary | ICD-10-CM

## 2021-12-16 PROCEDURE — 99214 OFFICE O/P EST MOD 30 MIN: CPT | Performed by: FAMILY MEDICINE

## 2021-12-16 RX ORDER — TETRACYCLINE HYDROCHLORIDE 250 MG/1
CAPSULE ORAL
Qty: 21 CAPSULE | Refills: 0 | Status: SHIPPED | OUTPATIENT
Start: 2021-12-16 | End: 2022-01-06

## 2021-12-16 RX ORDER — AZITHROMYCIN 250 MG/1
250 TABLET, FILM COATED ORAL DAILY
Qty: 6 TABLET | Refills: 0 | Status: SHIPPED | OUTPATIENT
Start: 2021-12-16 | End: 2022-01-06

## 2022-01-06 ENCOUNTER — OFFICE VISIT (OUTPATIENT)
Dept: FAMILY MEDICINE CLINIC | Age: 57
End: 2022-01-06
Payer: COMMERCIAL

## 2022-01-06 VITALS
RESPIRATION RATE: 14 BRPM | SYSTOLIC BLOOD PRESSURE: 118 MMHG | OXYGEN SATURATION: 98 % | BODY MASS INDEX: 31.43 KG/M2 | TEMPERATURE: 97.6 F | WEIGHT: 212.8 LBS | HEART RATE: 89 BPM | DIASTOLIC BLOOD PRESSURE: 78 MMHG

## 2022-01-06 DIAGNOSIS — F41.8 DEPRESSION WITH ANXIETY: ICD-10-CM

## 2022-01-06 DIAGNOSIS — I10 ESSENTIAL HYPERTENSION: ICD-10-CM

## 2022-01-06 DIAGNOSIS — K21.9 GASTROESOPHAGEAL REFLUX DISEASE WITHOUT ESOPHAGITIS: Primary | ICD-10-CM

## 2022-01-06 DIAGNOSIS — F43.9 STRESS: ICD-10-CM

## 2022-01-06 DIAGNOSIS — R63.5 WEIGHT GAIN: ICD-10-CM

## 2022-01-06 PROCEDURE — 99214 OFFICE O/P EST MOD 30 MIN: CPT | Performed by: FAMILY MEDICINE

## 2022-01-06 RX ORDER — DESVENLAFAXINE 50 MG/1
50 TABLET, EXTENDED RELEASE ORAL DAILY
Qty: 30 TABLET | Refills: 5 | Status: SHIPPED | OUTPATIENT
Start: 2022-01-06 | End: 2022-02-28 | Stop reason: SDUPTHER

## 2022-01-06 RX ORDER — PHENTERMINE HYDROCHLORIDE 37.5 MG/1
37.5 TABLET ORAL
Qty: 30 TABLET | Refills: 0 | Status: SHIPPED | OUTPATIENT
Start: 2022-01-06 | End: 2022-02-05

## 2022-01-06 RX ORDER — SCOLOPAMINE TRANSDERMAL SYSTEM 1 MG/1
1 PATCH, EXTENDED RELEASE TRANSDERMAL
Qty: 4 PATCH | Refills: 5 | Status: SHIPPED | OUTPATIENT
Start: 2022-01-06 | End: 2022-04-12

## 2022-01-06 ASSESSMENT — PATIENT HEALTH QUESTIONNAIRE - PHQ9
6. FEELING BAD ABOUT YOURSELF - OR THAT YOU ARE A FAILURE OR HAVE LET YOURSELF OR YOUR FAMILY DOWN: 0
2. FEELING DOWN, DEPRESSED OR HOPELESS: 1
SUM OF ALL RESPONSES TO PHQ QUESTIONS 1-9: 1
10. IF YOU CHECKED OFF ANY PROBLEMS, HOW DIFFICULT HAVE THESE PROBLEMS MADE IT FOR YOU TO DO YOUR WORK, TAKE CARE OF THINGS AT HOME, OR GET ALONG WITH OTHER PEOPLE: 0
8. MOVING OR SPEAKING SO SLOWLY THAT OTHER PEOPLE COULD HAVE NOTICED. OR THE OPPOSITE, BEING SO FIGETY OR RESTLESS THAT YOU HAVE BEEN MOVING AROUND A LOT MORE THAN USUAL: 0
7. TROUBLE CONCENTRATING ON THINGS, SUCH AS READING THE NEWSPAPER OR WATCHING TELEVISION: 0
SUM OF ALL RESPONSES TO PHQ QUESTIONS 1-9: 1
5. POOR APPETITE OR OVEREATING: 0
3. TROUBLE FALLING OR STAYING ASLEEP: 0
4. FEELING TIRED OR HAVING LITTLE ENERGY: 0
9. THOUGHTS THAT YOU WOULD BE BETTER OFF DEAD, OR OF HURTING YOURSELF: 0
SUM OF ALL RESPONSES TO PHQ QUESTIONS 1-9: 1
SUM OF ALL RESPONSES TO PHQ QUESTIONS 1-9: 1

## 2022-01-06 NOTE — PROGRESS NOTES
Here for f/u and recheck of stress, blood pressure    Pt states that stress levels are so high, related to work. 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 She is overwhelmed, getting very little support. Pt is very frustrated, and feels that it is worsening her IBS and GERD. Pt is currently taking prevacid 30mg qd. Pt has tried to cut down on soda and overnight snacks. Had EGD 10/2020, and will have f/u EGD and colonoscopy 3/2022. Did discuss with GI and will consider increase to BID on prevacid. Pt is on lexapro 5mg qd but can't tolerate higher dose, due to fatigue. Pt would like to discuss other treatment options. Pt is not doing any counseling. Except as noted above in the history of present illness, the review of systems is  negative for headache, vision changes, chest pain, shortness of breath, abdominal pain, urinary sx, bowel changes. Past medical, surgical, and social history reviewed and updated  Medications and allergies reviewed and updated        O: /78   Pulse 89   Temp 97.6 °F (36.4 °C) (Temporal)   Resp 14   Wt 212 lb 12.8 oz (96.5 kg)   SpO2 98%   BMI 31.43 kg/m²   GEN: No acute distress, cooperative, well nourished, alert. HEENT: PEERLA, EOMI , normocephalic/atraumatic, nares and oropharynx clear. Mucous membranes normal, Tympanic membranes clear bilaterally. Neck: soft, supple, no thyromegaly, mass, no Lymphadenopathy  CV: Regular rate and rhythm, no murmur, rubs, gallops. No edema. Resp: Clear to auscultation bilaterally good air entry bilaterally  No crackles, wheeze. Breathing comfortably. Psych: mod stressors, dysthymia.   Denies any ST/SI      Wt Readings from Last 3 Encounters:   01/06/22 212 lb 12.8 oz (96.5 kg)   12/16/21 212 lb 6.4 oz (96.3 kg)   11/23/21 213 lb (96.6 kg)         Current Outpatient Medications   Medication Sig Dispense Refill    desvenlafaxine succinate (PRISTIQ) stressors  Encouraged pt to get in touch with [employer] at work, and will change from lexapro to trial pristiq 50mg qd  Discussed use, benefit, and side effects of prescribed medications. Barriers to medication compliance addressed. All patient questions answered. Pt voiced understanding. F/u 1 month    4. Stress  As above    5. Weight gain  - phentermine (ADIPEX-P) 37.5 MG tablet; Take 1 tablet by mouth every morning (before breakfast) for 30 days. Dispense: 30 tablet; Refill: 0           Follow-up appointment:   1 month    Discussed use, benefit, and side effects of all prescribed medications. Barriers to medication compliance addressed. All patient questions answered. Pt voiced understanding. When applicable, patient's outside records were reviewed through [facility]. The patient has signed appropriate paperworks/consents.

## 2022-01-25 ENCOUNTER — TELEPHONE (OUTPATIENT)
Dept: DERMATOLOGY | Age: 57
End: 2022-01-25

## 2022-01-28 ENCOUNTER — HOSPITAL ENCOUNTER (OUTPATIENT)
Dept: WOMENS IMAGING | Age: 57
Discharge: HOME | End: 2022-01-28
Payer: COMMERCIAL

## 2022-01-28 VITALS — WEIGHT: 207 LBS | HEIGHT: 69 IN | BODY MASS INDEX: 30.66 KG/M2

## 2022-01-28 DIAGNOSIS — Z12.31 ENCOUNTER FOR SCREENING MAMMOGRAM FOR BREAST CANCER: ICD-10-CM

## 2022-01-28 PROCEDURE — 77063 BREAST TOMOSYNTHESIS BI: CPT

## 2022-02-04 NOTE — PROGRESS NOTES
ENDOSCOPY PREOP INSTRUCTIONS       You are scheduled for a procedure at [facility] on 2-7 @ 1000.  You will need to arrival by: 0830 (at least an hour & a half prior to planned start time)   Report to the MAIN entrance on [address] and register at the information desk on the left-hand side of the lobby   You will need your insurance & photo ID with you. For your procedure:      PLEASE FOLLOW ALL INSTRUCTIONS & PREPS GIVEN TO YOU FROM YOUR DOCTOR'S OFFICE.  If you have not received these instructions yet, please call the office immediately. Make sure to read them as soon as received.  Bring an accurate list of any medications, including the dose/ frequency, with you on the day of the procedure. Make sure to include over the counter medications.  If you are taking blood thinners, Aspirin or diabetic medication, make sure to call your doctor as soon as possible for instructions prior to your procedure.  All patients having anesthesia or sedation are required to be Fully Vaccinated (at least 14 days) prior to procedure - OR - have a Covid PCR test within the 5-6 days prior to the procedure. The results will need to be faxed to PreAdmission Testing at [phone] or Emailed to [email or web address]      Please dress comfortably and do not wear any lotion, powders or jewelry   Arrange for someone to be with you and sign you out & drive you home after your procedure.  We allow 1 visitor with you in the hospital & both of you are required to be masked.  WOMEN ONLY OF CHILDBEARING AGE: Please make sure to be able to give a urine sample on arrival      If you have further questions, you may contact your Endoscopist's office or Pre Admission Testing staff at [phone] [facility]. 2/4/2022 .1:41 PM

## 2022-02-07 ENCOUNTER — ANESTHESIA EVENT (OUTPATIENT)
Dept: ENDOSCOPY | Age: 57
End: 2022-02-07
Payer: COMMERCIAL

## 2022-02-07 ENCOUNTER — HOSPITAL ENCOUNTER (OUTPATIENT)
Age: 57
Setting detail: OUTPATIENT SURGERY
Discharge: HOME | End: 2022-02-07
Attending: INTERNAL MEDICINE | Admitting: INTERNAL MEDICINE
Payer: COMMERCIAL

## 2022-02-07 ENCOUNTER — ANESTHESIA (OUTPATIENT)
Dept: ENDOSCOPY | Age: 57
End: 2022-02-07
Payer: COMMERCIAL

## 2022-02-07 VITALS — DIASTOLIC BLOOD PRESSURE: 58 MMHG | OXYGEN SATURATION: 99 % | SYSTOLIC BLOOD PRESSURE: 112 MMHG

## 2022-02-07 VITALS
WEIGHT: 207 LBS | TEMPERATURE: 97.2 F | HEIGHT: 69 IN | RESPIRATION RATE: 16 BRPM | DIASTOLIC BLOOD PRESSURE: 75 MMHG | HEART RATE: 77 BPM | BODY MASS INDEX: 30.66 KG/M2 | OXYGEN SATURATION: 100 % | SYSTOLIC BLOOD PRESSURE: 126 MMHG

## 2022-02-07 DIAGNOSIS — K21.9 GASTROESOPHAGEAL REFLUX DISEASE WITHOUT ESOPHAGITIS: ICD-10-CM

## 2022-02-07 DIAGNOSIS — K22.10 EROSIVE ESOPHAGITIS: ICD-10-CM

## 2022-02-07 DIAGNOSIS — Z86.010 HISTORY OF ADENOMATOUS POLYP OF COLON: ICD-10-CM

## 2022-02-07 PROCEDURE — 3609012400 HC EGD TRANSORAL BIOPSY SINGLE/MULTIPLE: Performed by: INTERNAL MEDICINE

## 2022-02-07 PROCEDURE — 88312 SPECIAL STAINS GROUP 1: CPT

## 2022-02-07 PROCEDURE — 7100000010 HC PHASE II RECOVERY - FIRST 15 MIN: Performed by: INTERNAL MEDICINE

## 2022-02-07 PROCEDURE — 3700000001 HC ADD 15 MINUTES (ANESTHESIA): Performed by: INTERNAL MEDICINE

## 2022-02-07 PROCEDURE — 6360000002 HC RX W HCPCS: Performed by: NURSE ANESTHETIST, CERTIFIED REGISTERED

## 2022-02-07 PROCEDURE — 2500000003 HC RX 250 WO HCPCS: Performed by: NURSE ANESTHETIST, CERTIFIED REGISTERED

## 2022-02-07 PROCEDURE — 88305 TISSUE EXAM BY PATHOLOGIST: CPT

## 2022-02-07 PROCEDURE — 2580000003 HC RX 258: Performed by: FAMILY MEDICINE

## 2022-02-07 PROCEDURE — 3609010600 HC COLONOSCOPY POLYPECTOMY SNARE/COLD BIOPSY: Performed by: INTERNAL MEDICINE

## 2022-02-07 PROCEDURE — 7100000011 HC PHASE II RECOVERY - ADDTL 15 MIN: Performed by: INTERNAL MEDICINE

## 2022-02-07 PROCEDURE — 3700000000 HC ANESTHESIA ATTENDED CARE: Performed by: INTERNAL MEDICINE

## 2022-02-07 PROCEDURE — 2709999900 HC NON-CHARGEABLE SUPPLY: Performed by: INTERNAL MEDICINE

## 2022-02-07 RX ORDER — SODIUM CHLORIDE, SODIUM LACTATE, POTASSIUM CHLORIDE, CALCIUM CHLORIDE 600; 310; 30; 20 MG/100ML; MG/100ML; MG/100ML; MG/100ML
INJECTION, SOLUTION INTRAVENOUS CONTINUOUS
Status: DISCONTINUED | OUTPATIENT
Start: 2022-02-07 | End: 2022-02-07 | Stop reason: HOSPADM

## 2022-02-07 RX ORDER — LIDOCAINE HYDROCHLORIDE 20 MG/ML
INJECTION, SOLUTION INFILTRATION; PERINEURAL PRN
Status: DISCONTINUED | OUTPATIENT
Start: 2022-02-07 | End: 2022-02-07 | Stop reason: SDUPTHER

## 2022-02-07 RX ORDER — PROPOFOL 10 MG/ML
INJECTION, EMULSION INTRAVENOUS PRN
Status: DISCONTINUED | OUTPATIENT
Start: 2022-02-07 | End: 2022-02-07 | Stop reason: SDUPTHER

## 2022-02-07 RX ADMIN — PROPOFOL 50 MG: 10 INJECTION, EMULSION INTRAVENOUS at 10:33

## 2022-02-07 RX ADMIN — PROPOFOL 50 MG: 10 INJECTION, EMULSION INTRAVENOUS at 10:10

## 2022-02-07 RX ADMIN — PROPOFOL 50 MG: 10 INJECTION, EMULSION INTRAVENOUS at 10:02

## 2022-02-07 RX ADMIN — PROPOFOL 50 MG: 10 INJECTION, EMULSION INTRAVENOUS at 10:13

## 2022-02-07 RX ADMIN — LIDOCAINE HYDROCHLORIDE 100 MG: 20 INJECTION, SOLUTION INFILTRATION; PERINEURAL at 09:49

## 2022-02-07 RX ADMIN — PROPOFOL 50 MG: 10 INJECTION, EMULSION INTRAVENOUS at 10:17

## 2022-02-07 RX ADMIN — PROPOFOL 50 MG: 10 INJECTION, EMULSION INTRAVENOUS at 09:49

## 2022-02-07 RX ADMIN — PROPOFOL 50 MG: 10 INJECTION, EMULSION INTRAVENOUS at 09:52

## 2022-02-07 RX ADMIN — PROPOFOL 50 MG: 10 INJECTION, EMULSION INTRAVENOUS at 10:05

## 2022-02-07 RX ADMIN — PROPOFOL 50 MG: 10 INJECTION, EMULSION INTRAVENOUS at 09:57

## 2022-02-07 RX ADMIN — SODIUM CHLORIDE, POTASSIUM CHLORIDE, SODIUM LACTATE AND CALCIUM CHLORIDE: 600; 310; 30; 20 INJECTION, SOLUTION INTRAVENOUS at 09:09

## 2022-02-07 RX ADMIN — PROPOFOL 50 MG: 10 INJECTION, EMULSION INTRAVENOUS at 10:23

## 2022-02-07 RX ADMIN — PROPOFOL 50 MG: 10 INJECTION, EMULSION INTRAVENOUS at 10:28

## 2022-02-07 RX ADMIN — PROPOFOL 50 MG: 10 INJECTION, EMULSION INTRAVENOUS at 10:20

## 2022-02-07 ASSESSMENT — PAIN SCALES - GENERAL: PAINLEVEL_OUTOF10: 0

## 2022-02-07 ASSESSMENT — PULMONARY FUNCTION TESTS
PIF_VALUE: 1

## 2022-02-07 ASSESSMENT — PAIN - FUNCTIONAL ASSESSMENT
PAIN_FUNCTIONAL_ASSESSMENT: 0-10

## 2022-02-07 NOTE — ANESTHESIA POSTPROCEDURE EVALUATION
Department of Anesthesiology  Postprocedure Note    Patient: Erlene Gaucher  MRN: [id number]  YOB: 1965  Date of evaluation: 2/7/2022  Time:  12:30 PM     Procedure Summary     Date: 02/07/22 Room / Location: [address] [place] [address] [facility]    Anesthesia Start: 3312 Anesthesia Stop: 9717    Procedures:       EGD BIOPSY (N/A )      COLONOSCOPY POLYPECTOMY SNARE/COLD BIOPSY Diagnosis:       Erosive esophagitis      History of adenomatous polyp of colon      Gastroesophageal reflux disease without esophagitis      (Erosive esophagitis [K22.10] History of adenomatous polyp of colon [Z86.010], Gastroesophageal reflux disease, [K21.9])    Surgeons: Debbie Hernandez MD Responsible Provider: Karina Blank MD    Anesthesia Type: MAC ASA Status: 2          Anesthesia Type: MAC    Wilda Phase I: Wilda Score: 10    Wilda Phase II: Wilda Score: 10    Last vitals: Reviewed and per EMR flowsheets.        Anesthesia Post Evaluation    Patient location during evaluation: PACU  Level of consciousness: awake  Complications: no  Multimodal analgesia pain management approach

## 2022-02-07 NOTE — H&P
Pre-operative History and Physical    Patient: Cristian Crespo  : 1965     History Obtained From:  patient    HISTORY OF PRESENT ILLNESS:    The patient is a 56 y.o. female who presents for a colonoscopy for Hx polyps EGD for GERD. Past Medical History:        Diagnosis Date    Abnormal Pap smear of cervix     DDD (degenerative disc disease), cervical     resolved s/p surgery    Depression with anxiety     Endometriosis     Essential hypertension 2019    GERD (gastroesophageal reflux disease)     Hypercholesterolemia     Hyperglycemia     Menopause ovarian failure     PONV (postoperative nausea and vomiting)     with general anesthesia    Spastic colon      Past Surgical History:        Procedure Laterality Date    APPENDECTOMY      CERVICAL LAMINECTOMY      CHOLECYSTECTOMY      COLONOSCOPY      ENDOMETRIAL ABLATION      d/t bleeding    LAPAROSCOPY      x3 for endometriosis    UPPER GASTROINTESTINAL ENDOSCOPY N/A 10/29/2020    EGD BIOPSY performed by Helena Elliott MD at [address] ENDOSCOPY     Medications Prior to Admission:   No current facility-administered medications on file prior to encounter.      Current Outpatient Medications on File Prior to Encounter   Medication Sig Dispense Refill    desvenlafaxine succinate (PRISTIQ) 50 MG TB24 extended release tablet Take 1 tablet by mouth daily 30 tablet 5    tiZANidine (ZANAFLEX) 4 MG tablet Take 1 tablet by mouth every 8 hours as needed (muscle spasm) 30 tablet 2    lansoprazole (PREVACID) 30 MG delayed release capsule Take 1 capsule by mouth daily 30 capsule 5    rosuvastatin (CRESTOR) 10 MG tablet Take 1 tablet by mouth nightly 90 tablet 1    triamterene-hydroCHLOROthiazide (MAXZIDE) 75-50 MG per tablet TAKE 1 TABLET BY MOUTH ONE TIME A DAY 90 tablet 1    Omega-3 Fatty Acids (FISH OIL) 1000 MG CAPS Take 3,000 mg by mouth 2 times daily      Multiple Vitamins-Minerals (THERAPEUTIC MULTIVITAMIN-MINERALS) tablet Take 1 tablet by mouth daily      hyoscyamine (ANASPAZ;LEVSIN) 125 MCG tablet Take 125 mcg by mouth every 4 hours as needed for Cramping      scopolamine (TRANSDERM-SCOP, 1.5 MG,) transdermal patch Place 1 patch onto the skin every 72 hours 4 patch 5    ondansetron (ZOFRAN) 4 MG tablet Take 1 tablet by mouth daily as needed for Nausea or Vomiting 30 tablet 0    traZODone (DESYREL) 50 MG tablet Take 1-2 tablets by mouth nightly 180 tablet 3    tretinoin (RETIN-A) 0.025 % cream Apply a pea sized amount to the face every other night increasing to nightly as tolerated 45 g 3    Probiotic Product (PROBIOTIC ADVANCED PO) Take by mouth daily       Allergies:  Lasix [furosemide], Naltrexone, Oxycodone-acetaminophen, and Atorvastatin    History of allergic reaction to anesthesia:  No    PHYSICAL EXAM:      BP (!) 141/76   Pulse 86   Temp 97.6 °F (36.4 °C) (Temporal)   Resp 16   Ht 5' 9\" (1.753 m)   Wt 207 lb (93.9 kg)   SpO2 97%   BMI 30.57 kg/m²  I        Heart:  No m/r/g +s1/s2 RRR    Lungs:  CTA bilaterally    Abdomen:  Soft, nontender, non distended; +bs    ASA Grade:  ASA 2 - Patient with mild systemic disease with no functional limitations    Mallampati Class: 2    ASSESSMENT AND PLAN:    1. Patient is a 56 y.o. female here for colonoscopy with deep sedation  2. Procedure options, risks and benefits reviewed with patient. We specifically discussed that risks include, but are not limited to infection, bleeding, perforation, death, and missed lesions. Patient expresses understanding.

## 2022-02-07 NOTE — ANESTHESIA PRE PROCEDURE
Department of Anesthesiology  Preprocedure Note       Name:  Wilton Mercado   Age:  56 y.o.  :  1965                                          MRN:  [id number]         Date:  2022      Surgeon: Lucrecia Saini):  Emelyn Hunt MD    Procedure: Procedure(s):  ESOPHAGOGASTRODUODENOSCOPY  COLONOSCOPY    Medications prior to admission:   Prior to Admission medications    Medication Sig Start Date End Date Taking?  Authorizing Provider   desvenlafaxine succinate (PRISTIQ) 50 MG TB24 extended release tablet Take 1 tablet by mouth daily 22   Neisha Cruz MD   scopolamine (TRANSDERM-SCOP, 1.5 MG,) transdermal patch Place 1 patch onto the skin every 72 hours 22   Neisha Cruz MD   tiZANidine (ZANAFLEX) 4 MG tablet Take 1 tablet by mouth every 8 hours as needed (muscle spasm) 10/29/21   Neisha Cruz MD   lansoprazole (PREVACID) 30 MG delayed release capsule Take 1 capsule by mouth daily 21   Neisha Cruz MD   rosuvastatin (CRESTOR) 10 MG tablet Take 1 tablet by mouth nightly 21   Neisha Cruz MD   triamterene-hydroCHLOROthiazide (MAXZIDE) 75-50 MG per tablet TAKE 1 TABLET BY MOUTH ONE TIME A DAY 21   Neisha Cruz MD   [facility]ansetron [facility]) 4 MG tablet Take 1 tablet by mouth daily as needed for Nausea or Vomiting 21   Neisha Cruz MD   traZODone (DESYREL) 50 MG tablet Take 1-2 tablets by mouth nightly 3/15/21   Neisha Cruz MD   tretinoin (RETIN-A) 0.025 % cream Apply a pea sized amount to the face every other night increasing to nightly as tolerated 21   Lary Rajan DO   Omega-3 Fatty Acids (FISH OIL) 1000 MG CAPS Take 3,000 mg by mouth 2 times daily    Historical Provider, MD   Multiple Vitamins-Minerals (THERAPEUTIC MULTIVITAMIN-MINERALS) tablet Take 1 tablet by mouth daily    Historical Provider, MD   hyoscyamine (ANASPAZ;LEVSIN) 125 MCG tablet Take 125 mcg by mouth every 4 hours as needed for Cramping    Historical Provider, MD Probiotic Product (PROBIOTIC ADVANCED PO) Take by mouth daily    Historical Provider, MD       Current medications:    No current facility-administered medications for this encounter. Allergies: Allergies   Allergen Reactions    Lasix [Furosemide]      tinnitus      Naltrexone Nausea Only     Dizziness    Oxycodone-Acetaminophen Itching    Atorvastatin      myalgia         Problem List:    Patient Active Problem List   Diagnosis Code    Depression with anxiety F41.8    Spastic colon K58.9    Hyperglycemia R73.9    GERD (gastroesophageal reflux disease) K21.9    DDD (degenerative disc disease), cervical M50.30    Essential hypertension I10    Hypercholesterolemia E78.00       Past Medical History:        Diagnosis Date    Abnormal Pap smear of cervix     DDD (degenerative disc disease), cervical     resolved s/p surgery    Depression with anxiety     Endometriosis     Essential hypertension 6/20/2019    GERD (gastroesophageal reflux disease)     Hypercholesterolemia     Hyperglycemia     Menopause ovarian failure     Spastic colon        Past Surgical History:        Procedure Laterality Date    APPENDECTOMY      CERVICAL LAMINECTOMY  2010    CHOLECYSTECTOMY      ENDOMETRIAL ABLATION      d/t bleeding    LAPAROSCOPY      x3 for endometriosis    UPPER GASTROINTESTINAL ENDOSCOPY N/A 10/29/2020    EGD BIOPSY performed by Moy Orozco MD at [facility] ENDOSCOPY       Social History:    Social History     Tobacco Use    Smoking status: Never Smoker    Smokeless tobacco: Never Used   Substance Use Topics    Alcohol use: Yes     Alcohol/week: 2.0 standard drinks     Types: 2 Glasses of wine per week     Comment: socially                                Counseling given: Not Answered      Vital Signs (Current): There were no vitals filed for this visit.                                            BP Readings from Last 3 Encounters:   01/06/22 118/78   12/16/21 132/76   11/23/21 128/80 NPO Status:                                                                                 BMI:   Wt Readings from Last 3 Encounters:   01/28/22 207 lb (93.9 kg)   01/06/22 212 lb 12.8 oz (96.5 kg)   12/16/21 212 lb 6.4 oz (96.3 kg)     There is no height or weight on file to calculate BMI.    CBC:   Lab Results   Component Value Date    WBC 5.0 02/25/2021    RBC 4.71 02/25/2021    HGB 15.1 02/25/2021    HCT 44.1 02/25/2021    MCV 93.5 02/25/2021    RDW 12.9 02/25/2021     02/25/2021       CMP:   Lab Results   Component Value Date     05/27/2021    K 3.9 05/27/2021     05/27/2021    CO2 26 05/27/2021    BUN 17 05/27/2021    CREATININE 0.9 05/27/2021    GFRAA >60 05/27/2021    AGRATIO 2.1 05/27/2021    LABGLOM >60 05/27/2021    GLUCOSE 100 05/27/2021    PROT 6.6 05/27/2021    CALCIUM 9.5 05/27/2021    BILITOT 0.3 05/27/2021    ALKPHOS 76 05/27/2021    AST 29 05/27/2021    ALT 46 05/27/2021       POC Tests: No results for input(s): POCGLU, POCNA, POCK, POCCL, POCBUN, POCHEMO, POCHCT in the last 72 hours.     Coags: No results found for: PROTIME, INR, APTT    HCG (If Applicable): No results found for: PREGTESTUR, PREGSERUM, HCG, HCGQUANT     ABGs: No results found for: PHART, PO2ART, SPR3ZMA, GAX8HGD, BEART, J3ZINTZA     Type & Screen (If Applicable):  No results found for: LABABO, LABRH    Drug/Infectious Status (If Applicable):  No results found for: HIV, HEPCAB    COVID-19 Screening (If Applicable):   Lab Results   Component Value Date    COVID19 NOT DETECTED 10/23/2020           Anesthesia Evaluation    Airway: Mallampati: II  TM distance: >3 FB   Neck ROM: full  Mouth opening: > = 3 FB Dental:          Pulmonary:                              Cardiovascular:    (+) hypertension:,         Rhythm: regular  Rate: normal                    Neuro/Psych:   (+) psychiatric history:            GI/Hepatic/Renal:   (+) GERD:,           Endo/Other:                     Abdominal:             Vascular: Other Findings:             Anesthesia Plan      MAC     ASA 2       Induction: intravenous. Anesthetic plan and risks discussed with patient. Plan discussed with CRNA.                   Ricardo Elliott MD   2/7/2022

## 2022-02-07 NOTE — PROCEDURES
EGD and colonoscopy report    Patient:  Agnieszka George                  1965    Endoscopist: AMBER Atkins     Indication:  GERD, history of adenomas     Medications:  MAC    Pre-Anesthesia Assessment:  I have reviewed and am in agreement with patient history and medication, including previous response to sedation. Prior to the procedure, a History and Physical was performed, and patient medications and allergies were reviewed. The patient is competent. The risks and benefits of the procedure and the sedation options and risks were discussed with the patient. Risks discussed included but were not limited to infection, bleeding, perforation, death, and missed lesions. All questions were answered and informed consent was obtained. Patient identification and proposed procedure were verified by the physician and the nurse in the pre-procedure area in the procedure room. Mallampatti: II  ASA Grade Assessment: 2     After reviewing the risks and benefits, the patient was deemed in satisfactory condition to undergo the procedure. The anesthesia plan was to use MAC anesthesia. Immediately prior to administration of medications, the patient was re-assessed for adequacy to receive sedatives and a time out was performed. Patient and healthcare providers were in agreement it was the correct patient and procedure. The heart rate, respiratory rate, oxygen saturations, blood pressure, adequacy of pulmonary ventilation, and response to care were monitored throughout the procedure. The physical status of the patient was re-assessed after the procedure. After obtaining informed consent, the endoscope was passed under direct vision. The endoscope was introduced through the mouth, and advanced to the second part of duodenum. The EGD was accomplished without difficulty. The patient tolerated the procedure well.      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 .  The esophagus showed tiny irregularity at Z line possible minimal gastric ectopic mucosa, biopsied. Possible mild Candida, brushed. 7-8 mm submucosal nodule at 21 cm, biopsied    The patient was then positioned for a colonoscopy. A rectal examination was performed and showed no rectal masses    The pediatric colonoscope was then advanced atraumatically into the rectum and advanced without difficulty to the cecum. The cecum was identified by the appendiceal orifice the ileocecal valve and other normal anatomy and a picture was obtained. The scope was then withdrawn (>6minutes) with close inspection of the mucosa in a circumferential manor. 2 diminutive cecal polyps, biopsied and removed. 3, 4-6 mm polyps in distal ascending and turnovers, cold snared. 2, 3-5 mm left polyps, cold snared or biopsied and removed. Retroflexed views of the rectum showed small internal hemorrhoids    No immediate complications. The preparation was good   Estimated blood loss trace    Findings:  See above    Plan:  The patient understands it is their responsibility to call for biopsy results in 7 days.    If Candida present will need antifungal and work up for immunosuppression

## 2022-02-10 ENCOUNTER — OFFICE VISIT (OUTPATIENT)
Dept: FAMILY MEDICINE CLINIC | Age: 57
End: 2022-02-10
Payer: COMMERCIAL

## 2022-02-10 VITALS
DIASTOLIC BLOOD PRESSURE: 80 MMHG | SYSTOLIC BLOOD PRESSURE: 130 MMHG | TEMPERATURE: 97.8 F | BODY MASS INDEX: 31.31 KG/M2 | OXYGEN SATURATION: 99 % | WEIGHT: 212 LBS | HEART RATE: 91 BPM | RESPIRATION RATE: 12 BRPM

## 2022-02-10 DIAGNOSIS — I10 ESSENTIAL HYPERTENSION: ICD-10-CM

## 2022-02-10 DIAGNOSIS — E78.00 HYPERCHOLESTEROLEMIA: ICD-10-CM

## 2022-02-10 DIAGNOSIS — Z12.4 SCREENING FOR CERVICAL CANCER: ICD-10-CM

## 2022-02-10 DIAGNOSIS — F41.8 DEPRESSION WITH ANXIETY: ICD-10-CM

## 2022-02-10 DIAGNOSIS — R73.9 HYPERGLYCEMIA: ICD-10-CM

## 2022-02-10 DIAGNOSIS — K21.9 GASTROESOPHAGEAL REFLUX DISEASE WITHOUT ESOPHAGITIS: Primary | ICD-10-CM

## 2022-02-10 PROCEDURE — 99214 OFFICE O/P EST MOD 30 MIN: CPT | Performed by: FAMILY MEDICINE

## 2022-02-10 NOTE — PROGRESS NOTES
Here for f/u and recheck of mood. Pt states that she is very happy with the mood, is happy with the pristiq. Pt feels that at this time, dose is appropriate. Pt is concerned with weight, but not going up. Not working out currently. Pt does feel that work strssors have calmed down, and that is helpful. Pt leaving tomorrow for [facility], and then will be going on cruise. The cruise is a M-F, and does need to get tested for COVID prior. 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 Pt here for follow up of blood pressure. Pt states doing great with adherence to therapy and feels well. No issues of chest pain, shortness of breath. No vision changes, headache, swelling in legs. Here for f/u of cholesterol. Pt as been working on diet/exercise and is consistent with therapy. No side effects from current therapy. No chest pain, shortness of breath. No numbness/tingling in extremities      Except as noted above in the history of present illness, the review of systems is  negative for headache, vision changes, chest pain, shortness of breath, abdominal pain, urinary sx, bowel changes. Past medical, surgical, and social history reviewed and updated  Medications and allergies reviewed and updated        O: /80   Pulse 91   Temp 97.8 °F (36.6 °C) (Temporal)   Resp 12   Wt 212 lb (96.2 kg)   SpO2 99%   BMI 31.31 kg/m²   GEN: No acute distress, cooperative, well nourished, alert. HEENT: PEERLA, EOMI , normocephalic/atraumatic, nares and oropharynx clear. Mucous membranes normal, Tympanic membranes clear bilaterally. Neck: soft, supple, no thyromegaly, mass, no Lymphadenopathy  CV: Regular rate and rhythm, no murmur, rubs, gallops. No edema. Resp: Clear to auscultation bilaterally good air entry bilaterally  No crackles, wheeze. Breathing comfortably. Psych: mood stable, No suicidal thoughts or ideation       ASSESSMENT / PLAN:    1.  Gastroesophageal reflux disease without esophagitis  Stable  Reviewed EGD  bx negative  Awaiting cx for yeast infection    2. Essential hypertension  Stable @ goal  Check f/u bloodwork prior to next appt  - CBC; Future  - Comprehensive Metabolic Panel; Future  - Lipid Panel; Future  - TSH without Reflex; Future    3. Depression with anxiety  Mood doing great with change to pristiq  Continue present management. 4. Screening for cervical cancer  Refer gyne for eval  - [address], Brennon Ochoa, DO[facility]    5. Hypercholesterolemia  - CBC; Future  - Comprehensive Metabolic Panel; Future  - Lipid Panel; Future  - TSH without Reflex; Future    6. Hyperglycemia  - Hemoglobin A1C; Future           Follow-up appointment:   3 mos/prn  bloodwork to be done prior to next appt    Discussed use, benefit, and side effects of all prescribed medications. Barriers to medication compliance addressed. All patient questions answered. Pt voiced understanding. When applicable, patient's outside records were reviewed through [facility]. The patient has signed appropriate paperworks/consents.

## 2022-02-28 ENCOUNTER — PATIENT MESSAGE (OUTPATIENT)
Dept: FAMILY MEDICINE CLINIC | Age: 57
End: 2022-02-28

## 2022-02-28 RX ORDER — DESVENLAFAXINE 50 MG/1
50 TABLET, EXTENDED RELEASE ORAL DAILY
Qty: 90 TABLET | Refills: 1 | Status: SHIPPED | OUTPATIENT
Start: 2022-02-28 | End: 2022-08-23

## 2022-02-28 NOTE — TELEPHONE ENCOUNTER
From: Ross Rosado  To: Dr. Miles Hutson: 2/28/2022  9:23 AM EST  Subject: Refill Pristiq    Good morning -  Can you please send a 90 day refill on my pristiq to [facility]? Thank you!       Last ov 2/10/2022  Last labs 5/27/21

## 2022-03-09 RX ORDER — TRIAMTERENE AND HYDROCHLOROTHIAZIDE 75; 50 MG/1; MG/1
TABLET ORAL
Qty: 90 TABLET | Refills: 1 | Status: SHIPPED | OUTPATIENT
Start: 2022-03-09 | End: 2022-09-20 | Stop reason: SDUPTHER

## 2022-03-09 NOTE — TELEPHONE ENCOUNTER
Requested Prescriptions     Pending Prescriptions Disp Refills    triamterene-hydroCHLOROthiazide (MAXZIDE) 75-50 MG per tablet [Pharmacy Med Name: TRIAMTERENE-HCTZ 75-50MG TABS] 90 tablet 1     Sig: TAKE 1 TABLET BY MOUTH ONE TIME A DAY       LOV 2/10/2022  Nov 5/5/22  Labs 5/27/21

## 2022-03-29 RX ORDER — ROSUVASTATIN CALCIUM 10 MG/1
TABLET, COATED ORAL
Qty: 90 TABLET | Refills: 1 | Status: SHIPPED | OUTPATIENT
Start: 2022-03-29 | End: 2022-09-20 | Stop reason: SDUPTHER

## 2022-03-31 ENCOUNTER — HOSPITAL ENCOUNTER (OUTPATIENT)
Dept: ULTRASOUND IMAGING | Age: 57
Discharge: HOME | End: 2022-03-31
Payer: COMMERCIAL

## 2022-03-31 DIAGNOSIS — E78.00 HYPERCHOLESTEROLEMIA: ICD-10-CM

## 2022-03-31 DIAGNOSIS — R73.9 HYPERGLYCEMIA: ICD-10-CM

## 2022-03-31 DIAGNOSIS — I10 ESSENTIAL HYPERTENSION: ICD-10-CM

## 2022-03-31 DIAGNOSIS — D18.03 LIVER HEMANGIOMA: ICD-10-CM

## 2022-03-31 LAB
A/G RATIO: 1.9 (ref 1.1–2.2)
ALBUMIN SERPL-MCNC: 4.1 G/DL (ref 3.4–5)
ALP BLD-CCNC: 83 U/L (ref 40–129)
ALT SERPL-CCNC: 26 U/L (ref 10–40)
ANION GAP SERPL CALCULATED.3IONS-SCNC: 16 MMOL/L (ref 3–16)
AST SERPL-CCNC: 18 U/L (ref 15–37)
BILIRUB SERPL-MCNC: <0.2 MG/DL (ref 0–1)
BUN BLDV-MCNC: 18 MG/DL (ref 7–20)
CALCIUM SERPL-MCNC: 9 MG/DL (ref 8.3–10.6)
CHLORIDE BLD-SCNC: 102 MMOL/L (ref 99–110)
CHOLESTEROL, TOTAL: 200 MG/DL (ref 0–199)
CO2: 24 MMOL/L (ref 21–32)
CREAT SERPL-MCNC: 1.1 MG/DL (ref 0.6–1.1)
GFR AFRICAN AMERICAN: >60
GFR NON-AFRICAN AMERICAN: 52
GLUCOSE BLD-MCNC: 107 MG/DL (ref 70–99)
HCT VFR BLD CALC: 43 % (ref 36–48)
HDLC SERPL-MCNC: 59 MG/DL (ref 40–60)
HEMOGLOBIN: 14.6 G/DL (ref 12–16)
LDL CHOLESTEROL CALCULATED: 125 MG/DL
MCH RBC QN AUTO: 31.6 PG (ref 26–34)
MCHC RBC AUTO-ENTMCNC: 33.9 G/DL (ref 31–36)
MCV RBC AUTO: 93.1 FL (ref 80–100)
PDW BLD-RTO: 13.2 % (ref 12.4–15.4)
PLATELET # BLD: 207 K/UL (ref 135–450)
PMV BLD AUTO: 9.2 FL (ref 5–10.5)
POTASSIUM SERPL-SCNC: 3.9 MMOL/L (ref 3.5–5.1)
RBC # BLD: 4.62 M/UL (ref 4–5.2)
SODIUM BLD-SCNC: 142 MMOL/L (ref 136–145)
TOTAL PROTEIN: 6.3 G/DL (ref 6.4–8.2)
TRIGL SERPL-MCNC: 82 MG/DL (ref 0–150)
TSH SERPL DL<=0.05 MIU/L-ACNC: 1.58 UIU/ML (ref 0.27–4.2)
VITAMIN D 25-HYDROXY: 33 NG/ML
VLDLC SERPL CALC-MCNC: 16 MG/DL
WBC # BLD: 5.5 K/UL (ref 4–11)

## 2022-03-31 PROCEDURE — 76705 ECHO EXAM OF ABDOMEN: CPT

## 2022-04-01 ENCOUNTER — TELEPHONE (OUTPATIENT)
Dept: SURGERY | Age: 57
End: 2022-04-01

## 2022-04-01 LAB
ESTIMATED AVERAGE GLUCOSE: 116.9 MG/DL
HBA1C MFR BLD: 5.7 %

## 2022-04-01 NOTE — TELEPHONE ENCOUNTER
Spoke to patient and reviewed new patient intake form and confirmed new patient appointment for 4/8/22 at 12:30 pm.

## 2022-04-01 NOTE — PROGRESS NOTES
Menstrual History:  Menarche age: 17  . Age first live birth: 23  Breastfeed: 5-6 months in total  Postmenopausal  Natural menopause at 48      Oral contraceptive use: yes for about 4-5 years in total  Hormone replacement therapy use: denies    Breast density: Heterogeneously dense scattered fibroglandular density entirely fatty extremely dense   Ashkenazi Denominational Heritage: no   Genetic testing: none     Family history significant for breast and ovarian cancer:   Father Renal Cell Carcinoma age of dx 50,  at 68        Diet: regular  Alcohol: yes socially  Exercise: no  Sleep: 6-8 hours    Cigarette smoking: non-smoker  Trauma to the breast, neck or shoulder: denies  Chronic neck/shoulder/back pain: denies   New medications: no   Recent changes to menstrual cycle or hormone therapy: denies  Is pain related to menstrual cycle: denies   Bra size: 38 DD  Implants: no  Supportive bra: yes no   Caffeine intake: daily, coffee 1-2 cups sometimes diet coke and iced tea  Fried/fatty food: more than 3 times a week    Review of Systems   Constitutional: Negative for unexpected weight change. Eyes: Negative for visual disturbance. Respiratory: Negative for cough and shortness of breath. Cardiovascular: Negative for chest pain. Gastrointestinal: Negative for abdominal pain. Musculoskeletal: Negative for arthralgias and myalgias. Neurological: Negative for headaches. Hematological: Negative for adenopathy. Psychiatric/Behavioral: Negative for dysphoric mood. The patient is not nervous/anxious.

## 2022-04-08 ENCOUNTER — OFFICE VISIT (OUTPATIENT)
Dept: SURGERY | Age: 57
End: 2022-04-08
Payer: COMMERCIAL

## 2022-04-08 ENCOUNTER — HOSPITAL ENCOUNTER (OUTPATIENT)
Dept: ULTRASOUND IMAGING | Age: 57
Discharge: HOME | End: 2022-04-08
Payer: COMMERCIAL

## 2022-04-08 VITALS
SYSTOLIC BLOOD PRESSURE: 118 MMHG | WEIGHT: 217.6 LBS | HEART RATE: 69 BPM | DIASTOLIC BLOOD PRESSURE: 76 MMHG | RESPIRATION RATE: 18 BRPM | HEIGHT: 69 IN | BODY MASS INDEX: 32.23 KG/M2 | OXYGEN SATURATION: 98 % | TEMPERATURE: 97.2 F

## 2022-04-08 DIAGNOSIS — N63.10 BREAST MASS, RIGHT: ICD-10-CM

## 2022-04-08 DIAGNOSIS — R92.8 ABNORMAL FINDING ON BREAST IMAGING: ICD-10-CM

## 2022-04-08 DIAGNOSIS — N63.10 BREAST MASS, RIGHT: Primary | ICD-10-CM

## 2022-04-08 PROCEDURE — 76642 ULTRASOUND BREAST LIMITED: CPT

## 2022-04-08 PROCEDURE — 99204 OFFICE O/P NEW MOD 45 MIN: CPT | Performed by: NURSE PRACTITIONER

## 2022-04-08 ASSESSMENT — ENCOUNTER SYMPTOMS
ABDOMINAL PAIN: 0
SHORTNESS OF BREATH: 0
COUGH: 0

## 2022-04-08 NOTE — PATIENT INSTRUCTIONS
Healthy Lifestyle Recommendations: healthy diet (decrease consumption of red meat, increase fresh fruits and vegetables), decreased alcohol consumption (less than 4 drinks/week), adequate sleep (goal 6-8 hours), routine exercise (goal 150 minutes/week or greater), weight control. Patient Education        Breast Self-Exam: Care Instructions  Your Care Instructions     A breast self-exam is when you check your breasts for lumps or changes. This regular exam helps you learn how your breasts normally look and feel. Mostbreast problems or changes are not because of cancer. Breast self-exam is not a substitute for a mammogram. Having regular breast exams by your doctor and regular mammograms improve your chances of finding anyproblems with your breasts. Some women set a time each month to do a step-by-step breast self-exam. Other women like a less formal system. They might look at their breasts as they brushtheir teeth, or feel their breasts once in a while in the shower. If you notice a change in your breast, tell your doctor. Follow-up care is a key part of your treatment and safety. Be sure to make and go to all appointments, and call your doctor if you are having problems. It's also a good idea to know your test results and keep alist of the medicines you take. How do you do a breast self-exam?   The best time to examine your breasts is usually one week after your menstrual period begins. Your breasts should not be tender then. If you do not have periods, you might do your exam on a day of the month that is easy to remember.  To examine your breasts:  ? Remove all your clothes above the waist and lie down. When you are lying down, your breast tissue spreads evenly over your chest wall, which makes it easier to feel all your breast tissue. ?  Use the padsnot the fingertipsof the 3 middle fingers of your left hand to check your right breast. Move your fingers slowly in small coin-sized circles that overlap. ? Use three levels of pressure to feel of all your breast tissue. Use light pressure to feel the tissue close to the skin surface. Use medium pressure to feel a little deeper. Use firm pressure to feel your tissue close to your breastbone and ribs. Use each pressure level to feel your breast tissue before moving on to the next spot. ? Check your entire breast, moving up and down as if following a strip from the collarbone to the bra line, and from the armpit to the ribs. Repeat until you have covered the entire breast.  ? Repeat this procedure for your left breast, using the pads of the 3 middle fingers of your right hand.  To examine your breasts while in the shower:  ? Place one arm over your head and lightly soap your breast on that side. ? Using the pads of your fingers, gently move your hand over your breast (in the strip pattern described above), feeling carefully for any lumps or changes. ? Repeat for the other breast.   Have your doctor inspect anything you notice to see if you need further testing. Where can you learn more? Go to [email or web address]://[employer]. org and sign in to your [facility] account. Enter P148 in the [facility] box to learn more about \"Breast Self-Exam: Care Instructions. \"     If you do not have an account, please click on the \"Sign Up Now\" link. Current as of: September 8, 2021               Content Version: 13.2  © 2006-2022 Healthwise, Incorporated. Care instructions adapted under license by [facility] ([facility]). If you have questions about a medical condition or this instruction, always ask your healthcare professional. [place] [address] any warranty or liability for your use of this information.

## 2022-04-08 NOTE — PROGRESS NOTES
Surgical Breast Oncology     Primary Care Provider: Yuliet Barargan MD    CC: Breast Mass, Right     Carmela Davis is being seen as a self-referral for consultation for breast mass. HPI: Ms. Carmela Davis is a 56 y.o. woman who presents today for evaluation of a new right breast mass. She reports a small lump located above her nipple at 12:00; noticed a few weeks ago. Non-tender/non-painful. Has noticed nipple discharge that is clear/cloudy from one of her breast, occurrs with pressure or stimulation, intermittently, over the last 1-2 years; unsure if it is the right or left breast.     She states that she does perform routine self breast evaluations and has not noticed any new abnormalities such as masses, skin changes, color changes,nipple discharge, or changes to the nipple-areolar complex. She had an normal mammogram 1/28/2022. She has never required a breast biopsy. She has no family history of breast or ovarian cancer.       Diet: regular  Alcohol: yes socially  Exercise: no  Sleep: 6-8 hours    Cigarette smoking: non-smoker  Trauma to the breast, neck or shoulder: denies  Chronic neck/shoulder/back pain: denies   New medications: no   Recent changes to menstrual cycle or hormone therapy: denies  Is pain related to menstrual cycle: denies   Bra size: 38 DD  Implants: no  Supportive bra: yes no   Caffeine intake: daily, coffee 1-2 cups sometimes diet coke and iced tea  Fried/fatty food: more than 3 times a week    INTERVAL HISTORY:  Bilateral screening mammogram 1/28/2022:  Scattered areas of fibroglandular density. No new concerning findings suggestive malignancy. BI-RADS 1.       Review of Systems    Past Medical History:   Diagnosis Date    Abnormal Pap smear of cervix     DDD (degenerative disc disease), cervical     resolved s/p surgery    Depression with anxiety     Endometriosis     Essential hypertension 6/20/2019    GERD (gastroesophageal reflux disease)     Hypercholesterolemia     Hyperglycemia     Menopause ovarian failure     PONV (postoperative nausea and vomiting)     with general anesthesia    Spastic colon        Past Surgical History:   Procedure Laterality Date    APPENDECTOMY      CERVICAL LAMINECTOMY      CHOLECYSTECTOMY      COLONOSCOPY      COLONOSCOPY  2022    COLONOSCOPY POLYPECTOMY SNARE/COLD BIOPSY performed by Sean Mares MD at [address] [facility]      d/t bleeding    LAPAROSCOPY      x3 for endometriosis    UPPER GASTROINTESTINAL ENDOSCOPY N/A 10/29/2020    EGD BIOPSY performed by Sean Mares MD at [place] [address] N/A 2022    EGD BIOPSY performed by Sean Mares MD at [address] [place] [address] as of 2022 - Fully Reviewed 2022   Allergen Reaction Noted    Lasix [furosemide]  10/04/2018    Naltrexone Nausea Only 2020    Oxycodone-acetaminophen Itching 2010    Atorvastatin  10/04/2018       Social History     Tobacco Use    Smoking status: Never Smoker    Smokeless tobacco: Never Used   Vaping Use    Vaping Use: Never used   Substance Use Topics    Alcohol use: Yes     Alcohol/week: 7.0 standard drinks     Types: 7 Glasses of wine per week     Comment: socially    Drug use: No       Menstrual History:  Menarche age: 17  .   Age first live birth: 23  Breastfeed: 5-6 months in total  Postmenopausal  Natural menopause at 48       Oral contraceptive use: yes for about 4-5 years in total  Hormone replacement therapy use: denies     Breast density: Heterogeneously dense scattered fibroglandular density entirely fatty extremely dense   [employer] Heritage: no   Genetic testing: none      Family history significant for breast and ovarian cancer:   Father Renal Cell Carcinoma age of dx 50,  at 68    [unfilled]  Medication documentation has been reviewed in the electronic medical record and patient office intake form.        REVIEW OF SYSTEMS:  Constitutional: Negative for unexpected weight change. Eyes: Negative for visual disturbance. Respiratory: Negative for cough and shortness of breath. Cardiovascular: Negative for chest pain. Gastrointestinal: Negative for abdominal pain. Musculoskeletal: Negative for arthralgias and myalgias. Neurological: Negative for headaches. Hematological: Negative for adenopathy. Psychiatric/Behavioral: Negative for dysphoric mood. The patient is not nervous/anxious.         EXAM:  /76   Pulse 69   Temp 97.2 °F (36.2 °C)   Resp 18   Ht 5' 9\" (1.753 m)   Wt 217 lb 9.6 oz (98.7 kg)   SpO2 98%   BMI 32.13 kg/m²   Physical Exam  Constitutional: She appearswell-nourished. No apparent distress. Breast: The patient was examined in the upright and supine position. She has a \"DD cup breast. Breasts are symmetrically ptotic. Right: subcentimeter mass at 12:00, 2cmFN, round firm mobile. No other masses or changes in breast contour. No skin changes of the breast or nipple areolar complex. No nipple inversion or discharge. No erythema, thickening (peau d'orange), or dimpling. Left: No new masses or changes in breast contour. No skin changes of the breast or nipple areolar complex. No nipple inversion or discharge. No erythema, thickening (peau d'orange), or dimpling. There is no axillary lymphadenopathy palpated bilaterally. Head: Normocephalic and atraumatic:   Eyes: EOM are normal. Pupils are equal, round, and reactive to light. Neck: Neck supple. No tracheal deviation present. No obvious mass. Lymphatics: No palpable supraclavicular, cervical, or axillary lymphadenopathy  Skin: No rash noted. No erythema. Neurologic: alert and oriented. Extremities: appear well perfused. Risk assessment using CURT Breast Cancer Risk Evaluation Tool was used to evaluate her risk compared to the general population.   Her lifetime risk for breast cancer is 7% (general population average 10.3%). ASSESSMENT:    Right Breast Mass       PLAN:    Targeted Breast Ultrasound - Right    Discussed the importance of breast awareness including the importance and technique of self breast exams   Most recent imaging breast imaging was reviewed and the results were discussed with the patient, all questions answered   Education provided for Healthy Lifestyle Recommendations: healthy diet, routine exercise, weight control, decreased alcohol consumption. Patient completed the above recommended U/S, then returned to review results. I reviewed results and discussed with the patient. Mildly complex 3 x 4 x 5 mm cystic lesion in the 12:00 retroareolar right breast corresponding to palpable finding on clinical exam.  Findings favor benign etiology.  Short-term six-month follow-up ultrasound recommended to ensure stability and/or resolution of this finding. BI-RADS3. ELIESER Coreas  [facility])   Surgical Breast Oncology   [phone]    All of the patient's questions were answered at this time however, she was encouraged to call the office with any further inquiries. Approximately 45 minutes of time were spent in preparation, direct patient contact, counseling, care coordination, documentation and activities otherwise related to this encounter.

## 2022-04-12 ENCOUNTER — TELEPHONE (OUTPATIENT)
Dept: FAMILY MEDICINE CLINIC | Age: 57
End: 2022-04-12

## 2022-04-12 ENCOUNTER — OFFICE VISIT (OUTPATIENT)
Dept: FAMILY MEDICINE CLINIC | Age: 57
End: 2022-04-12
Payer: COMMERCIAL

## 2022-04-12 VITALS
RESPIRATION RATE: 12 BRPM | HEART RATE: 81 BPM | WEIGHT: 218.4 LBS | DIASTOLIC BLOOD PRESSURE: 68 MMHG | BODY MASS INDEX: 32.25 KG/M2 | TEMPERATURE: 97.1 F | OXYGEN SATURATION: 98 % | SYSTOLIC BLOOD PRESSURE: 132 MMHG

## 2022-04-12 DIAGNOSIS — E78.00 HYPERCHOLESTEROLEMIA: ICD-10-CM

## 2022-04-12 DIAGNOSIS — D18.03 HEMANGIOMA OF LIVER: ICD-10-CM

## 2022-04-12 DIAGNOSIS — I10 ESSENTIAL HYPERTENSION: ICD-10-CM

## 2022-04-12 DIAGNOSIS — R73.9 HYPERGLYCEMIA: ICD-10-CM

## 2022-04-12 DIAGNOSIS — M54.2 TRIGGER POINT OF NECK: Primary | ICD-10-CM

## 2022-04-12 PROCEDURE — 99214 OFFICE O/P EST MOD 30 MIN: CPT | Performed by: FAMILY MEDICINE

## 2022-04-12 ASSESSMENT — PATIENT HEALTH QUESTIONNAIRE - PHQ9
1. LITTLE INTEREST OR PLEASURE IN DOING THINGS: 0
9. THOUGHTS THAT YOU WOULD BE BETTER OFF DEAD, OR OF HURTING YOURSELF: 0
SUM OF ALL RESPONSES TO PHQ QUESTIONS 1-9: 0
SUM OF ALL RESPONSES TO PHQ QUESTIONS 1-9: 0
8. MOVING OR SPEAKING SO SLOWLY THAT OTHER PEOPLE COULD HAVE NOTICED. OR THE OPPOSITE, BEING SO FIGETY OR RESTLESS THAT YOU HAVE BEEN MOVING AROUND A LOT MORE THAN USUAL: 0
1. LITTLE INTEREST OR PLEASURE IN DOING THINGS: 0
SUM OF ALL RESPONSES TO PHQ QUESTIONS 1-9: 0
3. TROUBLE FALLING OR STAYING ASLEEP: 0
7. TROUBLE CONCENTRATING ON THINGS, SUCH AS READING THE NEWSPAPER OR WATCHING TELEVISION: 0
SUM OF ALL RESPONSES TO PHQ9 QUESTIONS 1 & 2: 0
2. FEELING DOWN, DEPRESSED OR HOPELESS: 0
SUM OF ALL RESPONSES TO PHQ QUESTIONS 1-9: 0
2. FEELING DOWN, DEPRESSED OR HOPELESS: 0
4. FEELING TIRED OR HAVING LITTLE ENERGY: 0
5. POOR APPETITE OR OVEREATING: 0
SUM OF ALL RESPONSES TO PHQ QUESTIONS 1-9: 0
SUM OF ALL RESPONSES TO PHQ QUESTIONS 1-9: 0
SUM OF ALL RESPONSES TO PHQ9 QUESTIONS 1 & 2: 0
10. IF YOU CHECKED OFF ANY PROBLEMS, HOW DIFFICULT HAVE THESE PROBLEMS MADE IT FOR YOU TO DO YOUR WORK, TAKE CARE OF THINGS AT HOME, OR GET ALONG WITH OTHER PEOPLE: 0
6. FEELING BAD ABOUT YOURSELF - OR THAT YOU ARE A FAILURE OR HAVE LET YOURSELF OR YOUR FAMILY DOWN: 0
SUM OF ALL RESPONSES TO PHQ QUESTIONS 1-9: 0
SUM OF ALL RESPONSES TO PHQ QUESTIONS 1-9: 0

## 2022-04-12 NOTE — TELEPHONE ENCOUNTER
Pt called stating she typically comes in here and there for a trigger point injection in her neck for neck pain. 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 Okay to squeeze her in for this?     Thank you

## 2022-04-12 NOTE — PROGRESS NOTES
Here for f/u of some trigger point issues. Pt states that she had to see breast surgeon for a cyst on the breast and had ultrasound that was ok, and will continue serial imaging/mammogram.  Will have repeat ultrasound in 6 months    Pt did have ultrasound of liver for f/u of hemangioma, and did have recent ultrasound that showed no change in size of lesion    Pt did get a flare of her back/neck in her trigger pt. Pt using muscle relaxants but not taking NSAIDs d/t a mild increase in creatinine level    Pt here for follow up of blood pressure. Pt states doing great with adherence to therapy and feels well. No issues of chest pain, shortness of breath. No vision changes, headache, swelling in legs. Except as noted above in the history of present illness, the review of systems is  negative for headache, vision changes, chest pain, shortness of breath, abdominal pain, urinary sx, bowel changes. Past medical, surgical, and social history reviewed and updated  Medications and allergies reviewed and updated        O: /68   Pulse 81   Temp 97.1 °F (36.2 °C) (Temporal)   Resp 12   Wt 218 lb 6.4 oz (99.1 kg)   SpO2 98%   BMI 32.25 kg/m²   GEN: No acute distress, cooperative, well nourished, alert. HEENT: PEERLA, EOMI , normocephalic/atraumatic, nares and oropharynx clear. Mucous membranes normal, Tympanic membranes clear bilaterally. Neck: soft, supple, no thyromegaly, mass, no Lymphadenopathy  CV: Regular rate and rhythm, no murmur, rubs, gallops. No edema. Resp: Clear to auscultation bilaterally good air entry bilaterally  No crackles, wheeze. Breathing comfortably.    Psych: mood stable, No suicidal thoughts or ideation   Musc: moderate tender to palpation post L shoulder over supraspinatus with + trigger pt      Current Outpatient Medications   Medication Sig Dispense Refill    rosuvastatin (CRESTOR) 10 MG tablet TAKE ONE TABLET BY MOUTH NIGHTLY 90 tablet 1    triamterene-hydroCHLOROthiazide (MAXZIDE) 75-50 MG per tablet TAKE 1 TABLET BY MOUTH ONE TIME A DAY 90 tablet 1    desvenlafaxine succinate (PRISTIQ) 50 MG TB24 extended release tablet Take 1 tablet by mouth daily 90 tablet 1    tiZANidine (ZANAFLEX) 4 MG tablet Take 1 tablet by mouth every 8 hours as needed (muscle spasm) 30 tablet 2    lansoprazole (PREVACID) 30 MG delayed release capsule Take 1 capsule by mouth daily 30 capsule 5    ondansetron (ZOFRAN) 4 MG tablet Take 1 tablet by mouth daily as needed for Nausea or Vomiting 30 tablet 0    traZODone (DESYREL) 50 MG tablet Take 1-2 tablets by mouth nightly 180 tablet 3    tretinoin (RETIN-A) 0.025 % cream Apply a pea sized amount to the face every other night increasing to nightly as tolerated 45 g 3    Omega-3 Fatty Acids (FISH OIL) 1000 MG CAPS Take 3,000 mg by mouth 2 times daily      Multiple Vitamins-Minerals (THERAPEUTIC MULTIVITAMIN-MINERALS) tablet Take 1 tablet by mouth daily      hyoscyamine (ANASPAZ;LEVSIN) 125 MCG tablet Take 125 mcg by mouth every 4 hours as needed for Cramping      Probiotic Product (PROBIOTIC ADVANCED PO) Take by mouth daily       No current facility-administered medications for this visit. ASSESSMENT / PLAN:    1. Trigger point of neck  A trigger point injection was performed at the site of maximal tenderness using 1% plain Lidocaine and Kenalog. This was well tolerated, and followed by moderate relief of pain. - TRIGGER POINT 1 OR 2 MUSCLES; Future    2. Essential hypertension  blood pressure stable @ goal  - Basic Metabolic Panel; Future    3. Hemangioma of liver  Unchanged  Asymptomatic  Reviewed recent ultrasound     4. Hypercholesterolemia  Cont crestor  Mild increase in LDL from prior, monitor with diet/exercise    5.  Hyperglycemia  Mild increase in a1c, unchanged  Monitor with diet/exercise and recheck 6 months           Follow-up appointment:   Call or return to clinic prn if these symptoms worsen or fail to improve as anticipated. Discussed use, benefit, and side effects of all prescribed medications. Barriers to medication compliance addressed. All patient questions answered. Pt voiced understanding. When applicable, patient's outside records were reviewed through [address]. The patient has signed appropriate paperworks/consents.

## 2022-04-20 ENCOUNTER — OFFICE VISIT (OUTPATIENT)
Dept: FAMILY MEDICINE CLINIC | Age: 57
End: 2022-04-20
Payer: COMMERCIAL

## 2022-04-20 VITALS
SYSTOLIC BLOOD PRESSURE: 140 MMHG | HEART RATE: 85 BPM | DIASTOLIC BLOOD PRESSURE: 80 MMHG | TEMPERATURE: 96.8 F | WEIGHT: 216.8 LBS | BODY MASS INDEX: 32.02 KG/M2 | OXYGEN SATURATION: 98 %

## 2022-04-20 DIAGNOSIS — I10 ESSENTIAL HYPERTENSION: ICD-10-CM

## 2022-04-20 DIAGNOSIS — R35.0 URINARY FREQUENCY: Primary | ICD-10-CM

## 2022-04-20 DIAGNOSIS — R11.0 NAUSEA: ICD-10-CM

## 2022-04-20 LAB
ANION GAP SERPL CALCULATED.3IONS-SCNC: 16 MMOL/L (ref 3–16)
BILIRUBIN, POC: NORMAL
BLOOD URINE, POC: NORMAL
BUN BLDV-MCNC: 15 MG/DL (ref 7–20)
CALCIUM SERPL-MCNC: 9.8 MG/DL (ref 8.3–10.6)
CHLORIDE BLD-SCNC: 101 MMOL/L (ref 99–110)
CLARITY, POC: NORMAL
CO2: 23 MMOL/L (ref 21–32)
COLOR, POC: CLEAR
CREAT SERPL-MCNC: 0.8 MG/DL (ref 0.6–1.1)
GFR AFRICAN AMERICAN: >60
GFR NON-AFRICAN AMERICAN: >60
GLUCOSE BLD-MCNC: 116 MG/DL (ref 70–99)
GLUCOSE URINE, POC: NORMAL
KETONES, POC: NORMAL
LEUKOCYTE EST, POC: NORMAL
NITRITE, POC: NORMAL
PH, POC: 6.5
POTASSIUM SERPL-SCNC: 4.3 MMOL/L (ref 3.5–5.1)
PROTEIN, POC: NORMAL
SODIUM BLD-SCNC: 140 MMOL/L (ref 136–145)
SPECIFIC GRAVITY, POC: 1.02
UROBILINOGEN, POC: 0.2

## 2022-04-20 PROCEDURE — 99214 OFFICE O/P EST MOD 30 MIN: CPT | Performed by: FAMILY MEDICINE

## 2022-04-20 PROCEDURE — 81002 URINALYSIS NONAUTO W/O SCOPE: CPT | Performed by: FAMILY MEDICINE

## 2022-04-20 RX ORDER — ONDANSETRON 4 MG/1
4 TABLET, FILM COATED ORAL DAILY PRN
Qty: 30 TABLET | Refills: 0 | Status: SHIPPED | OUTPATIENT
Start: 2022-04-20

## 2022-04-20 RX ORDER — NITROFURANTOIN 25; 75 MG/1; MG/1
100 CAPSULE ORAL 2 TIMES DAILY
Qty: 14 CAPSULE | Refills: 0 | Status: SHIPPED | OUTPATIENT
Start: 2022-04-20 | End: 2022-04-27

## 2022-04-20 ASSESSMENT — PATIENT HEALTH QUESTIONNAIRE - PHQ9
3. TROUBLE FALLING OR STAYING ASLEEP: 0
10. IF YOU CHECKED OFF ANY PROBLEMS, HOW DIFFICULT HAVE THESE PROBLEMS MADE IT FOR YOU TO DO YOUR WORK, TAKE CARE OF THINGS AT HOME, OR GET ALONG WITH OTHER PEOPLE: 0
4. FEELING TIRED OR HAVING LITTLE ENERGY: 0
9. THOUGHTS THAT YOU WOULD BE BETTER OFF DEAD, OR OF HURTING YOURSELF: 0
8. MOVING OR SPEAKING SO SLOWLY THAT OTHER PEOPLE COULD HAVE NOTICED. OR THE OPPOSITE, BEING SO FIGETY OR RESTLESS THAT YOU HAVE BEEN MOVING AROUND A LOT MORE THAN USUAL: 0
7. TROUBLE CONCENTRATING ON THINGS, SUCH AS READING THE NEWSPAPER OR WATCHING TELEVISION: 0
SUM OF ALL RESPONSES TO PHQ QUESTIONS 1-9: 0
1. LITTLE INTEREST OR PLEASURE IN DOING THINGS: 0
5. POOR APPETITE OR OVEREATING: 0
6. FEELING BAD ABOUT YOURSELF - OR THAT YOU ARE A FAILURE OR HAVE LET YOURSELF OR YOUR FAMILY DOWN: 0
SUM OF ALL RESPONSES TO PHQ QUESTIONS 1-9: 0
2. FEELING DOWN, DEPRESSED OR HOPELESS: 0
SUM OF ALL RESPONSES TO PHQ QUESTIONS 1-9: 0
SUM OF ALL RESPONSES TO PHQ9 QUESTIONS 1 & 2: 0
SUM OF ALL RESPONSES TO PHQ QUESTIONS 1-9: 0

## 2022-04-20 NOTE — PROGRESS NOTES
Patient is here for urinary frequency . She started with nausea on Monday . No emesis. Some chills. Frequency started last night and got up 4 times to urinate. No diarrhea. No fever. No abdominal or back pain . She denies abnormal vaginal bleeding, itching,  discharge or unusual pelvic pain, no hematuria. No dysuria. No h/o kidney stones. Caffeine - 1 diet Coke ; 2 coffee/day . Not sexually active. 1-2 glasses wine/ night. Review of Systems    ROS: All other systems were reviewed and are negative . Patient's allergies and medications were reviewed. Patient's past medical, surgical, social , and family history were reviewed. Results for POC orders placed in visit on 04/20/22   POCT Urinalysis no Micro   Result Value Ref Range    Color, UA clear     Clarity, UA      Glucose, UA POC neg     Bilirubin, UA neg     Ketones, UA neg     Spec Grav, UA 1.020     Blood, UA POC small     pH, UA 6.5     Protein, UA POC neg     Urobilinogen, UA 0.2     Leukocytes, UA neg     Nitrite, UA neg       OBJECTIVE:  BP (!) 140/80   Pulse 85   Temp 96.8 °F (36 °C)   Wt 216 lb 12.8 oz (98.3 kg)   SpO2 98%   BMI 32.02 kg/m²     Physical Exam    General: NAD, cooperative, alert and oriented X 3. Mood / affect is good. good insight. well hydrated. Neck : no lymphadenopathy, supple, FROM  CV: Regular rate and rhythm , no murmurs/ rub/ gallop. No edema. Lungs : CTA bilaterally, breathing comfortably  Abdomen: positive bowel sounds, soft , suprapubic tenderness. No rebound/ guarding, non distended. No hepatosplenomegaly. No CVA tenderness. Skin: no rashes. Non tender. ASSESSMENT/  PLAN:  1. Urinary frequency  - Push fluids - water. Avoid caffeine/ alcohol.   - POCT Urinalysis no Micro  - Culture, Urine  - Treat empirically with Nitrofurantoin, macrocrystal-monohydrate, (MACROBID) 100 MG capsule; Take 1 capsule by mouth 2 times daily for 7 days  Dispense: 14 capsule; Refill: 0    2.  Nausea  - Push fluids - water.  Riddhi Barrack diet. - ondansetron (ZOFRAN) 4 MG tablet; Take 1 tablet by mouth daily as needed for Nausea or Vomiting  Dispense: 30 tablet; Refill: 0     F/u if no improvement 5d/ prn increased symptoms.

## 2022-04-21 LAB — URINE CULTURE, ROUTINE: NORMAL

## 2022-07-22 ENCOUNTER — HOSPITAL ENCOUNTER (OUTPATIENT)
Dept: VASCULAR LAB | Age: 57
Discharge: HOME | End: 2022-07-22
Payer: COMMERCIAL

## 2022-07-22 DIAGNOSIS — R60.0 LOCALIZED EDEMA: ICD-10-CM

## 2022-07-22 DIAGNOSIS — I87.322 CHRONIC VENOUS HYPERTENSION WITH INFLAMMATION INVOLVING LEFT SIDE: ICD-10-CM

## 2022-07-22 PROCEDURE — 93971 EXTREMITY STUDY: CPT

## 2022-07-28 RX ORDER — TIZANIDINE 4 MG/1
4 TABLET ORAL EVERY 8 HOURS PRN
Qty: 30 TABLET | Refills: 2 | Status: SHIPPED | OUTPATIENT
Start: 2022-07-28

## 2022-07-28 RX ORDER — TRAZODONE HYDROCHLORIDE 50 MG/1
50-100 TABLET ORAL NIGHTLY
Qty: 180 TABLET | Refills: 3 | Status: SHIPPED | OUTPATIENT
Start: 2022-07-28

## 2022-08-23 ENCOUNTER — OFFICE VISIT (OUTPATIENT)
Dept: FAMILY MEDICINE CLINIC | Age: 57
End: 2022-08-23
Payer: COMMERCIAL

## 2022-08-23 VITALS
WEIGHT: 221.4 LBS | TEMPERATURE: 97.9 F | DIASTOLIC BLOOD PRESSURE: 72 MMHG | BODY MASS INDEX: 32.7 KG/M2 | SYSTOLIC BLOOD PRESSURE: 128 MMHG | OXYGEN SATURATION: 99 % | HEART RATE: 92 BPM | RESPIRATION RATE: 12 BRPM

## 2022-08-23 DIAGNOSIS — M79.642 BILATERAL HAND PAIN: ICD-10-CM

## 2022-08-23 DIAGNOSIS — M79.89 LEFT LEG SWELLING: Primary | ICD-10-CM

## 2022-08-23 DIAGNOSIS — I10 ESSENTIAL HYPERTENSION: ICD-10-CM

## 2022-08-23 DIAGNOSIS — Z12.4 SCREENING FOR CERVICAL CANCER: ICD-10-CM

## 2022-08-23 DIAGNOSIS — M79.641 BILATERAL HAND PAIN: ICD-10-CM

## 2022-08-23 DIAGNOSIS — I87.2 VENOUS REFLUX: ICD-10-CM

## 2022-08-23 DIAGNOSIS — D18.03 HEMANGIOMA OF LIVER: ICD-10-CM

## 2022-08-23 LAB
A/G RATIO: 1.9 (ref 1.1–2.2)
ALBUMIN SERPL-MCNC: 4.5 G/DL (ref 3.4–5)
ALP BLD-CCNC: 82 U/L (ref 40–129)
ALT SERPL-CCNC: 24 U/L (ref 10–40)
ANION GAP SERPL CALCULATED.3IONS-SCNC: 13 MMOL/L (ref 3–16)
AST SERPL-CCNC: 18 U/L (ref 15–37)
BASOPHILS ABSOLUTE: 0 K/UL (ref 0–0.2)
BASOPHILS RELATIVE PERCENT: 0.6 %
BILIRUB SERPL-MCNC: 0.4 MG/DL (ref 0–1)
BUN BLDV-MCNC: 17 MG/DL (ref 7–20)
C-REACTIVE PROTEIN: 3.3 MG/L (ref 0–5.1)
CALCIUM SERPL-MCNC: 10 MG/DL (ref 8.3–10.6)
CHLORIDE BLD-SCNC: 101 MMOL/L (ref 99–110)
CO2: 27 MMOL/L (ref 21–32)
CREAT SERPL-MCNC: 0.9 MG/DL (ref 0.6–1.1)
EOSINOPHILS ABSOLUTE: 0 K/UL (ref 0–0.6)
EOSINOPHILS RELATIVE PERCENT: 0.7 %
GFR AFRICAN AMERICAN: >60
GFR NON-AFRICAN AMERICAN: >60
GLUCOSE BLD-MCNC: 107 MG/DL (ref 70–99)
HCT VFR BLD CALC: 44.6 % (ref 36–48)
HEMOGLOBIN: 15.5 G/DL (ref 12–16)
LYMPHOCYTES ABSOLUTE: 1.7 K/UL (ref 1–5.1)
LYMPHOCYTES RELATIVE PERCENT: 29.6 %
MCH RBC QN AUTO: 32.2 PG (ref 26–34)
MCHC RBC AUTO-ENTMCNC: 34.8 G/DL (ref 31–36)
MCV RBC AUTO: 92.6 FL (ref 80–100)
MONOCYTES ABSOLUTE: 0.4 K/UL (ref 0–1.3)
MONOCYTES RELATIVE PERCENT: 7.4 %
NEUTROPHILS ABSOLUTE: 3.5 K/UL (ref 1.7–7.7)
NEUTROPHILS RELATIVE PERCENT: 61.7 %
PDW BLD-RTO: 12.7 % (ref 12.4–15.4)
PLATELET # BLD: 201 K/UL (ref 135–450)
PMV BLD AUTO: 9.3 FL (ref 5–10.5)
POTASSIUM SERPL-SCNC: 3.6 MMOL/L (ref 3.5–5.1)
RBC # BLD: 4.81 M/UL (ref 4–5.2)
RHEUMATOID FACTOR: 28 IU/ML
SEDIMENTATION RATE, ERYTHROCYTE: 9 MM/HR (ref 0–30)
SODIUM BLD-SCNC: 141 MMOL/L (ref 136–145)
TOTAL PROTEIN: 6.9 G/DL (ref 6.4–8.2)
WBC # BLD: 5.7 K/UL (ref 4–11)

## 2022-08-23 PROCEDURE — 99214 OFFICE O/P EST MOD 30 MIN: CPT | Performed by: FAMILY MEDICINE

## 2022-08-23 RX ORDER — ESCITALOPRAM OXALATE 5 MG/1
TABLET ORAL
COMMUNITY
Start: 2022-07-05 | End: 2022-09-20 | Stop reason: SDUPTHER

## 2022-08-23 ASSESSMENT — PATIENT HEALTH QUESTIONNAIRE - PHQ9
6. FEELING BAD ABOUT YOURSELF - OR THAT YOU ARE A FAILURE OR HAVE LET YOURSELF OR YOUR FAMILY DOWN: 0
1. LITTLE INTEREST OR PLEASURE IN DOING THINGS: 0
4. FEELING TIRED OR HAVING LITTLE ENERGY: 0
SUM OF ALL RESPONSES TO PHQ QUESTIONS 1-9: 0
7. TROUBLE CONCENTRATING ON THINGS, SUCH AS READING THE NEWSPAPER OR WATCHING TELEVISION: 0
SUM OF ALL RESPONSES TO PHQ9 QUESTIONS 1 & 2: 0
2. FEELING DOWN, DEPRESSED OR HOPELESS: 0
3. TROUBLE FALLING OR STAYING ASLEEP: 0
SUM OF ALL RESPONSES TO PHQ QUESTIONS 1-9: 0
10. IF YOU CHECKED OFF ANY PROBLEMS, HOW DIFFICULT HAVE THESE PROBLEMS MADE IT FOR YOU TO DO YOUR WORK, TAKE CARE OF THINGS AT HOME, OR GET ALONG WITH OTHER PEOPLE: 0
5. POOR APPETITE OR OVEREATING: 0
8. MOVING OR SPEAKING SO SLOWLY THAT OTHER PEOPLE COULD HAVE NOTICED. OR THE OPPOSITE, BEING SO FIGETY OR RESTLESS THAT YOU HAVE BEEN MOVING AROUND A LOT MORE THAN USUAL: 0
9. THOUGHTS THAT YOU WOULD BE BETTER OFF DEAD, OR OF HURTING YOURSELF: 0

## 2022-08-23 NOTE — PROGRESS NOTES
Here for f/u of some issues of asymetrical LLE swelling. Pt has had for several years but feels that it is progressive. Pt is trying to exercise, lose weight but with persistent sx. Pt did have doppler that showed no evidence of DVT. Pt is UTD on seeing gynecology. Did have ultrasound ofliver     Pt has noted some stiffness in hands, present overnight and first thing in the AM.  Sx for a few weeks and can barely bend hands. Sx are moderate, but once she gets moving does better. Pt does feel that swelling is significant in the AM.  Hands is nwe, present for a few weeks. Moderate discomfort in 5th digit PIP joint. Pt states that after a few minutes or so, sx improved. Pt does have family hx of arthritis. Pt here for follow up of blood pressure. Pt states doing great with adherence to therapy and feels well. No issues of chest pain, shortness of breath. No vision changes, headache, swelling in legs. Except as noted above in the history of present illness, the review of systems is  negative for headache, vision changes, chest pain, shortness of breath, abdominal pain, urinary sx, bowel changes. Past medical, surgical, and social history reviewed and updated  Medications and allergies reviewed and updated      O: /72   Pulse 92   Temp 97.9 °F (36.6 °C) (Temporal)   Resp 12   Wt 221 lb 6.4 oz (100.4 kg)   SpO2 99%   BMI 32.70 kg/m²   GEN: No acute distress, cooperative, well nourished, alert. HEENT: PEERLA, EOMI , normocephalic/atraumatic, nares and oropharynx clear. Mucous membranes normal, Tympanic membranes clear bilaterally. Neck: soft, supple, no thyromegaly, mass, no Lymphadenopathy  CV: Regular rate and rhythm, no murmur, rubs, gallops. No edema. Resp: Clear to auscultation bilaterally good air entry bilaterally  No crackles, wheeze. Breathing comfortably.    Psych: mood stable, No suicidal thoughts or ideation   Musc: full range of motion bilateral upper extremities,no visible synovitis, no erythema. Mild testosterone to R 5th digit PIP joint  Ext: LLE with diffuse 1+ edema, > RLE        ASSESSMENT / PLAN:    1. Left leg swelling  Chroinc sx  Reviewed doppler with presence of deep reflux   No s/s of CKD, CHF  Does need eval of pelvis to r/o obstructive lesion, discussed imaging  Pt will see gyne first, Management pending results. 2. Hemangioma of liver  unchanged    3. Bilateral hand pain  Exam nonfocal  Trial aleve 2 tabs po QHS and monitor with bloodwork asbelow to r/o CTD/RA  Management pending results. - CBC with Auto Differential; Future  - Sedimentation Rate; Future  - C-Reactive Protein; Future  - REGGIE Reflex to Antibody Cascade; Future  - Rheumatoid Factor; Future  - Cyclic Citrul Peptide Antibody, IgG; Future    4. Essential hypertension  stable  - Comprehensive Metabolic Panel; Future    5. Screening for cervical cancer  Refer gyne  - [address], [facility], DO, Gynecology, [facility]    6. Venous reflux  Reviewed doppler  Await eval by gyne  Consider referral to vascular surgery           Follow-up appointment:   Pending bloodwork results/prn     Discussed use, benefit, and side effects of all prescribed medications. Barriers to medication compliance addressed. All patient questions answered. Pt voiced understanding. When applicable, patient's outside records were reviewed through [facility]. The patient has signed appropriate paperworks/consents.

## 2022-08-24 ENCOUNTER — PATIENT MESSAGE (OUTPATIENT)
Dept: FAMILY MEDICINE CLINIC | Age: 57
End: 2022-08-24

## 2022-08-24 LAB
ANTI-NUCLEAR ANTIBODY (ANA): NEGATIVE
CYCLIC CITRULLINATED PEPTIDE ANTIBODY IGG: <0.5 U/ML (ref 0–2.9)

## 2022-08-24 NOTE — TELEPHONE ENCOUNTER
From: Pieter Leblanc  To: Dr. Enriqueta Morillo: 8/24/2022 9:20 AM EDT  Subject: Lab results     Can you please fax a copy of my recent lab results to Dr. Serafin Ortega @ [phone]    Thanks!

## 2022-08-25 ENCOUNTER — PATIENT MESSAGE (OUTPATIENT)
Dept: FAMILY MEDICINE CLINIC | Age: 57
End: 2022-08-25

## 2022-08-25 DIAGNOSIS — M79.642 BILATERAL HAND PAIN: Primary | ICD-10-CM

## 2022-08-25 DIAGNOSIS — R76.8 ELEVATED RHEUMATOID FACTOR: ICD-10-CM

## 2022-08-25 DIAGNOSIS — M79.641 BILATERAL HAND PAIN: Primary | ICD-10-CM

## 2022-08-25 NOTE — TELEPHONE ENCOUNTER
From: Leanne Almaguer  To: Dr. Che Gins: 8/25/2022 7:23 AM EDT  Subject: Referral    Good morning,  Ive taken the naproxen the past few nights with no change. My hands were bad this morning. Would you suggest seeing a Rheumatologist?     Thanks!   Placido Sun

## 2022-08-29 ENCOUNTER — PATIENT MESSAGE (OUTPATIENT)
Dept: FAMILY MEDICINE CLINIC | Age: 57
End: 2022-08-29

## 2022-08-29 RX ORDER — FUROSEMIDE 20 MG/1
20 TABLET ORAL DAILY
Qty: 30 TABLET | Refills: 5 | Status: SHIPPED | OUTPATIENT
Start: 2022-08-29

## 2022-08-29 NOTE — TELEPHONE ENCOUNTER
From: Leanne Almaguer  To: Dr. Che Gins: 8/29/2022 8:38 AM EDT  Subject: Edema    Hello,  My edema in both feet and lower legs was bad over the weekend. Can I take lasix short term to help with this? I had trouble with tinnitus when I took furosemide many years ago but I am willing to try it again short term. Maxzide isnt helping with edema.  [address] point please    Thanks,   Placido Sun

## 2022-08-30 LAB
CHOLESTEROL, TOTAL: 226 MG/DL (ref 0–199)
GLUCOSE BLD-MCNC: 103 MG/DL (ref 70–99)
HDLC SERPL-MCNC: 50 MG/DL (ref 40–60)
LDL CHOLESTEROL CALCULATED: 146 MG/DL
TRIGL SERPL-MCNC: 149 MG/DL (ref 0–150)

## 2022-09-20 RX ORDER — TRIAMTERENE AND HYDROCHLOROTHIAZIDE 75; 50 MG/1; MG/1
TABLET ORAL
Qty: 90 TABLET | Refills: 1 | Status: SHIPPED | OUTPATIENT
Start: 2022-09-20

## 2022-09-20 RX ORDER — DESVENLAFAXINE 50 MG/1
TABLET, EXTENDED RELEASE ORAL
Qty: 90 TABLET | Refills: 1 | OUTPATIENT
Start: 2022-09-20

## 2022-09-20 RX ORDER — TRIAMTERENE AND HYDROCHLOROTHIAZIDE 75; 50 MG/1; MG/1
TABLET ORAL
Qty: 90 TABLET | Refills: 1 | OUTPATIENT
Start: 2022-09-20

## 2022-09-20 RX ORDER — ROSUVASTATIN CALCIUM 10 MG/1
TABLET, COATED ORAL
Qty: 90 TABLET | Refills: 1 | OUTPATIENT
Start: 2022-09-20

## 2022-09-20 RX ORDER — ROSUVASTATIN CALCIUM 10 MG/1
TABLET, COATED ORAL
Qty: 90 TABLET | Refills: 1 | Status: SHIPPED | OUTPATIENT
Start: 2022-09-20

## 2022-09-20 RX ORDER — ESCITALOPRAM OXALATE 5 MG/1
5 TABLET ORAL DAILY
Qty: 90 TABLET | Refills: 3 | Status: SHIPPED | OUTPATIENT
Start: 2022-09-20 | End: 2022-11-03 | Stop reason: SDUPTHER

## 2022-09-20 NOTE — TELEPHONE ENCOUNTER
Requested Prescriptions     Pending Prescriptions Disp Refills    triamterene-hydroCHLOROthiazide (MAXZIDE) 75-50 MG per tablet [Pharmacy Med Name: TRIAMTERENE-HCTZ 75-50MG TABS] 90 tablet 1     Sig: TAKE 1 TABLET BY MOUTH ONE TIME A DAY    rosuvastatin (CRESTOR) 10 MG tablet [Pharmacy Med Name: ROSUVASTATIN CALCIUM 10MG TABS] 90 tablet 1     Sig: TAKE ONE TABLET BY MOUTH NIGHTLY    desvenlafaxine succinate (PRISTIQ) 50 MG TB24 extended release tablet [Pharmacy Med Name: DESVENLAFAXINE SUCCINATE ER 50 TB24] 90 tablet 1     Sig: TAKE 1 TABLET BY MOUTH ONE TIME A DAY     Last ov 9/85/7339  Duplicate.      Prescriptions sent 9/20/22

## 2022-09-26 NOTE — PROGRESS NOTES
[address], [place]                                                           [address] [place] [address]                                                             [phone] (P) [phone] (F)      Note is transcribed using voice recognition software. Inadvertent computerized transcription errors may be present. Patient identification: Anita Almendarez, female : 1965,57 y.o.        ASSESSMENT AND PLAN:  Tahira Helm was seen today for abnormal test results, pain and establish care. Diagnoses and all orders for this visit:    Rheumatoid arthritis involving multiple sites with positive rheumatoid factor (HCC)  -     Hepatitis B Surface Antigen; Future  -     Hepatitis B Core Antibody, IgM; Future  -     Hepatitis B Surface Antibody; Future  -     Hepatitis C Antibody; Future  -     hydroxychloroquine (PLAQUENIL) 200 MG tablet; Take 1 tablet by mouth 2 times daily    High risk medication use    Other orders  -     predniSONE (DELTASONE) 10 MG tablet; 2 tab po daily x 7 days. 1.5 tab po daily x 7 days. 1 tab po daily x 7 days. 1/2 tab po daily 7 days and stop. Seropositive rheumatoid arthritis-RF 28, CCP negative. Symptom onset approximately 2 months. Mild disease activity. Explained diagnosis, management options, prognosis, and need to control inflammation tightly in order to prevent joint damage, joint deformities as well as systemic involvement from rheumatoid arthritis. Explained to patient that rheumatoid arthritis is not just a joint disease but is a systemic inflammatory disease can affect any organs if inflammation is not optimally controlled. I also provided her information on rheumatoid arthritis.    -Work-up as above.   -Reviewed various DMARDs, given mild Wrist           Elbow           Shoulder          Knee             Loose fist right hand. Subjective arthralgias across all MCPs, PIPs bilaterally, right side worse than the left side. Ankles are tender in the medial and lateral joint line as well as the MTPs are tender bilaterally. Rest of the musculoskeletal exam is unrevealing. Skin: No rashes, no induration or skin thickening or nodules. DATA:   Lab Results   Component Value Date    WBC 5.7 08/23/2022    HGB 15.5 08/23/2022    HCT 44.6 08/23/2022    MCV 92.6 08/23/2022     08/23/2022     Lab Results   Component Value Date     08/23/2022    K 3.6 08/23/2022     08/23/2022    CO2 27 08/23/2022    BUN 17 08/23/2022    CREATININE 0.9 08/23/2022    GLUCOSE 103 (H) 08/30/2022    CALCIUM 10.0 08/23/2022    PROT 6.9 08/23/2022    LABALBU 4.5 08/23/2022    BILITOT 0.4 08/23/2022    ALKPHOS 82 08/23/2022    AST 18 08/23/2022    ALT 24 08/23/2022    LABGLOM >60 08/23/2022    GFRAA >60 08/23/2022    AGRATIO 1.9 08/23/2022    GLOB 2.1 05/27/2021       Lab Results   Component Value Date    SEDRATE 9 08/23/2022     Lab Results   Component Value Date    CRP 3.3 08/23/2022     RF 28    Total time >60 minutes that includes the following-  Preparing to see the patient such as reviewing patients records, pre-charting, preparing the visit on the same day, performing a medically appropriate history and physical examination, counseling and educating patient about diagnosis, management plan, ordering appropriate testings, prescriptions, communicating findings to other care providers, and documenting clinical information in electronic medical record. I thank you for giving me the opportunity to be involved in [facility] care and I look forward following Adalgisa Flaherty along with you. If you have any questions or concerns please feel free to contact me at any time.   Leti Sparks MD 09/27/22

## 2022-09-27 ENCOUNTER — OFFICE VISIT (OUTPATIENT)
Dept: RHEUMATOLOGY | Age: 57
End: 2022-09-27
Payer: COMMERCIAL

## 2022-09-27 VITALS
SYSTOLIC BLOOD PRESSURE: 120 MMHG | DIASTOLIC BLOOD PRESSURE: 68 MMHG | WEIGHT: 223 LBS | HEIGHT: 69 IN | BODY MASS INDEX: 33.03 KG/M2

## 2022-09-27 DIAGNOSIS — Z79.899 HIGH RISK MEDICATION USE: ICD-10-CM

## 2022-09-27 DIAGNOSIS — M05.79 RHEUMATOID ARTHRITIS INVOLVING MULTIPLE SITES WITH POSITIVE RHEUMATOID FACTOR (HCC): ICD-10-CM

## 2022-09-27 DIAGNOSIS — M05.79 RHEUMATOID ARTHRITIS INVOLVING MULTIPLE SITES WITH POSITIVE RHEUMATOID FACTOR (HCC): Primary | ICD-10-CM

## 2022-09-27 LAB
HBV SURFACE AB TITR SER: 62.47 MIU/ML
HEPATITIS B CORE IGM ANTIBODY: NORMAL
HEPATITIS B SURFACE ANTIGEN INTERPRETATION: NORMAL
HEPATITIS C ANTIBODY INTERPRETATION: NORMAL

## 2022-09-27 PROCEDURE — 99204 OFFICE O/P NEW MOD 45 MIN: CPT | Performed by: INTERNAL MEDICINE

## 2022-09-27 RX ORDER — HYDROXYCHLOROQUINE SULFATE 200 MG/1
200 TABLET, FILM COATED ORAL 2 TIMES DAILY
Qty: 60 TABLET | Refills: 2 | Status: SHIPPED | OUTPATIENT
Start: 2022-09-27 | End: 2022-10-18 | Stop reason: SDUPTHER

## 2022-09-27 RX ORDER — PREDNISONE 10 MG/1
TABLET ORAL
Qty: 35 TABLET | Refills: 0 | Status: SHIPPED | OUTPATIENT
Start: 2022-09-27

## 2022-09-27 NOTE — PATIENT INSTRUCTIONS
RHEUMATOID ARTHRITIS People have long feared rheumatoid arthritis (commonly called RA) as one of the most disabling types of arthritis. The good news is that the outlook has greatly improved for many people with newly diagnosed (detected) RA. Of course, RA remains a serious disease, and one that can vary widely in symptoms (what you feel) and outcomes. Even so, treatment advances have made it possible to stop or at least slow the progression (worsening) of joint damage. Rheumatologists now have many new treatments that target the inflammation that RA causes. They also understand better when and how to use treatments to get the best effects. Fast facts  RA is an autoimmune disease. A faulty immune system (the bodys defense system) triggers it. RA is the most common type of autoimmune arthritis. At least 1.3 million [place] adults have RA. Treatments have improved greatly and help many of those affected. Rheumatologists are doctors who have the expertise to correctly diagnose this disease and to offer patients the most advanced treatments. What is rheumatoid arthritis? RA is a chronic (long-term) disease that causes pain, stiffness, swelling and limited motion and function of many joints. While RA can affect any joint, the small joints in the hands and feet tend to be involved most often. Inflammation sometimes can affect organs as well, for instance, the eyes or lungs. The stiffness seen in active RA is most often worst in the morning. It may last one to two hours (or even the whole day). Stiffness for a long time in the morning is a clue that you may have RA, since few other arthritic diseases behave this way. For instance, osteoarthritis most often does not cause prolonged morning stiffness. Other signs and symptoms that can occur in RA include:   Loss of energy   Low fevers   Loss of appetite   Dry eyes and mouth from a related health problem, Sjogren's syndrome   Firm lumps, called rheumatoid nodules, which grow beneath the skin in places such as the elbow and hands  The normal joint structure appears on the left. On the right is the joint with rheumatoid arthritis. RA causes synovitis, pain and swelling of the synovium (the tissue that lines the joint). This can make cartilage (the tissue that cushions between joints) and bone erode, or wear away. What causes rheumatoid arthritis? RA is an autoimmune disease. This means that certain cells of the immune system do not work properly and start attacking healthy tissues -- the joints in RA. The cause of RA is not known. Yet, new research is giving us a better idea of what makes the immune system attack the body and create inflammation. In RA, the focus of the inflammation is in the synovium, the tissue that lines the joint. Immune cells release inflammation-causing chemicals. These chemicals can damage cartilage (the tissue that cushions between joints) and bone. Other things likely play a role in RA as well. For instance, genes that affect the immune system may make some people more prone to getting RA. Who gets rheumatoid arthritis? RA is the most common form of autoimmune arthritis, affecting more than 1.3 million Americans. Of these, about 75% are women. In fact, 1-3% of women may get rheumatoid arthritis in their lifetime. The disease most often begins between the fourth and sixth decades of life. However, RA can start at any age. How is rheumatoid arthritis diagnosed? RA can be hard to detect because it may begin with subtle symptoms, such as achy joints or a little stiffness in the morning. Also, many diseases behave like RA early on. For this reason, if you or your primary care physician thinks you have RA, you should see a rheumatologist. A rheumatologist is a physician with the skill and knowledge to reach a correct diagnosis of RA and to make the most suitable treatment plan.   Diagnosis of RA depends on the symptoms and results of a their treatment with disease-modifying antirheumatic drugs -- referred to as DMARDs. These drugs not only relieve symptoms but also slow progression of the disease. Often, doctors prescribe DMARDs along with nonsteroidal anti-inflammatory drugs or NSAIDs and/or low-dose corticosteroids, to lower swelling, pain and fever. DMARDs have greatly improved the symptoms, function and quality of life for nearly all patients with RA. Ask your rheumatologist about the need for DMARD therapy and the risks and benefits of these drugs. Common DMARDs include methotrexate (brand names include Rheumatrex® and Folex®), leflunomide ([address]), hydroxychloroquine (Plaquenil) and sulfasalazine (Azulfidine). Older DMARDs include gold, given as a pill -- auranofin (Ridaura) -- or more often as an injection into a muscle (such as Myochrysine). The antibiotic minocycline (e.g., Minocin, Dynacin and Vectrin) also is a DMARD, as are the immune suppressants azathioprine (Imuran) and cyclosporine (Sandimmune and Neoral). These three drugs and gold are rarely prescribed for RA these days because other drugs work better or have fewer side effects. Patients with more serious disease may need medications called biologic response modifiers or biologic agents.  They can target the parts of the immune system and the signals that lead to inflammation and joint and tissue damage. These medications are also DMARDs. FDA-approved drugs of this type include abatacept (Orencia), adalimumab (Humira), anakinra (Kineret), certolizumab (Cimzia), etanercept (Enbrel), golimumab (Simponi) infliximab (Remicade), rituximab (Rituxan) and tocilizumab (Actemra). Most often, patients take these drugs with methotrexate, as the mix of medicines is more helpful. The best treatment of RA needs more than medicines alone. Patient education, such as how to cope with RA, also is important.  Proper care requires the expertise of a team of providers, including rheumatologists, primary care physicians, and physical and occupational therapists. You will need frequent visits through the year with your rheumatologist. These checkups let your doctor track the course of your disease and check for any side effects of your medications. You likely also will need to repeat blood tests and X-rays or ultrasounds from time to time. What is the broader health impact of rheumatoid arthritis? Research shows that people with RA, mainly those whose disease is not well controlled, have a higher risk for heart disease and stroke. Talk with your doctor about these risks and ways to lower them. Living with rheumatoid arthritis   It is important to be physically active most of the time, but to sometimes scale back activities when the disease flares. In general, rest is helpful when a joint is inflamed, or when you feel tired. At these times, do gentle range-of-motion exercises, such as stretching. This will keep the joint flexible. When you feel better, do low-impact aerobic exercises, such as walking, and exercises to boost muscle strength. This will improve your overall health and reduce pressure on your joints. A physical or occupational therapist can help you find which types of activities are best for you, and at what level or pace you should do them. Finding that you have a chronic illness is a life-changing event. It can cause worry and sometimes feelings of isolation or depression. Thanks to greatly improved treatments, these feelings tend to decrease with time as energy improves, and pain and stiffness decrease. Discuss these normal feelings with your health care providers. They can provide helpful information and resources. Rheumatoid arthritis affects the wrist and the small joints of the hand, including the knuckles and the middle joints of the fingers. Points to remember  Newer treatments are effective. RA drugs have greatly improved outcomes for patients.  For most people with RA, early treatment can control joint pain and swelling, and lessen joint damage. Seek an expert in arthritis: a rheumatologist. Expertise is vital to make an early diagnosis of RA and to rule out diseases that mimic RA, thus avoiding unneeded tests and treatments. A doctor who is an expert in RA also can design a customized treatment plan that is best suited for you. Therefore, the rheumatologist, working with the primary care physician and other health care providers, should supervise the treatment of the patient with RA. Start treatment early. Studies show that people who receive early treatment for RA feel better sooner and more often, and are more likely to lead an active life. They also are less likely to have the type of joint damage that leads to joint replacement. The rheumatologist's role in the treatment of rheumatoid arthritis   RA is a complex disease, but many advances in treatment have occurred recently. Rheumatologists are doctors who are experts in diagnosing and treating arthritis and other diseases of the joints, muscles and bones. Thus, they are best qualified to make a proper diagnosis of RA. They can also advise patients about the best treatment options. To find a rheumatologist  For a list of rheumatologists in your area, click here. Learn more about rheumatologists and rheumatology health professionals. For more information  The [employer] of Rheumatology has compiled this list to give you a starting point for your own additional research. The ACR does not endorse or maintain these web sites, and is notresponsible for any information or claims provided on them. It is always best to talk with your rheumatologist for more information and before making any decisions about your care. Rheumatology [address] advances research and training to improve the health of people with rheumatic diseases. www. rheumatology. org/REF  The Arthritis Foundation  www. arthritis. [employer] of Arthritis and Musculoskeletal and [address]  www.niams. nih.gov   Updated August 2012  Written by Daja Meng MD, and Cornelia Rios MD, and reviewed by the [facility] of Rheumatology Communications and Marketing Committee. This patient fact sheet is provided for general education only. Individuals should consult a qualified health care provider for professional medical advice, diagnosis and treatment of a medical or health condition.   [address] [facility] of Rheumatology

## 2022-10-10 ENCOUNTER — OFFICE VISIT (OUTPATIENT)
Dept: OBGYN CLINIC | Age: 57
End: 2022-10-10
Payer: COMMERCIAL

## 2022-10-10 VITALS
SYSTOLIC BLOOD PRESSURE: 132 MMHG | HEART RATE: 71 BPM | DIASTOLIC BLOOD PRESSURE: 86 MMHG | TEMPERATURE: 97.7 F | WEIGHT: 221.4 LBS | BODY MASS INDEX: 32.79 KG/M2 | HEIGHT: 69 IN

## 2022-10-10 DIAGNOSIS — N60.01 CYST OF RIGHT BREAST: ICD-10-CM

## 2022-10-10 DIAGNOSIS — Z01.419 ENCNTR FOR GYN EXAM (GENERAL) (ROUTINE) W/O ABN FINDINGS: Primary | ICD-10-CM

## 2022-10-10 DIAGNOSIS — R60.0 LEG EDEMA: ICD-10-CM

## 2022-10-10 DIAGNOSIS — Z12.4 PAP SMEAR FOR CERVICAL CANCER SCREENING: ICD-10-CM

## 2022-10-10 PROCEDURE — 99386 PREV VISIT NEW AGE 40-64: CPT | Performed by: OBSTETRICS & GYNECOLOGY

## 2022-10-10 ASSESSMENT — ENCOUNTER SYMPTOMS
SORE THROAT: 0
NAUSEA: 0
SHORTNESS OF BREATH: 0
CONSTIPATION: 1
ABDOMINAL PAIN: 0
COUGH: 0
VOMITING: 0
DIARRHEA: 1

## 2022-10-10 NOTE — PROGRESS NOTES
Annual Exam      CC:   Chief Complaint   Patient presents with    Annual Exam       HPI:  56 y.o. [id number] presents for her gynecologic annual exam.    Patient seen and examined. Patient is overall doing well. Patient has had some issues with swelling in her lower extremity. A venous duplex US was performed with some valvular concerns high within her left groin and was recommended to follow-up for pelvic causes of swelling. Menses stopped 4 years ago. Has not had bleeding since that time. Has a right breast cyst for which she is following with breast specialist.      Medical history significant for IBS, RA, GERD, anxiety and depression. Surgical history includes D&C, laparoscopy x3 for endometriosis, cholecystectomy, appendectomy. Has a lesion on her kidney that she is following with Dr. Geneva Crump for. Obstetric history significant for  x2. Denies history of shoulder dystocia, high order lacerations or postpartum hemorrhage. Health Maintenance:  Birth control: Menopause  Safe relationship: Single    Screening:  Last pap smear: 1 year ago   History of abnormal pap smears: Yes - 6 years ago, did not have biopsies, repeat was within normal limits. Mammogram: 2022 - Birads 1  Colonoscopy: 2021 - recommend 3 year repeat    Vaccines:  Flu vaccine: Has not had yet for this year   Zoster vaccine: Has had series and booster    Review of Systems:   Review of Systems   Constitutional:  Negative for chills and fever. HENT:  Negative for congestion and sore throat. Respiratory:  Negative for cough and shortness of breath. Cardiovascular:  Positive for leg swelling. Negative for chest pain and palpitations. Gastrointestinal:  Positive for constipation (IBS) and diarrhea (IBS). Negative for abdominal pain, nausea and vomiting. Genitourinary:  Negative for dysuria, frequency, menstrual problem, pelvic pain, vaginal bleeding and vaginal discharge.    Neurological:  Positive for headaches (retinal migrain, with aura). Negative for dizziness. Breast: Denies skin changes, nipple discharge, lesions, dimpling, tenderness or palpable masses     Primary Care Physician: Davion Medina MD    Obstetric History  OB History    Para Term  AB Living   2 2 2     2   SAB IAB Ectopic Molar Multiple Live Births             2      # Outcome Date GA Lbr Edilson/2nd Weight Sex Delivery Anes PTL Lv   2 Term 10/04/93 40w0d  7 lb 10 oz (3.459 kg) M Vag-Spont None  AIRAM   1 Term 90 40w0d  8 lb 7 oz (3.827 kg) F Vag-Spont EPI  AIRAM       Gynecologic History  Menstrual History:  Age of Menarche: 17  Postmenopausal bleeding: Denies    Sexual History:  Contraception: see above  Currently is not sexually active  6 Lifetime partners  Denies history of STIs  Reports sexual problems - has had pain in the past with endo    Pap History:  History of abnormal pap smears: see above  Last pap: see above      Medical History:  Past Medical History:   Diagnosis Date    Abnormal Pap smear of cervix     Autoimmune disorder ([facility] [address]     Breast disorder     DDD (degenerative disc disease), cervical     resolved s/p surgery    Depression with anxiety     Endometriosis     Essential hypertension 2019    GERD (gastroesophageal reflux disease)     Hemangioma of liver 2022    Hypercholesterolemia     Hyperglycemia     Menopause ovarian failure     Migraine     PONV (postoperative nausea and vomiting)     with general anesthesia    Spastic colon        Medications:  Current Outpatient Medications   Medication Sig Dispense Refill    predniSONE (DELTASONE) 10 MG tablet 2 tab po daily x 7 days. 1.5 tab po daily x 7 days. 1 tab po daily x 7 days. 1/2 tab po daily 7 days and stop.  35 tablet 0    triamterene-hydroCHLOROthiazide (MAXZIDE) 75-50 MG per tablet TAKE 1 TABLET BY MOUTH ONE TIME A DAY 90 tablet 1    rosuvastatin (CRESTOR) 10 MG tablet TAKE ONE TABLET BY MOUTH NIGHTLY 90 tablet 1    escitalopram (LEXAPRO) 5 MG tablet Take 1 tablet by mouth daily (Patient taking differently: Take 10 mg by mouth daily) 90 tablet 3    furosemide (LASIX) 20 MG tablet Take 1 tablet by mouth daily 30 tablet 5    traZODone (DESYREL) 50 MG tablet Take 1-2 tablets by mouth nightly 180 tablet 3    tiZANidine (ZANAFLEX) 4 MG tablet Take 1 tablet by mouth every 8 hours as needed (muscle spasm) 30 tablet 2    ondansetron (ZOFRAN) 4 MG tablet Take 1 tablet by mouth daily as needed for Nausea or Vomiting 30 tablet 0    Omega-3 Fatty Acids (FISH OIL) 1000 MG CAPS Take 3,000 mg by mouth 2 times daily      Multiple Vitamins-Minerals (THERAPEUTIC MULTIVITAMIN-MINERALS) tablet Take 1 tablet by mouth daily      hyoscyamine (ANASPAZ;LEVSIN) 125 MCG tablet Take 125 mcg by mouth every 4 hours as needed for Cramping      Probiotic Product (PROBIOTIC ADVANCED PO) Take by mouth daily      hydroxychloroquine (PLAQUENIL) 200 MG tablet Take 1 tablet by mouth 2 times daily 60 tablet 2    tretinoin (RETIN-A) 0.025 % cream Apply a pea sized amount to the face every other night increasing to nightly as tolerated (Patient not taking: No sig reported) 45 g 3     No current facility-administered medications for this visit. Surgical History:  Past Surgical History:   Procedure Laterality Date    APPENDECTOMY      CERVICAL LAMINECTOMY  2010    CHOLECYSTECTOMY      COLONOSCOPY      COLONOSCOPY  2/7/2022    COLONOSCOPY POLYPECTOMY SNARE/COLD BIOPSY performed by Víctor Ward MD at [facility]      d/t bleeding    LAPAROSCOPY      x3 for endometriosis    UPPER GASTROINTESTINAL ENDOSCOPY N/A 10/29/2020    EGD BIOPSY performed by Víctor Ward MD at [place] [address] N/A 2/7/2022    EGD BIOPSY performed by Víctor Ward MD at [address] [place] [address] Street:   Allergies   Allergen Reactions    Lasix [Furosemide]      tinnitus      Naltrexone Nausea Only     Dizziness    Oxycodone-Acetaminophen Itching    Atorvastatin myalgia         Family History:  Family History   Problem Relation Age of Onset    Breast Cancer Mother 66        ER/CO+ HER2-    Cancer Father         kidney    Breast Cancer Paternal Cousin 48    Cancer Paternal Great Grandmother         melanoma? Denies personal/family history of cervical, uterine, ovarian, vulvar, breast, or colon cancers. - Mother - breast cancer - age 66  Denies personal/family history of bleeding or clotting disorders  Denies personal/family history of genetic disorders    Social History:  Social History     Socioeconomic History    Marital status:      Spouse name: None    Number of children: None    Years of education: None    Highest education level: None   Tobacco Use    Smoking status: Never    Smokeless tobacco: Never   Vaping Use    Vaping Use: Never used   Substance and Sexual Activity    Alcohol use: Yes     Alcohol/week: 7.0 standard drinks     Types: 7 Glasses of wine per week    Drug use: No    Sexual activity: Yes     Partners: Male       Objective:  /86 (Site: Left Upper Arm, Position: Sitting, Cuff Size: Medium Adult)   Pulse 71   Temp 97.7 °F (36.5 °C) (Infrared)   Ht 5' 9\" (1.753 m)   Wt 221 lb 6.4 oz (100.4 kg)   BMI 32.70 kg/m²     Exam:   Physical Exam  Vitals reviewed. Exam conducted with a chaperone present. Constitutional:       General: She is not in acute distress. Appearance: Normal appearance. HENT:      Head: Normocephalic and atraumatic. Eyes:      Conjunctiva/sclera: Conjunctivae normal.   Cardiovascular:      Rate and Rhythm: Normal rate. Pulmonary:      Effort: Pulmonary effort is normal. No respiratory distress. Chest:   Breasts:     Breasts are symmetrical.      Right: No mass, nipple discharge, skin change or tenderness. Left: No mass, nipple discharge, skin change or tenderness. Abdominal:      General: There is no distension. Palpations: Abdomen is soft. There is no mass.       Tenderness: There is no abdominal tenderness. There is no guarding or rebound. Genitourinary:     General: Normal vulva. Exam position: Lithotomy position. Labia:         Right: No rash, tenderness or lesion. Left: No rash, tenderness or lesion. Urethra: No prolapse, urethral pain, urethral swelling or urethral lesion. Vagina: No signs of injury. No vaginal discharge, erythema, tenderness, bleeding or lesions. Cervix: Friability present. No cervical motion tenderness, discharge, lesion, erythema or cervical bleeding. Uterus: Not deviated, not enlarged, not fixed and not tender. Adnexa:         Right: No mass, tenderness or fullness. Left: No mass, tenderness or fullness. Rectum: Normal.   Musculoskeletal:         General: Swelling (mild) present. Cervical back: Neck supple. No tenderness. Skin:     General: Skin is warm and dry. Psychiatric:         Mood and Affect: Mood normal.         Behavior: Behavior normal.         Thought Content: Thought content normal.       Assessment/Plan:  56 y.o. [id number] presenting for her annual exam:    1. Encntr for gyn exam (general) (routine) w/o abn findings     - Pap smear collected today - will call with results     - Age based screening recommendations discussed     - Self breast exams/awareness discussed with the patient     - Healthy lifestyle habits discussed     - Will follow-up in 1 year for annual exam     2. Pap smear for cervical cancer screening     - Pap smear collected today - will call with results     - Age based screening recommendations discussed    3. Leg edema     - Patient with a history of left lower extremity edema     - Duplex US revealed valvular concerns within the groin     - Recommendations for evaluation for pelvic etiology of edema     - Benign clinical exam     - Will follow-up with US to rule out mass effect from etiologies such as uterine fibroid or ovarian mass    4.  Cyst of right breast     - Stable retro-areolar cyst     - Follows with breast specialist regularly        Fabiola Pappas DO

## 2022-10-13 ENCOUNTER — OFFICE VISIT (OUTPATIENT)
Dept: SURGERY | Age: 57
End: 2022-10-13
Payer: COMMERCIAL

## 2022-10-13 ENCOUNTER — HOSPITAL ENCOUNTER (OUTPATIENT)
Dept: ULTRASOUND IMAGING | Age: 57
Discharge: HOME | End: 2022-10-13
Payer: COMMERCIAL

## 2022-10-13 VITALS
DIASTOLIC BLOOD PRESSURE: 78 MMHG | WEIGHT: 221 LBS | HEIGHT: 69 IN | RESPIRATION RATE: 18 BRPM | OXYGEN SATURATION: 98 % | SYSTOLIC BLOOD PRESSURE: 128 MMHG | HEART RATE: 72 BPM | BODY MASS INDEX: 32.73 KG/M2

## 2022-10-13 DIAGNOSIS — N63.10 BREAST MASS, RIGHT: ICD-10-CM

## 2022-10-13 DIAGNOSIS — Z80.3 FAMILY HISTORY OF BREAST CANCER: Primary | ICD-10-CM

## 2022-10-13 DIAGNOSIS — N63.41 SUBAREOLAR MASS OF RIGHT BREAST: Primary | ICD-10-CM

## 2022-10-13 DIAGNOSIS — R92.8 ABNORMAL FINDING ON BREAST IMAGING: ICD-10-CM

## 2022-10-13 DIAGNOSIS — Z12.39 ENCOUNTER FOR SCREENING BREAST EXAMINATION: ICD-10-CM

## 2022-10-13 DIAGNOSIS — Z80.3 FAMILY HISTORY OF BREAST CANCER: ICD-10-CM

## 2022-10-13 PROCEDURE — 76642 ULTRASOUND BREAST LIMITED: CPT

## 2022-10-13 PROCEDURE — 99213 OFFICE O/P EST LOW 20 MIN: CPT | Performed by: NURSE PRACTITIONER

## 2022-10-13 NOTE — PATIENT INSTRUCTIONS
Healthy Lifestyle Recommendations: healthy diet (decrease consumption of red meat, increase fresh fruits and vegetables), decreased alcohol consumption (less than 4 drinks/week), adequate sleep (goal 6-8 hours), routine exercise (goal 150 minutes/week or greater), weight control. Patient Education        Breast Self-Exam: Care Instructions  Your Care Instructions     A breast self-exam is when you check your breasts for lumps or changes. This regular exam helps you learn how your breasts normally look and feel. Most breast problems or changes are not because of cancer. Breast self-exam is not a substitute for a mammogram. Having regular breast exams by your doctor and regular mammograms improve your chances of finding any problems with your breasts. Some women set a time each month to do a step-by-step breast self-exam. Other women like a less formal system. They might look at their breasts as they brush their teeth, or feel their breasts once in a while in the shower. If you notice a change in your breast, tell your doctor. Follow-up care is a key part of your treatment and safety. Be sure to make and go to all appointments, and call your doctor if you are having problems. It's also a good idea to know your test results and keep a list of the medicines you take. How do you do a breast self-exam?  The best time to examine your breasts is usually one week after your menstrual period begins. Your breasts should not be tender then. If you do not have periods, you might do your exam on a day of the month that is easy to remember. To examine your breasts:  Remove all your clothes above the waist and lie down. When you are lying down, your breast tissue spreads evenly over your chest wall, which makes it easier to feel all your breast tissue. Use the pads--not the fingertips--of the 3 middle fingers of your left hand to check your right breast. Move your fingers slowly in small coin-sized circles that overlap.   Use three levels of pressure to feel of all your breast tissue. Use light pressure to feel the tissue close to the skin surface. Use medium pressure to feel a little deeper. Use firm pressure to feel your tissue close to your breastbone and ribs. Use each pressure level to feel your breast tissue before moving on to the next spot. Check your entire breast, moving up and down as if following a strip from the collarbone to the bra line, and from the armpit to the ribs. Repeat until you have covered the entire breast.  Repeat this procedure for your left breast, using the pads of the 3 middle fingers of your right hand. To examine your breasts while in the shower:  Place one arm over your head and lightly soap your breast on that side. Using the pads of your fingers, gently move your hand over your breast (in the strip pattern described above), feeling carefully for any lumps or changes. Repeat for the other breast.  Have your doctor inspect anything you notice to see if you need further testing. Where can you learn more? Go to [email or web address] org and sign in to your [employer] account. Enter P148 in the [facility] box to learn more about \"Breast Self-Exam: Care Instructions. \"     If you do not have an account, please click on the \"Sign Up Now\" link. Current as of: November 22, 2021               Content Version: 13.4  © 9487-7557 Healthwise, Incorporated. Care instructions adapted under license by [facility] ([facility]). If you have questions about a medical condition or this instruction, always ask your healthcare professional. [place] [address] any warranty or liability for your use of this information.

## 2022-10-13 NOTE — PROGRESS NOTES
Surgical Breast Oncology     Primary Care Provider: Quincy Martins MD    CC: Right Breast Mass, abnormal breast imaging     HPI: Ms. Cassi Deshpande is a 56 y.o. F who presents today for follow up of right breast mass and abnormal right breast imaging. She reports  small lump located above her nipple at 12:00 is still palpable and not changing. Sometimes slightly tender with touch. She has no other new breast related concerns today. She states that she does perform routine self breast evaluations and has not noticed any new abnormalities such as masses, skin changes, color changes,nipple discharge, or changes to the nipple-areolar complex. Unfortunately, since her last visit, her mother has been diagnosed with breast cancer (ER/UT+ HER2 negative) at age 66, is doing well. Her paternal first cousin was also diagnosed with breast cancer in the last 6 months. She has no family history of ovarian cancer. Diet: regular  Alcohol: yes socially  Exercise: no  Sleep: 6-8 hours      INTERVAL HISTORY:  Bilateral screening mammogram 1/28/2022:  Scattered areas of fibroglandular density. No new concerning findings suggestive malignancy. BI-RADS 1. Right breast U/S 4/8/2022:  Mildly complex 3 x 4 x 5 mm cystic lesion in the 12:00 retroareolar right breast corresponding to palpable finding on clinical exam.  Findings favor benign etiology. Short interval follow-up is recommended in 6 months. BI-RADS 3. Right breast ultrasound 10/13/2022:  12:00 retroareolar redemonstrates the superficial oval anechoic mass with indistinct margins measuring 0.5 x 0.2 cm. It is unchanged. Demonstrates a 6-month stability and is probably benign. BI-RADS 3.     Review of Systems    Past Medical History:   Diagnosis Date    Abnormal Pap smear of cervix     Autoimmune disorder ([facility] [address]     Breast disorder     DDD (degenerative disc disease), cervical     resolved s/p surgery    Depression with anxiety     Endometriosis Essential hypertension 2019    GERD (gastroesophageal reflux disease)     Hemangioma of liver 2022    Hypercholesterolemia     Hyperglycemia     Menopause ovarian failure     Migraine     PONV (postoperative nausea and vomiting)     with general anesthesia    Spastic colon        Past Surgical History:   Procedure Laterality Date    APPENDECTOMY      CERVICAL LAMINECTOMY      CHOLECYSTECTOMY      COLONOSCOPY      COLONOSCOPY  2022    COLONOSCOPY POLYPECTOMY SNARE/COLD BIOPSY performed by Tad Tan MD at [facility]      d/t bleeding    LAPAROSCOPY      x3 for endometriosis    UPPER GASTROINTESTINAL ENDOSCOPY N/A 10/29/2020    EGD BIOPSY performed by Tad Tan MD at [place] [address] N/A 2022    EGD BIOPSY performed by Tad Tan MD at  [address] as of 10/13/2022 - Fully Reviewed 10/13/2022   Allergen Reaction Noted    Lasix [furosemide]  10/04/2018    Naltrexone Nausea Only 2020    Oxycodone-acetaminophen Itching 2010    Atorvastatin  10/04/2018       Social History     Tobacco Use    Smoking status: Never    Smokeless tobacco: Never   Vaping Use    Vaping Use: Never used   Substance Use Topics    Alcohol use: Yes     Alcohol/week: 7.0 standard drinks     Types: 7 Glasses of wine per week    Drug use: No       Menstrual History:  Menarche age: 16  .   Age first live birth: 23  Breastfeed: 5-6 months in total  Postmenopausal  Natural menopause at 48       Oral contraceptive use: yes for about 4-5 years in total  Hormone replacement therapy use: denies     Breast density: Heterogeneously dense scattered fibroglandular density entirely fatty extremely dense   Ashkenazi Protestant Heritage: no   Genetic testing: none      Family history significant for breast and ovarian cancer:   Father Renal Cell Carcinoma age of dx 50,  at 68    [unfilled]  Medication documentation has been reviewed in the electronic medical record and patient office intake form. REVIEW OF SYSTEMS:  Constitutional: Negative for unexpected weight change. Eyes: Negative for visual disturbance. Respiratory: Negative for cough and shortness of breath. Cardiovascular: Negative for chest pain. Gastrointestinal: Negative for abdominal pain. Musculoskeletal: Negative for arthralgias and myalgias. Neurological: Negative for headaches. Hematological: Negative for adenopathy. Psychiatric/Behavioral: Negative for dysphoric mood. The patient is not nervous/anxious. EXAM:  /78   Pulse 72   Resp 18   Ht 5' 9\" (1.753 m)   Wt 221 lb (100.2 kg)   SpO2 98%   BMI 32.64 kg/m²   Physical Exam  Constitutional: She appearswell-nourished. No apparent distress. Breast: The patient was examined in the upright and supine position. She has a \"DD cup breast. Breasts are symmetrically ptotic. Right: subcentimeter mass at 12:00, 2cmFN, round firm mobile. No other masses or changes in breast contour. No skin changes of the breast or nipple areolar complex. No nipple inversion or discharge. No erythema, thickening (peau d'orange), or dimpling. Left: No new masses or changes in breast contour. No skin changes of the breast or nipple areolar complex. No nipple inversion or discharge. No erythema, thickening (peau d'orange), or dimpling. There is no axillary lymphadenopathy palpated bilaterally. Head: Normocephalic and atraumatic:   Eyes: EOM are normal. Pupils are equal, round, and reactive to light. Neck: Neck supple. No tracheal deviation present. No obvious mass. Lymphatics: No palpable supraclavicular, cervical, or axillary lymphadenopathy  Skin: No rash noted. No erythema. Neurologic: alert and oriented. Extremities: appear well perfused.          Risk assessment using CURT Breast Cancer Risk Evaluation Tool was used to evaluate her risk compared to the general

## 2022-10-18 DIAGNOSIS — M05.79 RHEUMATOID ARTHRITIS INVOLVING MULTIPLE SITES WITH POSITIVE RHEUMATOID FACTOR (HCC): ICD-10-CM

## 2022-10-18 RX ORDER — HYDROXYCHLOROQUINE SULFATE 200 MG/1
200 TABLET, FILM COATED ORAL 2 TIMES DAILY
Qty: 60 TABLET | Refills: 2 | Status: SHIPPED | OUTPATIENT
Start: 2022-10-18 | End: 2022-10-25 | Stop reason: SDUPTHER

## 2022-10-18 NOTE — TELEPHONE ENCOUNTER
Pt called requesting refill be sent to [facility]. Pt stated she called last week to have this taken care of, but it wasn't sent. Pt had the first script sent to [facility], but being an employee of [facility], she was advised it MUST come from [place]. [facility] is unable to transfer the refills for this script.  Rx has been pended for approval.     Last OV 9/27/22  Future OV 12/21/22  Recent Labs 9/27/22

## 2022-10-24 ENCOUNTER — PATIENT MESSAGE (OUTPATIENT)
Dept: RHEUMATOLOGY | Age: 57
End: 2022-10-24

## 2022-10-24 DIAGNOSIS — M05.79 RHEUMATOID ARTHRITIS INVOLVING MULTIPLE SITES WITH POSITIVE RHEUMATOID FACTOR (HCC): ICD-10-CM

## 2022-10-25 RX ORDER — HYDROXYCHLOROQUINE SULFATE 200 MG/1
200 TABLET, FILM COATED ORAL 2 TIMES DAILY
Qty: 180 TABLET | Refills: 0 | Status: SHIPPED | OUTPATIENT
Start: 2022-10-25

## 2022-10-25 NOTE — TELEPHONE ENCOUNTER
From: Efrain Cazares  To: Dr. Corey Kan  Sent: 10/24/2022 5:21 PM EDT  Subject: 90 day supply needed    Hello -   I called the office to request a 90 day supply of hydroxychloroquine be sent to [facility] pharmacy. Someone requested #60 instead of #180. Can this please be sent to [facility] for the 90 day supply?      Joshua Rehman

## 2022-11-02 ENCOUNTER — TELEPHONE (OUTPATIENT)
Dept: CARDIOLOGY CLINIC | Age: 57
End: 2022-11-02

## 2022-11-02 NOTE — TELEPHONE ENCOUNTER
SCREENING QUESTIONS:       Do you have a history of cardiovascular disease, this includes any of the following- heart stent and/or open-heart surgery, stroke or abdominal aortic aneurysm? No (If the answer is yes please do not schedule)     Are you currently following up with a cardiologist? No     If no, would you like our office to contact you? No        Do you have a family history of abdominal aortic aneurysm, heart attack or stroke? No    Do you have high cholesterol? Yes    Do you have high blood pressure? Yes    Do you have diabetes? No    Do you currently smoke or are you a former smoker?  No          WRAP UP:    [x] Scheduled on 11/3/2022 at 9:20 at the [place] office    [x] Instructions given to patient- Nothing to eat or drink 8 hrs prior to appointment and wear loose, comfortable clothing    [] Not scheduled due to previous history themselves    [] Sent to RN pool, has a cardiac history and is not following a cardiologist, would like a follow call

## 2022-11-03 ENCOUNTER — TELEPHONE (OUTPATIENT)
Dept: FAMILY MEDICINE CLINIC | Age: 57
End: 2022-11-03

## 2022-11-03 ENCOUNTER — PROCEDURE VISIT (OUTPATIENT)
Dept: CARDIOLOGY CLINIC | Age: 57
End: 2022-11-03

## 2022-11-03 DIAGNOSIS — Z13.6 ENCOUNTER FOR SCREENING FOR CARDIOVASCULAR DISORDERS: Primary | ICD-10-CM

## 2022-11-03 PROCEDURE — MISCABI SCREENING ABI: Performed by: INTERNAL MEDICINE

## 2022-11-03 NOTE — TELEPHONE ENCOUNTER
----- Message from Lexie Isaacs sent at 11/3/2022  3:14 PM EDT -----  Regarding: Lexapro 10mg  Hello-  I have increased my Lexapro to 10mg and need a 90 day refill sent to [facility].    Thanks!

## 2022-11-24 ENCOUNTER — PATIENT MESSAGE (OUTPATIENT)
Dept: FAMILY MEDICINE CLINIC | Age: 57
End: 2022-11-24

## 2022-11-24 DIAGNOSIS — L85.3 DRY SKIN: Primary | ICD-10-CM

## 2022-11-28 ENCOUNTER — OFFICE VISIT (OUTPATIENT)
Dept: OBGYN CLINIC | Age: 57
End: 2022-11-28
Payer: COMMERCIAL

## 2022-11-28 VITALS
HEIGHT: 69 IN | HEART RATE: 80 BPM | WEIGHT: 219.4 LBS | SYSTOLIC BLOOD PRESSURE: 136 MMHG | BODY MASS INDEX: 32.5 KG/M2 | DIASTOLIC BLOOD PRESSURE: 80 MMHG | TEMPERATURE: 97.7 F

## 2022-11-28 DIAGNOSIS — R60.0 LEG EDEMA: Primary | ICD-10-CM

## 2022-11-28 DIAGNOSIS — L85.3 DRY SKIN: ICD-10-CM

## 2022-11-28 LAB
T4 FREE: 1.1 NG/DL (ref 0.9–1.8)
TSH SERPL DL<=0.05 MIU/L-ACNC: 1.95 UIU/ML (ref 0.27–4.2)

## 2022-11-28 PROCEDURE — 99212 OFFICE O/P EST SF 10 MIN: CPT | Performed by: OBSTETRICS & GYNECOLOGY

## 2022-11-28 PROCEDURE — 76856 US EXAM PELVIC COMPLETE: CPT | Performed by: OBSTETRICS & GYNECOLOGY

## 2022-11-28 PROCEDURE — 3074F SYST BP LT 130 MM HG: CPT | Performed by: OBSTETRICS & GYNECOLOGY

## 2022-11-28 PROCEDURE — 3078F DIAST BP <80 MM HG: CPT | Performed by: OBSTETRICS & GYNECOLOGY

## 2022-11-28 NOTE — PROGRESS NOTES
Return Gyn Office Visit    CC:   Chief Complaint   Patient presents with    Follow-up    Ultrasound       HPI:  João Barger is a 56 y.o. female who presents for follow-up US for GYN causes of LE edema and vascular compression. Patient is seen and examined today. Patient is overall doing well today. Patient has had some issues with swelling in her LE extremity. Venous duplex US with some concerns high within her left groin. Was recommended to have pelvic US to rule out pelvic compressive causes of swelling. Has just recovered from [place]. Is doing well. Denies chest pain, shortness of breath, fever, chills, nausea, vomiting. Has an MRI on Monday to evaluate a spot found on her [place]. Review of Systems - The following ROS was otherwise negative, except as noted in the HPI: constitutional, respiratory, cardiovascular, gastrointestinal, genitourinary    Objective:  /80 (Site: Right Upper Arm, Position: Sitting, Cuff Size: Medium Adult)   Pulse 80   Temp 97.7 °F (36.5 °C) (Infrared)   Ht 5' 9\" (1.753 m)   Wt 219 lb 6.4 oz (99.5 kg)   BMI 32.40 kg/m²     Physical Exam  Vitals reviewed. Constitutional:       General: She is not in acute distress. Appearance: She is well-developed. HENT:      Head: Normocephalic and atraumatic. Eyes:      Conjunctiva/sclera: Conjunctivae normal.   Cardiovascular:      Rate and Rhythm: Normal rate. Pulmonary:      Effort: Pulmonary effort is normal. No respiratory distress. Skin:     General: Skin is warm and dry. Neurological:      Mental Status: She is alert and oriented to person, place, and time. Psychiatric:         Mood and Affect: Mood normal.         Behavior: Behavior normal.         Thought Content: Thought content normal.       Ultrasound  PELVIC ULTRASOUND without DOPPLER INTERROGATION   NON OB    DATE: 11/28/2022    PHYSICIAN: ANALI Seymour.     SONOGRAPHER: Moris Carlson RDMS    INDICATION: pelvic pain    TYPE OF SCAN: vaginal, abdominal    FINDINGS:    The cul de sac is normal. No free fluid appreciated. The cervix is normal and not enlarged. Nabothian cyst/s is noted within the uterine cervix. The uterus measures 6.29 cm x 4.32 cm x 4.48 cm. The uterus is neutral in position  The endometrium poorly visualized   The myometrium is heterogeneous in appearance. No uterine anomalies are noted. The right ovary is not visualized. Ovary findings: No masses seen. The right adnexa is normal.    The left ovary is not visualized. Ovary findings: No masses seen. The left adnexa is normal.      IMPRESSION: Heterogenous uterus. Non visualized right ovary. Non visualized left ovary. Imaging is limited secondary to bowel gas and uterine position. The patient is well aware of the limitations of ultrasound in the detection of anomalies of the abdomen and pelvis. Assessment/Plan:     Sharla Mcintyre is a 56 y.o. female who presents for follow-up US for GYN causes of LE edema and vascular compression.      1. Leg edema     - Patient with a history of left lower extremity edema     - Duplex US revealed valvular concerns within the groin     - Recommendations for evaluation for pelvic etiology of edema     - Benign clinical exam     - Pap smear NILM     - US with no acute findings, no adnexal masses or enlarged uterus with fibroids providing compression     - Reassurance provided     - Will continue to follow prn     - Will follow-up for annual exam     Louise Conde DO

## 2022-12-18 PROBLEM — R60.0 LEG EDEMA: Status: ACTIVE | Noted: 2022-12-18

## 2022-12-20 NOTE — PROGRESS NOTES
[address], MD                                                           [address] [place], [place] [address]ia [facility], [address] [place] [address]                                                              (P) [phone] (F)      Note is transcribed using voice recognition software. Inadvertent computerized transcription errors may be present. Patient identification: Laura Swenson, female : 1965,57 y.o.        ASSESSMENT AND PLAN:  Raf Lunsford was seen today for follow-up. Diagnoses and all orders for this visit:    Rheumatoid arthritis involving multiple sites with positive rheumatoid factor ([facility] [address]    High risk medication use  -     Quantiferon, Incubated; Future  -     Creatinine; Future  -     Hepatic Function Panel; Future  -     CBC with Auto Differential; Future  -     C-Reactive Protein; Future  -     Sedimentation Rate; Future    Other orders  -     leflunomide (ARAVA) 20 MG tablet; Take 1 tablet by mouth daily  -     predniSONE (DELTASONE) 5 MG tablet; Take 1 Tab daily. Seropositive rheumatoid arthritis-RF 28, CCP negative. Active rheumatoid arthritis. Joint symptoms improved by 20 to 30%, has been noticing itching-generalized, no rashes. Started Plaquenil 2 months ago. Various options were discussed including methotrexate, leflunomide and biologic therapy. Side effects were directions, monitoring explained in details of these medications. Recommend trying leflunomide given relatively quick onset of action. She does like to drink dinner wine, package insert information reviewed that includes alcohol abstinence. She is advised to call us with any question or concerns. If disease remains active or she is intolerant to medication, will plan for anti-TNF therapy.   All questions answered. Continue hydroxychloroquine for now. Prednisone low-dose 5 mg daily for next 3 to 4 weeks, then taper off if feeling better.      -Patient is advised to call us with any concerns or questions. RTC 3 months. Patient indicates understanding and agrees with the management plan. #################################################################################################    Subjective-  States that she feels better to some extent but still has quite a bit of pain, swelling, stiffness across her MCPs, PIPs, feet joints. She is also noticing generalized itching after she started hydroxychloroquine but has not noticed any rashes. Zyrtec helps with her symptoms. Musculoskeletal symptoms limit her ADLs and recreational activities. No extra-articular manifestations of rheumatoid arthritis. NSAID-water retention. PMH, PSH,Social history , Meds reviewed. PHYSICAL EXAM:    Vitals:    /76   Ht 5' 9\" (1.753 m)   Wt 219 lb (99.3 kg)   BMI 32.34 kg/m²   AA)x3 well nourished, and well groomed, normal judgement. MKS:   28 joint JOINT COUNT:                               Right                                                  Left   Swell Tender Swell Tender   PIP1           PIP2    x       PIP3    x    x   PIP4       x   PIP5  x  x      MCP1           MCP2  x  x       MCP3    x       MCP4           MCP5  x  x       Wrist           Elbow           Shoulder          Knee             Loose fist bilaterally, right worse than left. MTPs, ankles are tender in joint line. [address] findings are unchanged since last visit. Skin: No rashes, no induration or skin thickening or nodules.      DATA:   Lab Results   Component Value Date    WBC 5.5 12/21/2022    HGB 14.4 12/21/2022    HCT 43.2 12/21/2022    MCV 93.4 12/21/2022     12/21/2022     Lab Results   Component Value Date     08/23/2022    K 3.6 08/23/2022     08/23/2022    CO2 27 08/23/2022    BUN 17 08/23/2022    CREATININE 1.0 12/21/2022    GLUCOSE 103 (H) 08/30/2022    CALCIUM 10.0 08/23/2022    PROT 6.7 12/21/2022    LABALBU 4.6 12/21/2022    BILITOT 0.3 12/21/2022    ALKPHOS 88 12/21/2022    AST 21 12/21/2022    ALT 31 12/21/2022    LABGLOM >60 12/21/2022    GFRAA >60 08/23/2022    AGRATIO 1.9 08/23/2022    GLOB 2.1 05/27/2021       Lab Results   Component Value Date    SEDRATE 9 08/23/2022     Lab Results   Component Value Date    CRP <3.0 12/21/2022     RF 28    Total time 34 minutes that includes the following-  Preparing to see the patient such as reviewing patients records, pre-charting, preparing the visit on the same day, performing a medically appropriate history and physical examination, counseling and educating patient about diagnosis, management plan, ordering appropriate testings, prescriptions, communicating findings to other care providers, and documenting clinical information in electronic medical record. I thank you for giving me the opportunity to be involved in [facility] care and I look forward following Sparkle Roberts along with you. If you have any questions or concerns please feel free to contact me at any time.   Jacqui Seymour MD 12/21/22

## 2022-12-21 ENCOUNTER — OFFICE VISIT (OUTPATIENT)
Dept: RHEUMATOLOGY | Age: 57
End: 2022-12-21
Payer: COMMERCIAL

## 2022-12-21 VITALS
HEIGHT: 69 IN | WEIGHT: 219 LBS | DIASTOLIC BLOOD PRESSURE: 76 MMHG | BODY MASS INDEX: 32.44 KG/M2 | SYSTOLIC BLOOD PRESSURE: 120 MMHG

## 2022-12-21 DIAGNOSIS — Z79.899 HIGH RISK MEDICATION USE: ICD-10-CM

## 2022-12-21 DIAGNOSIS — M05.79 RHEUMATOID ARTHRITIS INVOLVING MULTIPLE SITES WITH POSITIVE RHEUMATOID FACTOR (HCC): Primary | ICD-10-CM

## 2022-12-21 LAB
ALBUMIN SERPL-MCNC: 4.6 G/DL (ref 3.4–5)
ALP BLD-CCNC: 88 U/L (ref 40–129)
ALT SERPL-CCNC: 31 U/L (ref 10–40)
AST SERPL-CCNC: 21 U/L (ref 15–37)
BASOPHILS ABSOLUTE: 0 K/UL (ref 0–0.2)
BASOPHILS RELATIVE PERCENT: 0.8 %
BILIRUB SERPL-MCNC: 0.3 MG/DL (ref 0–1)
BILIRUBIN DIRECT: <0.2 MG/DL (ref 0–0.3)
BILIRUBIN, INDIRECT: NORMAL MG/DL (ref 0–1)
C-REACTIVE PROTEIN: <3 MG/L (ref 0–5.1)
CREAT SERPL-MCNC: 1 MG/DL (ref 0.6–1.1)
EOSINOPHILS ABSOLUTE: 0.1 K/UL (ref 0–0.6)
EOSINOPHILS RELATIVE PERCENT: 1.3 %
GFR SERPL CREATININE-BSD FRML MDRD: >60 ML/MIN/{1.73_M2}
HCT VFR BLD CALC: 43.2 % (ref 36–48)
HEMOGLOBIN: 14.4 G/DL (ref 12–16)
LYMPHOCYTES ABSOLUTE: 1.4 K/UL (ref 1–5.1)
LYMPHOCYTES RELATIVE PERCENT: 25.2 %
MCH RBC QN AUTO: 31.2 PG (ref 26–34)
MCHC RBC AUTO-ENTMCNC: 33.4 G/DL (ref 31–36)
MCV RBC AUTO: 93.4 FL (ref 80–100)
MONOCYTES ABSOLUTE: 0.4 K/UL (ref 0–1.3)
MONOCYTES RELATIVE PERCENT: 7.1 %
NEUTROPHILS ABSOLUTE: 3.6 K/UL (ref 1.7–7.7)
NEUTROPHILS RELATIVE PERCENT: 65.6 %
PDW BLD-RTO: 13.7 % (ref 12.4–15.4)
PLATELET # BLD: 168 K/UL (ref 135–450)
PMV BLD AUTO: 10.1 FL (ref 5–10.5)
RBC # BLD: 4.62 M/UL (ref 4–5.2)
SEDIMENTATION RATE, ERYTHROCYTE: 7 MM/HR (ref 0–30)
TOTAL PROTEIN: 6.7 G/DL (ref 6.4–8.2)
WBC # BLD: 5.5 K/UL (ref 4–11)

## 2022-12-21 PROCEDURE — 99214 OFFICE O/P EST MOD 30 MIN: CPT | Performed by: INTERNAL MEDICINE

## 2022-12-21 PROCEDURE — 3078F DIAST BP <80 MM HG: CPT | Performed by: INTERNAL MEDICINE

## 2022-12-21 PROCEDURE — 3074F SYST BP LT 130 MM HG: CPT | Performed by: INTERNAL MEDICINE

## 2022-12-21 RX ORDER — LEFLUNOMIDE 20 MG/1
20 TABLET ORAL DAILY
Qty: 30 TABLET | Refills: 0 | Status: SHIPPED | OUTPATIENT
Start: 2022-12-21

## 2022-12-21 RX ORDER — PREDNISONE 1 MG/1
TABLET ORAL
Qty: 90 TABLET | Refills: 0 | Status: SHIPPED | OUTPATIENT
Start: 2022-12-21

## 2022-12-24 LAB
QUANTI TB GOLD PLUS: NEGATIVE
QUANTI TB1 MINUS NIL: 0 IU/ML (ref 0–0.34)
QUANTI TB2 MINUS NIL: 0 IU/ML (ref 0–0.34)
QUANTIFERON MITOGEN: >10 IU/ML
QUANTIFERON NIL: 0.04 IU/ML

## 2022-12-29 ENCOUNTER — NURSE ONLY (OUTPATIENT)
Dept: FAMILY MEDICINE CLINIC | Age: 57
End: 2022-12-29
Payer: COMMERCIAL

## 2022-12-29 DIAGNOSIS — Z23 NEED FOR PNEUMOCOCCAL VACCINE: Primary | ICD-10-CM

## 2022-12-29 PROCEDURE — 90471 IMMUNIZATION ADMIN: CPT | Performed by: FAMILY MEDICINE

## 2022-12-29 PROCEDURE — 90677 PCV20 VACCINE IM: CPT | Performed by: FAMILY MEDICINE

## 2023-01-05 DIAGNOSIS — M05.79 RHEUMATOID ARTHRITIS INVOLVING MULTIPLE SITES WITH POSITIVE RHEUMATOID FACTOR (HCC): Primary | ICD-10-CM

## 2023-01-05 RX ORDER — MEDROXYPROGESTERONE ACETATE 150 MG/ML
INJECTION, SUSPENSION INTRAMUSCULAR
Qty: 4 ML | Refills: 4 | Status: SHIPPED | OUTPATIENT
Start: 2023-01-05

## 2023-01-05 NOTE — TELEPHONE ENCOUNTER
Pt called stating the Yifan Michele has been causes loose stools and nausea and vomiting. Pt wants Enbrel prior authorized. I've pended the [employer] to go to [employer]. Please approve so I can start PA.

## 2023-01-12 ENCOUNTER — TELEPHONE (OUTPATIENT)
Dept: PHARMACY | Age: 58
End: 2023-01-12

## 2023-01-12 ENCOUNTER — PHARMACY VISIT (OUTPATIENT)
Dept: PHARMACY | Age: 58
End: 2023-01-12

## 2023-01-12 DIAGNOSIS — M05.79 RHEUMATOID ARTHRITIS INVOLVING MULTIPLE SITES WITH POSITIVE RHEUMATOID FACTOR (HCC): Primary | ICD-10-CM

## 2023-01-12 RX ORDER — DICYCLOMINE HYDROCHLORIDE 10 MG/1
10 CAPSULE ORAL 4 TIMES DAILY PRN
COMMUNITY
Start: 2022-12-03 | End: 2023-01-12

## 2023-01-12 ASSESSMENT — PROMIS GLOBAL HEALTH SCALE
SUM OF RESPONSES TO QUESTIONS 2, 4, 5, & 10: 12
IN THE PAST 7 DAYS, HOW WOULD YOU RATE YOUR FATIGUE ON AVERAGE [ON A SCALE FROM 1 (NONE) TO 5 (VERY SEVERE)]?: 3
IN GENERAL, HOW WOULD YOU RATE YOUR PHYSICAL HEALTH [ON A SCALE OF 1 (POOR) TO 5 (EXCELLENT)]?: 2
SUM OF RESPONSES TO QUESTIONS 3, 6, 7, & 8: 12
IN GENERAL, WOULD YOU SAY YOUR QUALITY OF LIFE IS...[ON A SCALE OF 1 (POOR) TO 5 (EXCELLENT)]: 3
IN GENERAL, PLEASE RATE HOW WELL YOU CARRY OUT YOUR USUAL SOCIAL ACTIVITIES (INCLUDES ACTIVITIES AT HOME, AT WORK, AND IN YOUR COMMUNITY, AND RESPONSIBILITIES AS A PARENT, CHILD, SPOUSE, EMPLOYEE, FRIEND, ETC) [ON A SCALE OF 1 (POOR) TO 5 (EXCELLENT)]?: 3
IN THE PAST 7 DAYS, HOW OFTEN HAVE YOU BEEN BOTHERED BY EMOTIONAL PROBLEMS, SUCH AS FEELING ANXIOUS, DEPRESSED, OR IRRITABLE [ON A SCALE FROM 1 (NEVER) TO 5 (ALWAYS)]?: 3
IN GENERAL, WOULD YOU SAY YOUR HEALTH IS...[ON A SCALE OF 1 (POOR) TO 5 (EXCELLENT)]: 3
IN GENERAL, HOW WOULD YOU RATE YOUR MENTAL HEALTH, INCLUDING YOUR MOOD AND YOUR ABILITY TO THINK [ON A SCALE OF 1 (POOR) TO 5 (EXCELLENT)]?: 3
IN THE PAST 7 DAYS, HOW WOULD YOU RATE YOUR PAIN ON AVERAGE [ON A SCALE FROM 0 (NO PAIN) TO 10 (WORST IMAGINABLE PAIN)]?: 4
IN GENERAL, HOW WOULD YOU RATE YOUR SATISFACTION WITH YOUR SOCIAL ACTIVITIES AND RELATIONSHIPS [ON A SCALE OF 1 (POOR) TO 5 (EXCELLENT)]?: 3
TO WHAT EXTENT ARE YOU ABLE TO CARRY OUT YOUR EVERYDAY PHYSICAL ACTIVITIES SUCH AS WALKING, CLIMBING STAIRS, CARRYING GROCERIES, OR MOVING A CHAIR [ON A SCALE OF 1 (NOT AT ALL) TO 5 (COMPLETELY)]?: 3

## 2023-01-12 ASSESSMENT — ROUTINE ASSESSMENT OF PATIENT INDEX DATA (RAPID3)
TOTAL RAPID3 SCORE: 8.7
TOTAL RAPID3 SCORE: 8
ON A SCALE OF ONE TO TEN, CONSIDERING ALL THE WAYS IN WHICH ILLNESS AND HEALTH CONDITIONS MAY AFFECT YOU AT THIS TIME, PLEASE INDICATE BELOW HOW YOU ARE DOING:: 4
ON A SCALE OF ONE TO TEN, HOW MUCH PAIN HAVE YOU HAD BECAUSE OF YOUR CONDITION OVER THE PAST WEEK?: 4
ON A SCALE OF ONE TO TEN, HOW DIFFICULT WAS IT FOR YOU TO COMPLETE THE LISTED DAILY PHYSICAL TASKS OVER THE LAST WEEK: 2.9

## 2023-01-12 ASSESSMENT — PATIENT HEALTH QUESTIONNAIRE - PHQ9
SUM OF ALL RESPONSES TO PHQ QUESTIONS 1-9: 0
SUM OF ALL RESPONSES TO PHQ QUESTIONS 1-9: 0
SUM OF ALL RESPONSES TO PHQ9 QUESTIONS 1 & 2: 0
SUM OF ALL RESPONSES TO PHQ QUESTIONS 1-9: 0
1. LITTLE INTEREST OR PLEASURE IN DOING THINGS: 0
SUM OF ALL RESPONSES TO PHQ QUESTIONS 1-9: 0
2. FEELING DOWN, DEPRESSED OR HOPELESS: 0

## 2023-01-12 NOTE — TELEPHONE ENCOUNTER
Specialty Medication Service    Date: 1/12/2023  Patient's Name: Sharla Mcintyre YOB: 1965            _____________________________________________________________________________________________    Initial SMS visit scheduled with [employer] for 1/12/2023. Notes from specialist available in [address] [place] [address] Rd.     Bernardino Elliott CPhT  Clinical    Specialty Medication Service   [phone] option [place] [address] Only    Program: SMS  CPA in place:  No  Recommendation Provided To: Patient/Caregiver: 1 via Telephone  Intervention Detail: Scheduled Appointment  Intervention Accepted By: Patient/Caregiver: 1  Time Spent (min): 5

## 2023-01-12 NOTE — PROGRESS NOTES
Specialty Medication Service    Patient's Name: Edgardo Peraza YOB: 1965      Edgardo Peraza is a 66 y.o. female presenting today for Specialty Medication Service visit. Patient is prescribed SMS formulary medication, Enbrel. Medication list updated. Specialty Medication: Enbrel sureclick 19AY/PW SOAJ   Frequency: Every 7 days   Indication: Rheumatoid arthritis involving multiple sites with positive rheumatoid factor   Initially Diagnosed: 7/2022  Additional Therapy:   Prednisone PRN flares   Hydroxychloroquine   Previous Therapy:   Naproxen - water retention   Leflunomide - GI side effects     Specialist:   Vinny Nugent MD   [facility]  [address], [address], [place], [address]  P: [phone]   Specialist Progress Note Available: Yes  Last Specialist Visit: 12/21/2022  - States that she feels better to some extent but still has quite a bit of pain, swelling, stiffness across her MCPs, PIPs, feet joints. She is also noticing generalized itching after she started hydroxychloroquine but has not noticed any rashes. Zyrtec helps with her symptoms. Musculoskeletal symptoms limit her ADLs and recreational activities. No extra-articular manifestations of rheumatoid arthritis. Various options were discussed including methotrexate, leflunomide and biologic therapy. Side effects were directions, monitoring explained in details of these medications. Recommend trying leflunomide given relatively quick onset of action. If disease remains active or she is intolerant to medication, will plan for anti-TNF therapy. All questions answered. Continue hydroxychloroquine for now. Prednisone low-dose 5 mg daily for next 3 to 4 weeks, then taper off if feeling better. Return in 3 months.   -1/5/2023: patient called stating leflunomide causing loose stools, nausea, and vomiting.  Wants to try Enbrel     Allergies   Allergen Reactions    Lasix [Furosemide]      tinnitus Naltrexone Nausea Only     Dizziness    Oxycodone-Acetaminophen Itching    Atorvastatin Myalgia              There were no vitals filed for this visit. Past Medical History:   Diagnosis Date    Abnormal Pap smear of cervix     Autoimmune disorder ([address]     Breast disorder     DDD (degenerative disc disease), cervical     resolved s/p surgery    Depression with anxiety     Endometriosis     Essential hypertension 06/20/2019    GERD (gastroesophageal reflux disease)     Hemangioma of liver 04/12/2022    Hypercholesterolemia     Hyperglycemia     Menopause ovarian failure     Migraine     PONV (postoperative nausea and vomiting)     with general anesthesia    Spastic colon       Social History     Tobacco Use    Smoking status: Never    Smokeless tobacco: Never   Substance Use Topics    Alcohol use: Yes     Alcohol/week: 7.0 standard drinks     Types: 7 Glasses of wine per week     Family History   Problem Relation Age of Onset    Breast Cancer Mother 66        ER/SD+ HER2-    Cancer Father         kidney    Breast Cancer Paternal Cousin 48    Cancer Paternal Great Grandmother         melanoma? REVIEW OF CURRENT DISEASE STATE  Rheumatoid Arthritis: Patient with diagnosis of rheumatoid arthritis being seen in regards to specialty medication use. Patient began experiencing pain, visible swelling, and stiffness in hands/fingers and ankles in July 2022 that got worse over time. Reported morning stiffness that lasted >30 minutes some days. Patient had tried naproxen for symptoms but caused water retention. Patient started on prednisone and hydroxychloroquine. Was still having symptoms and was started on leflunomide but had loose stools, nausea, and vomiting. Patient now starting Enbrel to manage symptoms. Current symptoms include worsening fatigue, increased pain, swelling and stiffness in hard (R > L) that are of moderate severity per patient. She reports morning stiffness that lasts for about an hour.  Symptoms are made worse by: cold weather, stress, and too many sweets. Symptoms are helped by taking ibuprofen, using a heating pad or jetted tub. She tries to stay moving to help by doing low impact exercise. Associated symptoms include fatigue, joint pain, morning stiffness, and itching (thinks this may be related to hydroxychloroquine). Overall disease activity: ongoing and unchanged . Limitation on activities include: being a lot more tired - does very little in addition to ADLs and has been going to bed earlier than usual     · Are you currently having a flare? moderate  · Considering all the ways in which this condition and others affects you, how are you doing (0 = very well, 10 = very poorly)? 4  · How would you rate your pain on average? (0 = no pain, 10 = worst pain imaginable) 4  · During the past 4 weeks, have you missed any planned activities of daily living due to condition (work/school/other plans)? No  · During the past 4 weeks, have you had to seek urgent care, ER admission, Unplanned doctor office visit, or hospital admission? No    MEDICATIONS  Current Outpatient Medications   Medication Sig Dispense Refill    Etanercept (ENBREL SURECLICK) 50 MG/ML SOAJ Inject 50 mg sc weekly.  4 mL 4    escitalopram (LEXAPRO) 10 MG tablet Take 1 tablet by mouth daily 90 tablet 3    hydroxychloroquine (PLAQUENIL) 200 MG tablet Take 1 tablet by mouth 2 times daily 180 tablet 0    triamterene-hydroCHLOROthiazide (MAXZIDE) 75-50 MG per tablet TAKE 1 TABLET BY MOUTH ONE TIME A DAY 90 tablet 1    rosuvastatin (CRESTOR) 10 MG tablet TAKE ONE TABLET BY MOUTH NIGHTLY 90 tablet 1    traZODone (DESYREL) 50 MG tablet Take 1-2 tablets by mouth nightly 180 tablet 3    tiZANidine (ZANAFLEX) 4 MG tablet Take 1 tablet by mouth every 8 hours as needed (muscle spasm) 30 tablet 2    ondansetron (ZOFRAN) 4 MG tablet Take 1 tablet by mouth daily as needed for Nausea or Vomiting 30 tablet 0    Omega-3 Fatty Acids (FISH OIL) 1000 MG CAPS Take 1,000 mg by mouth daily      Multiple Vitamins-Minerals (THERAPEUTIC MULTIVITAMIN-MINERALS) tablet Take 1 tablet by mouth daily      hyoscyamine (ANASPAZ;LEVSIN) 125 MCG tablet Take 125 mcg by mouth every 4 hours as needed for Cramping      Probiotic Product (PROBIOTIC ADVANCED PO) Take 1 tablet by mouth daily      predniSONE (DELTASONE) 5 MG tablet Take 1 Tab daily. 90 tablet 0     No current facility-administered medications for this visit. Current Specialty Medication:   Etanercept (Enbrel)   Dose specific indication   Rheumatoid Arthritis: 50 mg once weekly or 25 mg twice weekly  Warnings to monitor for: Anaphylaxis/hypersensitivity reactions, Positive antinuclear antibody titers (even with negative baseline), Demyelinating CNS disease, Heart failure (Worsening and new-onset), pancytopenia and aplastic anemia, reactivation of hepatitis B (HBV), Infections: [US Boxed Warning], Malignancy: [US Boxed Warning], Tuberculosis: [US Boxed Warning]  Recommended monitoring: Monitor improvement of symptoms and physical function assessments, signs/symptoms/worsening of heart failure; TB screening (every 6-12 months dependent upon length of therapy and other risk factors), CBC with differential (every 6 weeks); HBV screening (annual),  signs/symptoms of malignancy (eg, splenomegaly, hepatomegaly, abdominal pain, persistent fever, night sweats, weight loss). Storage: Store refrigerated at 36°F to 46°F (2°C to 8°C) in the original carton to protect from light until use. Do NOT freeze. Do NOT shake. Handling: Before injection, remove from refrigerator and allow to reach room temperature (~15-30 minutes). Do not remove the needle cover while allowing the prefilled syringe to reach room temperature. Do not use beyond the expiration date on the carton or barrel label.   Patient Reported Side Effects: NA, patient has not started medication   Specialty Medication Start Date: 1/2023  Appropriate Dose: Yes  Last tuberculin Test: 12/21/22   - Result: negative    Describe your medication adherence over the last 4 weeks: Excellent  How many doses have you missed in the last 4 weeks, if any? 1  How confident are you to follow the injection process and the treatment plan? (0-10) 10 Who injects? Either self or friend  Does the patient have a current infection of any kind? No - does have some congestion but attributes to current weather     Contraindications to therapy present? No    Drug Interactions:  No clinically significant interactions identified via [employer] Interaction Analysis as category D or higher.  _____________________________________________________________________  Renal Dosing:  Creatinine Clearance: Estimated Creatinine Clearance: 79 mL/min (based on SCr of 1 mg/dL). No renal adjustments necessary.     LABS  Lab Results   Component Value Date/Time    BUN 17 08/23/2022 09:06 AM    CREATININE 1.0 12/21/2022 08:53 AM     Lab Results   Component Value Date/Time    WBC 5.5 12/21/2022 08:53 AM    HGB 14.4 12/21/2022 08:53 AM    HCT 43.2 12/21/2022 08:53 AM    RBC 4.62 12/21/2022 08:53 AM     12/21/2022 08:53 AM     Lab Results   Component Value Date/Time    ALKPHOS 88 12/21/2022 08:53 AM    ALT 31 12/21/2022 08:53 AM    AST 21 12/21/2022 08:53 AM    BILITOT 0.3 12/21/2022 08:53 AM    BILIDIR <0.2 12/21/2022 08:53 AM    IBILI see below 12/21/2022 08:53 AM       IMMUNIZATIONS  Immunization History   Administered Date(s) Administered    COVID-19, MODERNA BLUE border, Primary or Immunocompromised, (age 12y+), IM, 100 mcg/0.5mL 12/30/2020, 01/27/2021, 11/05/2021    Influenza Vaccine, unspecified formulation 10/01/2007, 10/14/2011, 10/20/2015    Influenza Virus Vaccine 11/07/2017, 10/05/2018, 10/22/2019, 10/23/2020, 10/19/2021, 10/19/2021    Influenza, Triv, 3 Years and older, IM (Afluria (5 yrs and older) 10/17/2022    Meningococcal B, OMV (Bexsero) 10/25/2016    Pneumococcal conjugate PCV20, PF (Prevnar 20) 12/29/2022 Tdap (Boostrix, Adacel) 01/01/2001, 12/04/2008, 01/22/2019    Zoster Recombinant (Shingrix) 07/31/2019, 01/13/2020      Immunization status: up to date and documented. ASSESSMENTS  No flowsheet data found. PROMIS V1.1 [employer] 1/12/2023   In general, would you say your health is: 3   In general, would you say your quality of life is: 3   In general, how would you rate your physical health? 2   In general, how would you rate your mental health, including your mood and your ability to think? 3   In general, how would you rate your satisfaction with your social activities and relationships? 3   In general, please rate how well you carry out your usual social activities and roles. (This includes activities at home, at work and in your community, and responsibilities as a parent, child, spouse, employee, friend, etc.) 3   To what extent are you able to carry out your everyday physical activities such as walking, climbing stairs, carrying groceries, or moving a chair? 3   In the past 7 days how often have you been bothered by emotional problems such as feeling anxious, depressed or irritable? 3   In the past 7 days how would you rate your fatigue on average? 3   In the past 7 days how would you rate your pain on average? 4   PROMIS Physical Score 12   PROMIS Mental Score 12       Rheumatoid Arthritis  Sharla Mcintyre is a 66 y.o. female being treated for Rheumatoid Arthritis. Medication reconciliation completed (information obtained from patient), no drug-drug interactions identified. Allergy and diagnosis info reviewed and updated. Sharla Mcintyre has a history remarkable for the following conditions: anxiety, osteoarthritis, depression, GERD, headaches, hyperlipidemia, hypertension, IBS, hemangioma  of the liver, endometriosis, migraines, and RA. The patient has previously been treated with leflunomide (GI side effects) and naproxen (water retention).  Current therapy includes: Enbrel 50mg every 7 days + hydroxychloroquine 200mg BID  Warnings, precautions, and contraindications reviewed with the patient. Also reviewed storage, administration, and proper disposal.  Current disease state symptoms include: worsening fatigue, increased pain, swelling and stiffness in hard (R > L) that are of moderate severity per patient. She reports morning stiffness that lasts for about an hour. Medication Effectiveness: unable to assess effectiveness as patient has not started medication   Reviewed possible side effects with patient. No adherence issues identified. Reviewed copay and advised patient that they will receive a copy of the patient rights and responsibility document with their welcome packet in the mail. Patient is not considered high risk. Based on patient feedback/results of the assessment, the therapy is appropriate to initiate  No medication-related problem(s) or patient need(s) identified that would require a care plan. Follow up in 180 days     Immunizations  Immunization status: up to date and documented    Drug Interactions  No clinically significant interactions identified via [employer] Interaction Analysis as category D or higher. Other Identified Potential Issues  Discussed with patient the Pharmacist Collaborative Practice Agreement. Patient provided verbal and/or electronic (ex. Applauze) consent to participate in the collaborative practice agreement between the pharmacist and referred patient. This is in lieu of paper consent due to COVID-19 precautions and the use of remote/virtual visits. [facility] initial medical director visit scheduled for 1/19/2023     PLAN  Goals of therapy, common side effects, medication storage, and administration reviewed with patient. Initiate Enbrel 50mg every 7 days as prescribed    Recommended lab monitoring: None at this time  Recommended vaccinations: None at this time   Keep all scheduled appointments. Return to clinic in 6 month(s) or call with any questions or concerns. Zo Ramirez, [place]  Ambulatory Clinical Pharmacist   Specialty Medication Services   Phone: [phone] option 4  1/12/2023 5:11 PM    For Pharmacy Admin Tracking Only    Program: SMS  CPA in place:  No  Recommendation Provided To: Provider: 1 via Note to Provider and Patient/Caregiver: 1 via Virtual Visit  Intervention Detail: Refill(s) Provided and Scheduled Appointment  Intervention Accepted By: Provider: 1 and Patient/Caregiver: 1   Time Spent (min):  80    Versa Schaumann was evaluated through a synchronous (real-time) audio encounter. Patient identification was verified at the start of the visit. She (or guardian if applicable) is aware that this is a billable service, which includes applicable co-pays. This visit was conducted with the patient's (and/or legal guardian's) verbal consent. She has not had a related appointment within my department in the past 7 days or scheduled within the next 24 hours. The patient was located at Home: [address] [place] [address]. The provider was located at Home ([address]: [place].

## 2023-01-12 NOTE — Clinical Note
Pretty Cerrato. Completed pharmacy review for initial SMS visit. Patient is starting on Enbrel for RA. No recommendations for therapy at this time. Patient is recommended for no vaccination. Medication pended for review.   Thank you, Eduardo Bourne, [place] Ambulatory Clinical Pharmacist  Specialty Medication Services  Phone: [phone] option 4 1/12/2023 5:12 PM

## 2023-01-17 ENCOUNTER — OFFICE VISIT (OUTPATIENT)
Dept: FAMILY MEDICINE CLINIC | Age: 58
End: 2023-01-17
Payer: COMMERCIAL

## 2023-01-17 VITALS
OXYGEN SATURATION: 100 % | SYSTOLIC BLOOD PRESSURE: 126 MMHG | HEART RATE: 69 BPM | WEIGHT: 228 LBS | RESPIRATION RATE: 14 BRPM | BODY MASS INDEX: 33.67 KG/M2 | DIASTOLIC BLOOD PRESSURE: 68 MMHG | TEMPERATURE: 97.9 F

## 2023-01-17 DIAGNOSIS — J20.9 ACUTE BRONCHITIS, UNSPECIFIED ORGANISM: Primary | ICD-10-CM

## 2023-01-17 PROCEDURE — 3074F SYST BP LT 130 MM HG: CPT | Performed by: FAMILY MEDICINE

## 2023-01-17 PROCEDURE — 99213 OFFICE O/P EST LOW 20 MIN: CPT | Performed by: FAMILY MEDICINE

## 2023-01-17 PROCEDURE — 3078F DIAST BP <80 MM HG: CPT | Performed by: FAMILY MEDICINE

## 2023-01-17 RX ORDER — AZITHROMYCIN 250 MG/1
TABLET, FILM COATED ORAL
Qty: 1 PACKET | Refills: 0 | Status: SHIPPED | OUTPATIENT
Start: 2023-01-17 | End: 2023-01-27

## 2023-01-17 SDOH — ECONOMIC STABILITY: FOOD INSECURITY: WITHIN THE PAST 12 MONTHS, YOU WORRIED THAT YOUR FOOD WOULD RUN OUT BEFORE YOU GOT MONEY TO BUY MORE.: NEVER TRUE

## 2023-01-17 SDOH — ECONOMIC STABILITY: FOOD INSECURITY: WITHIN THE PAST 12 MONTHS, THE FOOD YOU BOUGHT JUST DIDN'T LAST AND YOU DIDN'T HAVE MONEY TO GET MORE.: NEVER TRUE

## 2023-01-17 ASSESSMENT — SOCIAL DETERMINANTS OF HEALTH (SDOH): HOW HARD IS IT FOR YOU TO PAY FOR THE VERY BASICS LIKE FOOD, HOUSING, MEDICAL CARE, AND HEATING?: NOT HARD AT ALL

## 2023-01-17 NOTE — PROGRESS NOTES
Subjective:      Patient ID: Lexie Isaacs 58 y.o. female. .is here for evaluation for URI       HPI    5 days cough and congestion.  Clear nasal discharge. Coughing up green mucous. No fever, chest pain, dyspnea.  Mild ST.  Chest was a bit rattly over the weekend. No nausea, vomiting, diarrhea  COVID test negative.   RX: Robitussion DM is not helping.   Is to start Enbrel this week.     Outpatient Medications Marked as Taking for the 1/17/23 encounter (Office Visit) with Lary Herrmann MD   Medication Sig Dispense Refill    Etanercept (ENBREL SURECLICK) 50 MG/ML SOAJ Inject 50 mg sc weekly. 4 mL 4    predniSONE (DELTASONE) 5 MG tablet Take 1 Tab daily. 90 tablet 0    escitalopram (LEXAPRO) 10 MG tablet Take 1 tablet by mouth daily 90 tablet 3    hydroxychloroquine (PLAQUENIL) 200 MG tablet Take 1 tablet by mouth 2 times daily 180 tablet 0    triamterene-hydroCHLOROthiazide (MAXZIDE) 75-50 MG per tablet TAKE 1 TABLET BY MOUTH ONE TIME A DAY 90 tablet 1    rosuvastatin (CRESTOR) 10 MG tablet TAKE ONE TABLET BY MOUTH NIGHTLY 90 tablet 1    traZODone (DESYREL) 50 MG tablet Take 1-2 tablets by mouth nightly 180 tablet 3    tiZANidine (ZANAFLEX) 4 MG tablet Take 1 tablet by mouth every 8 hours as needed (muscle spasm) 30 tablet 2    ondansetron (ZOFRAN) 4 MG tablet Take 1 tablet by mouth daily as needed for Nausea or Vomiting 30 tablet 0    Omega-3 Fatty Acids (FISH OIL) 1000 MG CAPS Take 1,000 mg by mouth daily      Multiple Vitamins-Minerals (THERAPEUTIC MULTIVITAMIN-MINERALS) tablet Take 1 tablet by mouth daily      hyoscyamine (ANASPAZ;LEVSIN) 125 MCG tablet Take 125 mcg by mouth every 4 hours as needed for Cramping      Probiotic Product (PROBIOTIC ADVANCED PO) Take 1 tablet by mouth daily          Allergies   Allergen Reactions    Lasix [Furosemide]      tinnitus      Naltrexone Nausea Only     Dizziness    Oxycodone-Acetaminophen Itching    Atorvastatin Myalgia              Patient Active Problem List  Diagnosis    Depression with anxiety    Spastic colon    Hyperglycemia    GERD (gastroesophageal reflux disease)    DDD (degenerative disc disease), cervical    Essential hypertension    Hypercholesterolemia    Hemangioma of liver    Leg edema       Past Medical History:   Diagnosis Date    Abnormal Pap smear of cervix     Autoimmune disorder ([address]     Breast disorder     DDD (degenerative disc disease), cervical     resolved s/p surgery    Depression with anxiety     Endometriosis     Essential hypertension 06/20/2019    GERD (gastroesophageal reflux disease)     Hemangioma of liver 04/12/2022    Hypercholesterolemia     Hyperglycemia     Menopause ovarian failure     Migraine     PONV (postoperative nausea and vomiting)     with general anesthesia    Spastic colon        Past Surgical History:   Procedure Laterality Date    APPENDECTOMY      CERVICAL LAMINECTOMY  2010    CHOLECYSTECTOMY      COLONOSCOPY      COLONOSCOPY  2/7/2022    COLONOSCOPY POLYPECTOMY SNARE/COLD BIOPSY performed by Denise Gallo MD at [facility]      d/t bleeding    LAPAROSCOPY      x3 for endometriosis    UPPER GASTROINTESTINAL ENDOSCOPY N/A 10/29/2020    EGD BIOPSY performed by Denise Gallo MD at [place] [id number] N/A 2/7/2022    EGD BIOPSY performed by [facility]ise [facility]o MD at [facility] ENDOSCOPY        Family History   Problem Relation Age of Onset    Breast Cancer Mother 66        ER/AZ+ HER2-    Cancer Father         kidney    Breast Cancer Paternal Cousin 48    Cancer Paternal Great Grandmother         melanoma? Social History     Tobacco Use    Smoking status: Never    Smokeless tobacco: Never   Vaping Use    Vaping Use: Never used   Substance Use Topics    Alcohol use:  Yes     Alcohol/week: 7.0 standard drinks     Types: 7 Glasses of wine per week    Drug use: No            Review of Systems  Review of Systems    Objective:   Physical Exam  Vitals:    01/17/23 1600 BP: 126/68   Pulse: 69   Resp: 14   Temp: 97.9 °F (36.6 °C)   TempSrc: Temporal   SpO2: 100%   Weight: 228 lb (103.4 kg)       Physical Exam  NAD    No respiratory distress. Skin turgor normal, no rash. ar exam - bilateral normal, TM intact without perforation or effusion, external canal normal. No significant ceruminosis noted. Nasal exam; septum midline, no deformities, nares patent, normal mucosa with mild swelling, no polyps, no bleeding. Pharynx normal, no oral lesions, dentition in good repair. No cervical, supraclavicular or submandibular LNS. Lungs bilateral rhonchi, no wheeze or rales. No accessory muscle use. Card reg with no murmer or gallop  Ext - no c/c/e      Assessment:       Diagnosis Orders   1. Acute bronchitis, unspecified organism  azithromycin (ZITHROMAX) 250 MG tablet             Plan:      Side effects of current medications reviewed and questions answered. Call or return to clinic prn if these symptoms worsen or fail to improve as anticipated.

## 2023-01-19 ENCOUNTER — PHARMACY VISIT (OUTPATIENT)
Dept: PHARMACY | Age: 58
End: 2023-01-19

## 2023-01-19 DIAGNOSIS — M05.79 RHEUMATOID ARTHRITIS INVOLVING MULTIPLE SITES WITH POSITIVE RHEUMATOID FACTOR (HCC): ICD-10-CM

## 2023-01-19 RX ORDER — MEDROXYPROGESTERONE ACETATE 150 MG/ML
INJECTION, SUSPENSION INTRAMUSCULAR
Qty: 4 ML | Refills: 3 | Status: SHIPPED | OUTPATIENT
Start: 2023-01-19

## 2023-01-19 NOTE — PROGRESS NOTES
I called the patient to inform her that I was the new medical director for the [facility] subspecialty pharmacy program.    The patient has a history of RA. The patient is under the care of rheumatology. The patient says the medication is working well. The patient has no major side effects from the medication. The patient will start Enbrel. The patient got lab work and a TB test.    The patient has had no issues getting the medication from the pharmacy. I told the patient that primary management will be by the patient's specialist. The patient verbalized understanding. I told her I collaborate with pharmacist in her care.     Megan Primrose, MD 1/19/2023 4:15 PM

## 2023-01-24 ENCOUNTER — TELEPHONE (OUTPATIENT)
Dept: RHEUMATOLOGY | Age: 58
End: 2023-01-24

## 2023-01-24 NOTE — TELEPHONE ENCOUNTER
MARGARITA.... patient called wanting you to know she is stopping the hydroxychloroquine due to severe itching, and because she's leaving for vacation to [place], she will not be starting the Enbrel until after she returns. Pt just wanted you to be aware.

## 2023-02-03 ENCOUNTER — OFFICE VISIT (OUTPATIENT)
Dept: ORTHOPEDIC SURGERY | Age: 58
End: 2023-02-03
Payer: COMMERCIAL

## 2023-02-03 VITALS — HEIGHT: 69 IN | BODY MASS INDEX: 33.77 KG/M2 | WEIGHT: 228 LBS

## 2023-02-03 DIAGNOSIS — M17.0 PRIMARY OSTEOARTHRITIS OF BOTH KNEES: ICD-10-CM

## 2023-02-03 DIAGNOSIS — S83.8X1A ACUTE MEDIAL MENISCAL INJURY OF KNEE, RIGHT, INITIAL ENCOUNTER: Primary | ICD-10-CM

## 2023-02-03 DIAGNOSIS — M25.561 ACUTE PAIN OF RIGHT KNEE: ICD-10-CM

## 2023-02-03 PROCEDURE — E0114 CRUTCH UNDERARM PAIR NO WOOD: HCPCS | Performed by: PHYSICIAN ASSISTANT

## 2023-02-03 PROCEDURE — 99203 OFFICE O/P NEW LOW 30 MIN: CPT | Performed by: PHYSICIAN ASSISTANT

## 2023-02-06 ENCOUNTER — TELEPHONE (OUTPATIENT)
Dept: ORTHOPEDIC SURGERY | Age: 58
End: 2023-02-06

## 2023-02-06 NOTE — PROGRESS NOTES
12 [facility]  History and Physical      Date:  2/3/2023    Name:  Ivory Aldana  Address:  [address] [place] [address]    :  1965      Age:   66 y.o. Chief Complaint    Knee Pain (Right knee pain /10, swelling, fell down some stairs. )      History of Present Illness:  Ivory Aldana is a 66 y.o. female who presents for an evaluation of right knee pain. The patient states that 2 days ago she was ambulating down stairs when she felt a sharp pain in the medial joint line of the right knee. Since then she has been having difficulty weightbearing and bending the right knee due to medial joint line discomfort. She describes the pain as achy and sharp. She feels a hard stop at approximately 30 to 40 degrees of flexion. She denies any pain, difficulty with range of motion, instability or any other symptoms prior to this incident. Conservative treatment has been utilized including: Rest, ice, activity modification, heat, and over-the-counter pain medication. The patient denies any new injury. The patient denies any numbness, paresthesias, or weakness.              Past Medical History:   Diagnosis Date    Abnormal Pap smear of cervix     Autoimmune disorder ([facility] [address]     Breast disorder     DDD (degenerative disc disease), cervical     resolved s/p surgery    Depression with anxiety     Endometriosis     Essential hypertension 2019    GERD (gastroesophageal reflux disease)     Hemangioma of liver 2022    Hypercholesterolemia     Hyperglycemia     Menopause ovarian failure     Migraine     PONV (postoperative nausea and vomiting)     with general anesthesia    Spastic colon         Past Surgical History:   Procedure Laterality Date    APPENDECTOMY      CERVICAL LAMINECTOMY      CHOLECYSTECTOMY      COLONOSCOPY      COLONOSCOPY  2022    COLONOSCOPY POLYPECTOMY SNARE/COLD BIOPSY performed by Alessio Birmingham MD at [address] ENDOSCOPY    ENDOMETRIAL ABLATION      d/t bleeding    LAPAROSCOPY      x3 for endometriosis    UPPER GASTROINTESTINAL ENDOSCOPY N/A 10/29/2020    EGD BIOPSY performed by Ralph Kwok MD at [address] [employer] N/A 2/7/2022    EGD BIOPSY performed by Ralph Kwok MD at [address] ENDOSCOPY       Family History   Problem Relation Age of Onset    Breast Cancer Mother 66        ER/NE+ HER2-    Cancer Father         kidney    Breast Cancer Paternal Cousin 48    Cancer Paternal Great Grandmother         melanoma? Social History     Socioeconomic History    Marital status:      Spouse name: None    Number of children: None    Years of education: None    Highest education level: None   Tobacco Use    Smoking status: Never    Smokeless tobacco: Never   Vaping Use    Vaping Use: Never used   Substance and Sexual Activity    Alcohol use: Yes     Alcohol/week: 7.0 standard drinks     Types: 7 Glasses of wine per week    Drug use: No    Sexual activity: Yes     Partners: Male     Social Determinants of Health     Financial Resource Strain: Low Risk     Difficulty of Paying Living Expenses: Not hard at all   Food Insecurity: No Food Insecurity    Worried About [address] in the Last Year: Never true    Ran Out of Food in the Last Year: Never true       Current Outpatient Medications   Medication Sig Dispense Refill    Etanercept (ENBREL SURECLICK) 50 MG/ML SOAJ Inject 50 mg under the skin weekly. 4 mL 3    predniSONE (DELTASONE) 5 MG tablet Take 1 Tab daily.  90 tablet 0    escitalopram (LEXAPRO) 10 MG tablet Take 1 tablet by mouth daily 90 tablet 3    hydroxychloroquine (PLAQUENIL) 200 MG tablet Take 1 tablet by mouth 2 times daily 180 tablet 0    triamterene-hydroCHLOROthiazide (MAXZIDE) 75-50 MG per tablet TAKE 1 TABLET BY MOUTH ONE TIME A DAY 90 tablet 1    rosuvastatin (CRESTOR) 10 MG tablet TAKE ONE TABLET BY MOUTH NIGHTLY 90 tablet 1    traZODone (DESYREL) 50 MG tablet Take 1-2 tablets by mouth nightly 180 tablet 3    tiZANidine (ZANAFLEX) 4 MG tablet Take 1 tablet by mouth every 8 hours as needed (muscle spasm) 30 tablet 2    ondansetron (ZOFRAN) 4 MG tablet Take 1 tablet by mouth daily as needed for Nausea or Vomiting 30 tablet 0    Omega-3 Fatty Acids (FISH OIL) 1000 MG CAPS Take 1,000 mg by mouth daily      Multiple Vitamins-Minerals (THERAPEUTIC MULTIVITAMIN-MINERALS) tablet Take 1 tablet by mouth daily      hyoscyamine (ANASPAZ;LEVSIN) 125 MCG tablet Take 125 mcg by mouth every 4 hours as needed for Cramping      Probiotic Product (PROBIOTIC ADVANCED PO) Take 1 tablet by mouth daily       No current facility-administered medications for this visit. Allergies   Allergen Reactions    Lasix [Furosemide]      tinnitus      Naltrexone Nausea Only     Dizziness    Oxycodone-Acetaminophen Itching    Atorvastatin Myalgia                Review of Systems:  A 14 point review of systems was completed by the patient and is available in the media section of the scanned medical record and was reviewed. Vital Signs:   Ht 5' 9\" (1.753 m)   Wt 228 lb (103.4 kg)   BMI 33.67 kg/m²     General Exam:    General: Bard Quinteros is a healthy and well appearing 66y.o. year old female who is sitting comfortably in our office in no acute distress. Neuro: Alert & Oriented x 3,  normal,  no focal deficits noted. Normal mood & affect. Eyes: Sclera clear  Ears: Normal external ear  Mouth: No oral lesions observed  Pulm: Respirations unlabored and regular   Skin: Warm, well perfused      Knee Examination: Right    Inspection: No effusion, ecchymosis, discoloration, erythema, excessive warmth. no gross deformities, no signs of infection. Palpation: There is mild patellofemoral crepitus, there is medial joint line tenderness. No other osseous or soft tissue tenderness.   Negative calf tenderness    Range of Motion:  0-40 degrees with exquisite medial joint line discomfort at terminal flexion. Appropriate patellar mobility. Strength: Grossly intact    Special Tests:  No ligament instability, valgus and varus stress test are stable at 0 and 30°. Lachman's exhibits a solid endpoint. Negative posterior drawer. Negative Homans sign. Unable to perform Destini's or flexion test due to patient discomfort. Gait: antalgic, without the use of assistive devices    Alignment: Mild varus deformity    Neuro: Sensation equal bilateral lower extremities    Vascular: 2+ posterior tibialis pulse      Contralateral Knee Comparison Examination: Left    Inspection: No effusion, ecchymosis, discoloration, erythema, excessive warmth. no gross deformities, no signs of infection. Palpation: There is mild patellofemoral crepitus, there is no joint line tenderness. No other osseous or soft tissue tenderness around the knee. Negative calf tenderness    Range of Motion: 0-130 degrees without pain or difficulty. Appropriate patellar mobility. Strength: Grossly intact    Special Tests: No ligament instability, valgus and varus stress test are stable at 0 and 30°. Lachman's exhibits a solid endpoint. Negative posterior drawer. Negative Homans sign    Gait: Antalgic, through the right lower extremity without the utilization of an assistive device    Alignment: Mild varus deformity    Neuro: Sensation equal bilateral lower extremities    Vascular: 2+ posterior tibialis pulse        Radiology:   Previously obtain imaging reviewed. X-rays obtained and reviewed in office: AP and merchant view of both knees and a lateral of the right. Impression: No fractures, dislocations, visible tumors, or signs of acute trauma. The medial and lateral femoral-tibial compartments exhibit less than 50% decreased joint space bilaterally. There's decreased joint spacing in the patellofemoral joints bilaterally. KL grade 2.  Patella is riding appropriately in the trochlear groove with no subluxation or tilt noted.        Office Procedures:  Orders Placed This Encounter   Procedures    XR KNEE RIGHT (MIN 4 VIEWS)     Standing Status:   Future     Number of Occurrences:   1     Standing Expiration Date:   2/3/2024    MRI KNEE RIGHT WO CONTRAST     Standing Status:   Future     Standing Expiration Date:   2/3/2024     Scheduling Instructions:      [facility]      [address] [facility], [address]      [place] [address]            PHONE: [phone]      FAX: [phone]     Order Specific Question:   Reason for exam:     Answer:   R/O Medial meniscus tear    Aluminum Crutches     Patient was prescribed Medline Aluminum Crutches.  This mobility device is required for the following reasons:    Patient has mobility limitations that significantly impair their ability to participate in one or more mobility related activities of daily living.  The patient is able to safely use the mobility device.  Functional mobility deficit can be sufficiently resolved with the use of this device.    Verbal and written instructions for the use of and application of this item were provided.  The patient was educated and fit by a healthcare professional with expert knowledge and specialization in brace application while under the direct supervision of the treating physician. They were instructed to contact the office immediately should the equipment result in increased pain, decreased sensation, increased swelling or worsening of the condition.            Assessment: This patient is a 58 y.o. female presenting to the office for an evaluation of acute onset right knee pain.  This patient has a working diagnosis of a right medial meniscus tear in the presence of mild osteoarthritis in the right knee.    Encounter Diagnoses   Name Primary?    Acute medial meniscal injury of knee, right, initial encounter Yes    Acute pain of right knee     Primary osteoarthritis of both knees            Medical decision making:  Patient's  workup and evaluation were reviewed with the patient in the office today. Imaging was reviewed with the patient. Given the patient's history and physical exam a believe with a reasonable degree of medical certainty that the patient requires an MRI to evaluate for a medial meniscus tear in the right knee. She did not exhibit any symptoms of discomfort or instability within the right knee prior to this incident several days ago. Patient was educated on conservative treatment for her condition as detailed below. She may require an arthroscopy for partial medial meniscectomy which remains to be determined after reviewing MRI imaging and corroboration with her history and physical exam.     All the patient's questions were answered while in the clinic. The patient is understanding of all instructions and agrees with the plan. Treatment Plan:    Utilize crutches for 50% weightbearing  MRI of the right knee without contrast  Home exercise program  Activity modification/Rest   Ice 20 minutes ever 1-2 hours PRN  Take medications as prescribed/instructed  Elevation   Lightweight exercise/low impact exercise  Appropriate diet/weight management      Follow up: After receiving MRI imaging and as needed        This patient exhibits moderate complexity for obtaining an independent history, reviewing past medical records, independent interpretation of diagnostic imaging, and further coordinating care. The patient exhibits low risk given that the patient is being treated conservatively at this time. Barbara Marks PA-C    Physician Assistant - Certified  [address] [place] [address]    02/06/23 10:09 AM      This dictation was performed with a verbal recognition program [facility]) and it was checked for errors. It is possible that there are still dictated errors within this office note. If so, please bring any errors to my attention for an addendum.   All efforts were made to ensure that this office note is accurate.

## 2023-02-09 ENCOUNTER — OFFICE VISIT (OUTPATIENT)
Dept: ORTHOPEDIC SURGERY | Age: 58
End: 2023-02-09

## 2023-02-09 VITALS — HEIGHT: 69 IN | WEIGHT: 228 LBS | BODY MASS INDEX: 33.77 KG/M2

## 2023-02-09 DIAGNOSIS — M17.11 PRIMARY OSTEOARTHRITIS OF RIGHT KNEE: Primary | ICD-10-CM

## 2023-02-14 RX ORDER — ROSUVASTATIN CALCIUM 10 MG/1
TABLET, COATED ORAL
Qty: 90 TABLET | Refills: 1 | Status: SHIPPED | OUTPATIENT
Start: 2023-02-14

## 2023-02-14 RX ORDER — TRIAMTERENE AND HYDROCHLOROTHIAZIDE 75; 50 MG/1; MG/1
TABLET ORAL
Qty: 90 TABLET | Refills: 1 | Status: SHIPPED | OUTPATIENT
Start: 2023-02-14

## 2023-02-14 NOTE — PROGRESS NOTES
12 [facility]  History and Physical      Date:  2023    Name:  Karrie Dillon  Address:  [address] [place] [address]    :  1965      Age:   66 y.o. Chief Complaint    Knee Pain (Right knee MRI)      History of Present Illness:  Karrie Dillon is a 66 y.o. female who presents for evaluation of her right knee following an MRI. Reference is made to her previous note of 2-3-2023 in which she complains of pain ambulating down stairs, she also had a sharp pain to the medial joint line. .     The patient denies any new injury. The patient denies any catching, giving way, joint locking, numbness, paresthesias, or weakness.              Past Medical History:   Diagnosis Date    Abnormal Pap smear of cervix     Autoimmune disorder ([address]     Breast disorder     DDD (degenerative disc disease), cervical     resolved s/p surgery    Depression with anxiety     Endometriosis     Essential hypertension 2019    GERD (gastroesophageal reflux disease)     Hemangioma of liver 2022    Hypercholesterolemia     Hyperglycemia     Menopause ovarian failure     Migraine     PONV (postoperative nausea and vomiting)     with general anesthesia    Spastic colon         Past Surgical History:   Procedure Laterality Date    APPENDECTOMY      CERVICAL LAMINECTOMY      CHOLECYSTECTOMY      COLONOSCOPY      COLONOSCOPY  2022    COLONOSCOPY POLYPECTOMY SNARE/COLD BIOPSY performed by Jaclyn Plummer MD at [facility]      d/t bleeding    LAPAROSCOPY      x3 for endometriosis    UPPER GASTROINTESTINAL ENDOSCOPY N/A 10/29/2020    EGD BIOPSY performed by Jaclyn Plummer MD at [facility] N/A 2022    EGD BIOPSY performed by Jaclyn Plummer MD at [address] History   Problem Relation Age of Onset    Breast Cancer Mother 66        ER/UT+ HER2-    Cancer Father kidney    Breast Cancer Paternal Cousin 48    Cancer Paternal Great Grandmother         melanoma? Social History     Socioeconomic History    Marital status:      Spouse name: None    Number of children: None    Years of education: None    Highest education level: None   Tobacco Use    Smoking status: Never    Smokeless tobacco: Never   Vaping Use    Vaping Use: Never used   Substance and Sexual Activity    Alcohol use: Yes     Alcohol/week: 7.0 standard drinks     Types: 7 Glasses of wine per week    Drug use: No    Sexual activity: Yes     Partners: Male     Social Determinants of Health     Financial Resource Strain: Low Risk     Difficulty of Paying Living Expenses: Not hard at all   Food Insecurity: No Food Insecurity    Worried About Running Out of Food in the Last Year: Never true    Ran Out of Food in the Last Year: Never true       Current Outpatient Medications   Medication Sig Dispense Refill    rosuvastatin (CRESTOR) 10 MG tablet TAKE ONE TABLET BY MOUTH NIGHTLY 90 tablet 1    triamterene-hydroCHLOROthiazide (MAXZIDE) 75-50 MG per tablet TAKE 1 TABLET BY MOUTH ONE TIME A DAY 90 tablet 1    Etanercept (ENBREL SURECLICK) 50 MG/ML SOAJ Inject 50 mg under the skin weekly. 4 mL 3    predniSONE (DELTASONE) 5 MG tablet Take 1 Tab daily.  90 tablet 0    escitalopram (LEXAPRO) 10 MG tablet Take 1 tablet by mouth daily 90 tablet 3    traZODone (DESYREL) 50 MG tablet Take 1-2 tablets by mouth nightly 180 tablet 3    tiZANidine (ZANAFLEX) 4 MG tablet Take 1 tablet by mouth every 8 hours as needed (muscle spasm) 30 tablet 2    ondansetron (ZOFRAN) 4 MG tablet Take 1 tablet by mouth daily as needed for Nausea or Vomiting 30 tablet 0    Omega-3 Fatty Acids (FISH OIL) 1000 MG CAPS Take 1,000 mg by mouth daily      Multiple Vitamins-Minerals (THERAPEUTIC MULTIVITAMIN-MINERALS) tablet Take 1 tablet by mouth daily      hyoscyamine (ANASPAZ;LEVSIN) 125 MCG tablet Take 125 mcg by mouth every 4 hours as needed for Cramping      Probiotic Product (PROBIOTIC ADVANCED PO) Take 1 tablet by mouth daily       No current facility-administered medications for this visit. Allergies   Allergen Reactions    Lasix [Furosemide]      tinnitus      Naltrexone Nausea Only     Dizziness    Oxycodone-Acetaminophen Itching    Atorvastatin Myalgia                Review of Systems:  A 14 point review of systems was completed by the patient and is available in the media section of the scanned medical record and was reviewed. Vital Signs:   Ht 5' 9\" (1.753 m)   Wt 228 lb (103.4 kg)   BMI 33.67 kg/m²     General Exam:    General: Tad Chavez is a healthy and well appearing 66y.o. year old female who is sitting comfortably in our office in no acute distress. Neuro: Alert & Oriented x 3,  normal,  no focal deficits noted. Normal mood & affect. Eyes: Sclera clear  Ears: Normal external ear  Mouth: No oral lesions observed  Pulm: Respirations unlabored and regular   Skin: Warm, well perfused      Knee Examination: Right    Inspection: Normal no effusion, ecchymosis, discoloration, erythema, excessive warmth. no gross deformities, no signs of infection. Palpation: There is moderate patellofemoral crepitus, there is medial joint line tenderness. No other osseous or soft tissue tenderness. Negative calf tenderness    Range of Motion: Normal 0-130 degrees without pain or difficulty. Appropriate patellar mobility. Strength:  5/5 quadriceps, 5/5 hamstrings    Special Tests: Normal no ligament instability, valgus and varus stress test are stable at 0 and 30°. Lachman's exhibits a solid endpoint. Negative posterior drawer.   Negative Homans sign    Gait: Non antalgic, without the use of assistive devices    Alignment: No varus deformity    Neuro: Sensation equal bilateral lower extremities    Vascular: 2+ posterior tibialis pulse      Contralateral Knee Comparison Examination: Left    Inspection: Normal no effusion, ecchymosis, discoloration, erythema, excessive warmth. no gross deformities, no signs of infection. Palpation: There is mild patellofemoral crepitus, there is no joint line tenderness. No other osseous or soft tissue tenderness around the knee. Negative calf tenderness    Range of Motion: Normal 0-130 degrees without pain or difficulty. Appropriate patellar mobility. Strength:  5/5 quadriceps, 5/5 hamstrings    Special Tests: Normal no ligament instability, valgus and varus stress test are stable at 0 and 30°. Lachman's exhibits a solid endpoint. Negative posterior drawer. Negative Homans sign    Gait: Normal non antalgic, without the use of assistive devices    Alignment: No varus deformity    Neuro: Sensation equal bilateral lower extremities    Vascular: 2+ posterior tibialis pulse        Radiology:   Previously obtain imaging reviewed. Review of the MRI with the patient. Most importantly there is not a surgical medial meniscus lesion requiring treatment. She does have osteoarthritis of both the patellofemoral and medial tibiofemoral compartment graded at a mild to moderate level. Office Procedures:  Under sterile conditions after appropriate prepping to the right knee and after informed consent, the inferior lateral portal was utilized for a cortisone injection 2 cc of Depo-Medrol 40 mg/cc. The procedure went well without complication. A Band-Aid was placed on the portal site. Assessment: This patient is a 66 y.o. female for conservative treatment of osteoarthritis of the right knee this will be treated with a nonoperative approach. Medical decision making:  Patient's workup and evaluation were reviewed with the patient in the office today. Imaging was reviewed with the patient. And there is no recommendation for surgery. All the patient's questions were answered while in the clinic.   The patient is understanding of all instructions and agrees with the plan.      Treatment Plan:    Physical therapy recommended  Activity modification/Rest   Ice 20 minutes ever 1-2 hours PRN  Take medications as prescribed/instructed  Elevation   Lightweight exercise/low impact exercise  Appropriate diet/weight management      Follow up: As needed              This patient exhibits mild complexity for obtaining an independent history, reviewing past medical records, independent interpretation of diagnostic imaging, and further coordinating care. The patient exhibits mild risk as a nonoperative approach is being recommended. This dictation was performed with a verbal recognition program ([employer]AGON) and it was checked for errors. It is possible that there are still dictated errors within this office note. If so, please bring any errors to my attention for an addendum. All efforts were made to ensure that this office note is accurate. This dictation was performed with a verbal recognition program (DRAGON) and it was checked for errors. It is possible that there are still dictated errors within this office note. If so, please bring any errors to my attention for an addendum. All efforts were made to ensure that this office note is accurate. Supervising Physician Attestation:  I, Dr. Maynor Mcknight, personally performed the services described in this documentation as above, and it is both accurate and complete and I agree with all pertinent clinical information. I personally interviewed the patient and performed a physical examination and medical decision making. I discussed the patient's condition and treatment options and have  reviewed and agree with the past medical, family and social history unless otherwise noted. All of the patient's questions were answered. I personally supervised the Orthopedic and Sportsmedicine Fellow when when noted in the evaluation and treatment plan of the patient.       Board Certified Orthopaedic Surgeon  [facility] and [address]  President and Medical Director  [facility] [employer]  Professor of [place]

## 2023-02-21 ENCOUNTER — PATIENT MESSAGE (OUTPATIENT)
Dept: FAMILY MEDICINE CLINIC | Age: 58
End: 2023-02-21

## 2023-02-21 RX ORDER — SCOLOPAMINE TRANSDERMAL SYSTEM 1 MG/1
1 PATCH, EXTENDED RELEASE TRANSDERMAL
Qty: 4 PATCH | Refills: 5 | Status: SHIPPED | OUTPATIENT
Start: 2023-02-21

## 2023-02-21 NOTE — TELEPHONE ENCOUNTER
From: Shahnaz Suero  To: Dr. Sangeetha Riveracher: 2/21/2023  6:42 AM EST  Subject: Refill     Hi Dr. Leonid Lyles,  I am going on a cruise soon and will need a refill on the scopolamine patches. Please send to [facility] in [address] Thank you!   Elle Padron    Requested Prescriptions     Pending Prescriptions Disp Refills    scopolamine (TRANSDERM-SCOP, 1.5 MG,) transdermal patch 4 patch 5     Sig: Place 1 patch onto the skin every 72 hours     Last ov 8/23/2022  Last labs 12/21/22

## 2023-03-21 ENCOUNTER — OFFICE VISIT (OUTPATIENT)
Dept: FAMILY MEDICINE CLINIC | Age: 58
End: 2023-03-21
Payer: COMMERCIAL

## 2023-03-21 VITALS
WEIGHT: 228.6 LBS | RESPIRATION RATE: 12 BRPM | SYSTOLIC BLOOD PRESSURE: 130 MMHG | OXYGEN SATURATION: 98 % | BODY MASS INDEX: 33.76 KG/M2 | HEART RATE: 94 BPM | DIASTOLIC BLOOD PRESSURE: 70 MMHG | TEMPERATURE: 97.8 F

## 2023-03-21 DIAGNOSIS — E78.00 HYPERCHOLESTEROLEMIA: ICD-10-CM

## 2023-03-21 DIAGNOSIS — J02.9 SORE THROAT: ICD-10-CM

## 2023-03-21 DIAGNOSIS — R11.0 NAUSEA: ICD-10-CM

## 2023-03-21 DIAGNOSIS — J06.9 ACUTE URI: ICD-10-CM

## 2023-03-21 DIAGNOSIS — G89.29 CHRONIC PAIN OF RIGHT KNEE: ICD-10-CM

## 2023-03-21 DIAGNOSIS — F41.8 DEPRESSION WITH ANXIETY: ICD-10-CM

## 2023-03-21 DIAGNOSIS — R53.83 FATIGUE, UNSPECIFIED TYPE: ICD-10-CM

## 2023-03-21 DIAGNOSIS — R73.9 HYPERGLYCEMIA: Primary | ICD-10-CM

## 2023-03-21 DIAGNOSIS — I10 ESSENTIAL HYPERTENSION: ICD-10-CM

## 2023-03-21 DIAGNOSIS — M05.79 RHEUMATOID ARTHRITIS INVOLVING MULTIPLE SITES WITH POSITIVE RHEUMATOID FACTOR (HCC): ICD-10-CM

## 2023-03-21 DIAGNOSIS — M25.561 CHRONIC PAIN OF RIGHT KNEE: ICD-10-CM

## 2023-03-21 LAB — S PYO AG THROAT QL: NORMAL

## 2023-03-21 PROCEDURE — 99214 OFFICE O/P EST MOD 30 MIN: CPT | Performed by: FAMILY MEDICINE

## 2023-03-21 PROCEDURE — 3075F SYST BP GE 130 - 139MM HG: CPT | Performed by: FAMILY MEDICINE

## 2023-03-21 PROCEDURE — 87880 STREP A ASSAY W/OPTIC: CPT | Performed by: FAMILY MEDICINE

## 2023-03-21 PROCEDURE — 3078F DIAST BP <80 MM HG: CPT | Performed by: FAMILY MEDICINE

## 2023-03-21 RX ORDER — ONDANSETRON 4 MG/1
4 TABLET, FILM COATED ORAL DAILY PRN
Qty: 30 TABLET | Refills: 0 | Status: SHIPPED | OUTPATIENT
Start: 2023-03-21

## 2023-03-21 RX ORDER — AZITHROMYCIN 250 MG/1
TABLET, FILM COATED ORAL
Qty: 6 TABLET | Refills: 0 | Status: SHIPPED | OUTPATIENT
Start: 2023-03-21

## 2023-03-21 SDOH — ECONOMIC STABILITY: FOOD INSECURITY: WITHIN THE PAST 12 MONTHS, YOU WORRIED THAT YOUR FOOD WOULD RUN OUT BEFORE YOU GOT MONEY TO BUY MORE.: NEVER TRUE

## 2023-03-21 SDOH — ECONOMIC STABILITY: FOOD INSECURITY: WITHIN THE PAST 12 MONTHS, THE FOOD YOU BOUGHT JUST DIDN'T LAST AND YOU DIDN'T HAVE MONEY TO GET MORE.: NEVER TRUE

## 2023-03-21 SDOH — ECONOMIC STABILITY: INCOME INSECURITY: HOW HARD IS IT FOR YOU TO PAY FOR THE VERY BASICS LIKE FOOD, HOUSING, MEDICAL CARE, AND HEATING?: NOT HARD AT ALL

## 2023-03-21 SDOH — ECONOMIC STABILITY: HOUSING INSECURITY
IN THE LAST 12 MONTHS, WAS THERE A TIME WHEN YOU DID NOT HAVE A STEADY PLACE TO SLEEP OR SLEPT IN A SHELTER (INCLUDING NOW)?: NO

## 2023-03-21 NOTE — PROGRESS NOTES
Dispense Refill    ondansetron (ZOFRAN) 4 MG tablet Take 1 tablet by mouth daily as needed for Nausea or Vomiting 30 tablet 0    azithromycin (ZITHROMAX Z-RAIZA) 250 MG tablet Sig 2 tabs po QD x 1d, then 1 tab po QD x 4d 6 tablet 0    rosuvastatin (CRESTOR) 10 MG tablet TAKE ONE TABLET BY MOUTH NIGHTLY 90 tablet 1    triamterene-hydroCHLOROthiazide (MAXZIDE) 75-50 MG per tablet TAKE 1 TABLET BY MOUTH ONE TIME A DAY 90 tablet 1    escitalopram (LEXAPRO) 10 MG tablet Take 1 tablet by mouth daily 90 tablet 3    traZODone (DESYREL) 50 MG tablet Take 1-2 tablets by mouth nightly 180 tablet 3    tiZANidine (ZANAFLEX) 4 MG tablet Take 1 tablet by mouth every 8 hours as needed (muscle spasm) 30 tablet 2    Omega-3 Fatty Acids (FISH OIL) 1000 MG CAPS Take 1,000 mg by mouth daily      Multiple Vitamins-Minerals (THERAPEUTIC MULTIVITAMIN-MINERALS) tablet Take 1 tablet by mouth daily      hyoscyamine (ANASPAZ;LEVSIN) 125 MCG tablet Take 125 mcg by mouth every 4 hours as needed for Cramping      Probiotic Product (PROBIOTIC ADVANCED PO) Take 1 tablet by mouth daily      Etanercept (ENBREL SURECLICK) 50 MG/ML SOAJ Inject 50 mg under the skin weekly. (Patient not taking: Reported on 3/21/2023) 4 mL 3    predniSONE (DELTASONE) 5 MG tablet Take 1 Tab daily. (Patient not taking: Reported on 3/21/2023) 90 tablet 0     No current facility-administered medications for this visit. Wt Readings from Last 3 Encounters:   03/21/23 228 lb 9.6 oz (103.7 kg)   02/09/23 228 lb (103.4 kg)   02/03/23 228 lb (103.4 kg)         ASSESSMENT / PLAN:    1. Nausea  Cont prn zofran  Use intermittent  - ondansetron (ZOFRAN) 4 MG tablet; Take 1 tablet by mouth daily as needed for Nausea or Vomiting  Dispense: 30 tablet; Refill: 0    2. Hyperglycemia  Due fu bloodwork  Check as below  Consider metformin ER dosing for persistent elevation of blood sugars and to help weight  - Hemoglobin A1C; Future  - Comprehensive Metabolic Panel;  Future  - TSH;

## 2023-03-22 DIAGNOSIS — R53.83 FATIGUE, UNSPECIFIED TYPE: ICD-10-CM

## 2023-03-22 DIAGNOSIS — R73.9 HYPERGLYCEMIA: ICD-10-CM

## 2023-03-23 LAB
ALBUMIN SERPL-MCNC: 4.5 G/DL (ref 3.4–5)
ALBUMIN/GLOB SERPL: 1.7 {RATIO} (ref 1.1–2.2)
ALP SERPL-CCNC: 84 U/L (ref 40–129)
ALT SERPL-CCNC: 25 U/L (ref 10–40)
ANION GAP SERPL CALCULATED.3IONS-SCNC: 16 MMOL/L (ref 3–16)
AST SERPL-CCNC: 16 U/L (ref 15–37)
BILIRUB SERPL-MCNC: 0.3 MG/DL (ref 0–1)
BUN SERPL-MCNC: 10 MG/DL (ref 7–20)
CALCIUM SERPL-MCNC: 9.8 MG/DL (ref 8.3–10.6)
CHLORIDE SERPL-SCNC: 100 MMOL/L (ref 99–110)
CHOLEST SERPL-MCNC: 173 MG/DL (ref 0–199)
CO2 SERPL-SCNC: 27 MMOL/L (ref 21–32)
CREAT SERPL-MCNC: 0.9 MG/DL (ref 0.6–1.1)
EST. AVERAGE GLUCOSE BLD GHB EST-MCNC: 114 MG/DL
FSH SERPL-ACNC: 1.2 MIU/ML
GFR SERPLBLD CREATININE-BSD FMLA CKD-EPI: >60 ML/MIN/{1.73_M2}
GLUCOSE SERPL-MCNC: 94 MG/DL (ref 70–99)
HBA1C MFR BLD: 5.6 %
HDLC SERPL-MCNC: 52 MG/DL (ref 40–60)
LDLC SERPL CALC-MCNC: 97 MG/DL
LH SERPL-ACNC: 1.2 MIU/ML
POTASSIUM SERPL-SCNC: 4.4 MMOL/L (ref 3.5–5.1)
PROT SERPL-MCNC: 7.1 G/DL (ref 6.4–8.2)
SODIUM SERPL-SCNC: 143 MMOL/L (ref 136–145)
T4 FREE SERPL-MCNC: 1 NG/DL (ref 0.9–1.8)
TRIGL SERPL-MCNC: 122 MG/DL (ref 0–150)
TSH SERPL DL<=0.005 MIU/L-ACNC: 1.39 UIU/ML (ref 0.27–4.2)
VLDLC SERPL CALC-MCNC: 24 MG/DL

## 2023-03-24 LAB — TESTOST SERPL-MCNC: 7 NG/DL (ref 20–70)

## 2023-03-29 ENCOUNTER — PATIENT MESSAGE (OUTPATIENT)
Dept: FAMILY MEDICINE CLINIC | Age: 58
End: 2023-03-29

## 2023-03-29 NOTE — TELEPHONE ENCOUNTER
From: Denny Me  To: Dr. Barker General: 3/29/2023 9:55 AM EDT  Subject: Weight Loss    Good morning,  Dr. Abril Foy seemed to think that testosterone may be ok. She said that I would need several other test to verify the value. At this point, I would like to proceed with trying something for weight loss. I have friends who are trying semaglutide. I think they are ordering it from a compounding pharmacy or [facility] ([facility]). She said that it would cost 499.00 for a 3-4 month supply. I started my enbrel this week so want to make sure that whatever I take will not interact. My daughter is getting  in June and I really need to start getting this weight off! Thanks!

## 2023-03-30 RX ORDER — SEMAGLUTIDE 1.34 MG/ML
0.5 INJECTION, SOLUTION SUBCUTANEOUS WEEKLY
Qty: 3 ADJUSTABLE DOSE PRE-FILLED PEN SYRINGE | Refills: 3 | Status: SHIPPED | OUTPATIENT
Start: 2023-03-30

## 2023-03-31 ENCOUNTER — TELEPHONE (OUTPATIENT)
Dept: FAMILY MEDICINE CLINIC | Age: 58
End: 2023-03-31

## 2023-04-04 ENCOUNTER — TELEPHONE (OUTPATIENT)
Dept: ORTHOPEDIC SURGERY | Age: 58
End: 2023-04-04

## 2023-04-04 DIAGNOSIS — M17.11 PRIMARY OSTEOARTHRITIS OF RIGHT KNEE: Primary | ICD-10-CM

## 2023-04-04 NOTE — TELEPHONE ENCOUNTER
Spoke with patient after discussing with Nawaf. He recommends physical therapy and we will pre-cert for Synvisc one injection. Discussed further conservative treatments. Patient states understanding. We will contact her when gel is approved.

## 2023-04-04 NOTE — TELEPHONE ENCOUNTER
General Question     Subject: THE PT WANTS A CALL BACK/KNEE STILL HURTING  Patient and /or Facility Request: Pauline Elizalde  Contact Number: [phone]      THE PT IS STILL HAVING ISSUES WITH HERRT KNEE THAT SHE HAD A CORTISONE INJECTION IN. PLEASE CALL PT TO TALK ABOUT OTHER OOPTIONS.

## 2023-04-17 ENCOUNTER — HOSPITAL ENCOUNTER (OUTPATIENT)
Dept: PHYSICAL THERAPY | Age: 58
Setting detail: THERAPIES SERIES
Discharge: HOME | End: 2023-04-17
Payer: COMMERCIAL

## 2023-04-17 PROCEDURE — 97161 PT EVAL LOW COMPLEX 20 MIN: CPT | Performed by: PHYSICAL THERAPIST

## 2023-04-17 PROCEDURE — 97110 THERAPEUTIC EXERCISES: CPT | Performed by: PHYSICAL THERAPIST

## 2023-04-17 NOTE — PLAN OF CARE
Met: [] Not Met: [] Adjusted  4. Patient will return to household and work functional activities without increased symptoms or restriction. [] Progressing: [] Met: [] Not Met: [] Adjusted  5. Pt will be able to ascend and descend a flight of stairs reciprocally without restriction. (patient specific functional goal)    [] Progressing: [] Met: [] Not Met: [] Adjusted       Electronically signed by:  Mehran Potter, PT, DPT    Note: If patient does not return for scheduled/ recommended follow up visits, this note will serve as a discharge from care along with most recent update on progress.

## 2023-04-17 NOTE — FLOWSHEET NOTE
weekly - 3 sets - 10 reps  - Supine Bridge with Mini Swiss Ball Between Knees  - 1 x daily - 7 x weekly - 3 sets - 10 reps  - Sidelying Hip Abduction  - 1 x daily - 7 x weekly - 3 sets - 10 reps  - Hooklying Clamshell with Resistance  - 1 x daily - 7 x weekly - 3 sets - 10 reps  - Wall Quarter Squat  - 1 x daily - 7 x weekly - 2 reps - to fatigue hold  - Heel Raises with Counter Support  - 1 x daily - 7 x weekly - 3 sets - 10 reps       Therapeutic Exercise and NMR EXR  [x] (66629) Provided verbal/tactile cueing for activities related to strengthening, flexibility, endurance, ROM for improvements in LE, proximal hip, and core control with self care, mobility, lifting, ambulation.  [] (42885) Provided verbal/tactile cueing for activities related to improving balance, coordination, kinesthetic sense, posture, motor skill, proprioception  to assist with LE, proximal hip, and core control in self care, mobility, lifting, ambulation and eccentric single leg control.      NMR and Therapeutic Activities:    [x] (04507 or 62242) Provided verbal/tactile cueing for activities related to improving balance, coordination, kinesthetic sense, posture, motor skill, proprioception and motor activation to allow for proper function of core, proximal hip and LE with self care and ADLs  [] (77428) Gait Re-education- Provided training and instruction to the patient for proper LE, core and proximal hip recruitment and positioning and eccentric body weight control with ambulation re-education including up and down stairs     Home Exercise Program:    [x] (63082) Reviewed/Progressed HEP activities related to strengthening, flexibility, endurance, ROM of core, proximal hip and LE for functional self-care, mobility, lifting and ambulation/stair navigation   [] (64287)Reviewed/Progressed HEP activities related to improving balance, coordination, kinesthetic sense, posture, motor skill, proprioception of core, proximal hip and LE for self care,

## 2023-04-18 ENCOUNTER — OFFICE VISIT (OUTPATIENT)
Dept: ORTHOPEDIC SURGERY | Age: 58
End: 2023-04-18

## 2023-04-18 VITALS — BODY MASS INDEX: 33.77 KG/M2 | WEIGHT: 228 LBS | HEIGHT: 69 IN

## 2023-04-18 DIAGNOSIS — M25.561 ACUTE PAIN OF RIGHT KNEE: ICD-10-CM

## 2023-04-18 DIAGNOSIS — M17.11 PRIMARY OSTEOARTHRITIS OF RIGHT KNEE: Primary | ICD-10-CM

## 2023-04-18 RX ORDER — LIDOCAINE HYDROCHLORIDE 10 MG/ML
3 INJECTION, SOLUTION EPIDURAL; INFILTRATION; INTRACAUDAL; PERINEURAL ONCE
Status: COMPLETED | OUTPATIENT
Start: 2023-04-18 | End: 2023-04-18

## 2023-04-18 RX ADMIN — LIDOCAINE HYDROCHLORIDE 3 ML: 10 INJECTION, SOLUTION EPIDURAL; INFILTRATION; INTRACAUDAL; PERINEURAL at 14:00

## 2023-04-26 NOTE — PROGRESS NOTES
Chief Complaint    Knee Pain (Synvisc one injection)      History of Present Illness:  Yaneth Craft is a 66 y.o. female who presents for a visco supplementation injection. This patient is being treated conservatively for the arthritis in her right knee. This treatment includes; rest, ice, elevation, bracing, home exercises, activity modification, NSAIDs as needed, corticosteroid injections and visco supplementation injections. The patient denies any new injury. The patient denies any numbness, paresthesias, or weakness. Physical exam:  Inspection: Both knees without erythema, ecchymosis, discoloration, abrasion, contusions, deformity or signs of infection. Palpation:  There is tenderness to palpation to the joint line of the right knee. There is patellofemoral crepitus palpable in the right knee. No tenderness to palpation to any other osseous or soft tissue structures of the knee. Range of motion: Grossly intact  Neurovascular: Patient is neurovascularly intact, equally bilaterally. 2+ dorsal pedal pulses. Procedures: Patient provided medication    VISCO SUPPLEMENTATION  INJECTION:  Right  KNEE    PROCEDURE: Risks, benefits, and alternatives to the injections were discussed in detail with the patient. The risks discussed included but are not limited to infection, skin reactions, hot swollen joints, and anaphylaxis. After informed consent was provided, the patient sat on the exam table. The anterior lateral aspect of the right knee was prepped with Chlora-prep. The skin and subcutaneous tissues were anesthetized with ethyl chloride spray and 1% lidocaine injected with a 20-gauge needle. The needle was left in place and the syringe was detached from the needle. The Synvisc One syringe was attached to the 20-gauge needle and 48 mg of the Synvisc One was injected into the knee without resistance. The needle was withdrawn and the puncture site sealed with a Band-Aid.  The patient tolerated

## 2023-05-08 ENCOUNTER — APPOINTMENT (OUTPATIENT)
Dept: PHYSICAL THERAPY | Age: 58
End: 2023-05-08
Payer: COMMERCIAL

## 2023-05-17 ENCOUNTER — HOSPITAL ENCOUNTER (OUTPATIENT)
Dept: PHYSICAL THERAPY | Age: 58
Setting detail: THERAPIES SERIES
Discharge: HOME | End: 2023-05-17
Payer: COMMERCIAL

## 2023-05-17 PROCEDURE — 97110 THERAPEUTIC EXERCISES: CPT | Performed by: PHYSICAL THERAPIST

## 2023-05-17 NOTE — FLOWSHEET NOTE
[facility] and [address],  [facility], Formerly [facility], [facility] [address]  [address] [facility],5[address] Floor   Christopher Min  Phone: [phone]  Fax: [phone]      Physical Therapy Daily Treatment Note  Date:  2023    Patient Name:  Lisseth Baum    :  1965  MRN: [id number]  Restrictions/Precautions:    Medical/Treatment Diagnosis Information:  Primary osteoarthritis of right knee [M17.11]  Treatment Diagnosis: M25.561 R Knee Pain, R53.1 weakness  Insurance/Certification information:  PT Insurance Information: BCBS 30 VPCY hard max, no auth, no copay  Physician Information:  Ramona Bragg PA-C    Has the plan of care been signed (Y/N):        [x]  Yes  []  No     Date of Patient follow up with Physician: PRN      Is this a Progress Report:     []  Yes  [x]  No        If Yes:  Date Range for reporting period:  Beginnin23  Ending:     Progress report will be due (10 Rx or 30 days whichever is less): 3/82/95       Recertification will be due (POC Duration  / 90 days whichever is less): 23         Visit # Insurance Allowable Auth Required   2 30 hard max []  Yes [x]  No          OUTCOME MEASURE DATE SCORE   LEFS 23 52% deficit            Latex Allergy:  [x]NO      []YES  Preferred Language for Healthcare:   [x]English       []other:    Pain level:  0-8/10     SUBJECTIVE:  Pt doing a bit better but still has trouble with prolonged sitting feeling stiff and sore. Does get up from her desk hourly and walks more at lunch. Has been riding bike 10 min/day and staying consistent with HEP thus far. It's getting easier and she is not having any issues with what was provided at last visit. Does have access to [facility] and interested in going there for additional strengthening.      OBJECTIVE:   Flexibility L R Comment   Hamstring -15 -30 90/90   Gastroc WNL WNL     ITB WNL WNL Ant hip tightness    Quad 110 deg KF 95 deg KF Prone KF

## 2023-05-18 ENCOUNTER — OFFICE VISIT (OUTPATIENT)
Dept: SURGERY | Age: 58
End: 2023-05-18
Payer: COMMERCIAL

## 2023-05-18 ENCOUNTER — HOSPITAL ENCOUNTER (OUTPATIENT)
Dept: MAMMOGRAPHY | Age: 58
Discharge: HOME | End: 2023-05-18
Payer: COMMERCIAL

## 2023-05-18 ENCOUNTER — HOSPITAL ENCOUNTER (OUTPATIENT)
Dept: ULTRASOUND IMAGING | Age: 58
Discharge: HOME | End: 2023-05-18
Payer: COMMERCIAL

## 2023-05-18 VITALS
OXYGEN SATURATION: 98 % | WEIGHT: 230.6 LBS | BODY MASS INDEX: 34.16 KG/M2 | DIASTOLIC BLOOD PRESSURE: 70 MMHG | HEIGHT: 69 IN | HEART RATE: 69 BPM | RESPIRATION RATE: 18 BRPM | SYSTOLIC BLOOD PRESSURE: 124 MMHG

## 2023-05-18 VITALS — WEIGHT: 225 LBS | BODY MASS INDEX: 33.33 KG/M2 | HEIGHT: 69 IN

## 2023-05-18 DIAGNOSIS — Z80.3 FAMILY HISTORY OF BREAST CANCER: ICD-10-CM

## 2023-05-18 DIAGNOSIS — N60.02 BILATERAL BREAST CYSTS: ICD-10-CM

## 2023-05-18 DIAGNOSIS — N63.10 MASS OF RIGHT BREAST, UNSPECIFIED QUADRANT: ICD-10-CM

## 2023-05-18 DIAGNOSIS — R92.8 ABNORMAL FINDING ON BREAST IMAGING: ICD-10-CM

## 2023-05-18 DIAGNOSIS — R92.8 ABNORMAL MAMMOGRAM: ICD-10-CM

## 2023-05-18 DIAGNOSIS — N60.01 BILATERAL BREAST CYSTS: ICD-10-CM

## 2023-05-18 DIAGNOSIS — R92.8 ABNORMAL FINDING ON BREAST IMAGING: Primary | ICD-10-CM

## 2023-05-18 PROCEDURE — 76642 ULTRASOUND BREAST LIMITED: CPT

## 2023-05-18 PROCEDURE — 99213 OFFICE O/P EST LOW 20 MIN: CPT | Performed by: NURSE PRACTITIONER

## 2023-05-18 PROCEDURE — G0279 TOMOSYNTHESIS, MAMMO: HCPCS

## 2023-05-18 PROCEDURE — 3074F SYST BP LT 130 MM HG: CPT | Performed by: NURSE PRACTITIONER

## 2023-05-18 PROCEDURE — 3078F DIAST BP <80 MM HG: CPT | Performed by: NURSE PRACTITIONER

## 2023-05-18 NOTE — PROGRESS NOTES
10/04/2018       Social History     Tobacco Use    Smoking status: Never    Smokeless tobacco: Never   Vaping Use    Vaping Use: Never used   Substance Use Topics    Alcohol use: Yes     Alcohol/week: 7.0 standard drinks     Types: 7 Glasses of wine per week    Drug use: No       Menstrual History:  Menarche age: 17  . Age first live birth: 23  Breastfeed: 5-6 months in total  Postmenopausal  Natural menopause at 48       Oral contraceptive use: yes for about 4-5 years in total  Hormone replacement therapy use: denies     Breast density: Heterogeneously dense scattered fibroglandular density entirely fatty extremely dense   Ashkenazi [employer] Heritage: no   Genetic testing: none      Family history significant for breast and ovarian cancer:   Father Renal Cell Carcinoma age of dx 50,  at 68    [unfilled]  Medication documentation has been reviewed in the electronic medical record and patient office intake form. REVIEW OF SYSTEMS:  Constitutional: Negative for unexpected weight change. Eyes: Negative for visual disturbance. Respiratory: Negative for cough and shortness of breath. Cardiovascular: Negative for chest pain. Gastrointestinal: Negative for abdominal pain. Musculoskeletal: Negative for arthralgias and myalgias. Neurological: Negative for headaches. Hematological: Negative for adenopathy. Psychiatric/Behavioral: Negative for dysphoric mood. The patient is not nervous/anxious. EXAM:  /70   Pulse 69   Resp 18   Ht 5' 9\" (1.753 m)   Wt 230 lb 9.6 oz (104.6 kg)   SpO2 98%   BMI 34.05 kg/m²   Physical Exam  Constitutional: She appearswell-nourished. No apparent distress. Breast: The patient was examined in the upright and supine position. She has a \"DD cup breast. Breasts are symmetrically ptotic. Right: subcentimeter mass at 12:00, 2cmFN, round firm mobile. No other masses or changes in breast contour.   No skin changes of the breast or nipple

## 2023-07-11 ENCOUNTER — TELEPHONE (OUTPATIENT)
Dept: PHARMACY | Age: 58
End: 2023-07-11

## 2023-08-10 ENCOUNTER — TELEPHONE (OUTPATIENT)
Dept: FAMILY MEDICINE CLINIC | Age: 58
End: 2023-08-10

## 2023-08-10 NOTE — TELEPHONE ENCOUNTER
Patient requesting to be worked in this week; trigger point injections.        Appointment Request From: Arnoldo Burton    With Provider: Mery Middleton MD [address]    Preferred Date Range: 8/10/2023 - 8/11/2023    Preferred Times: Any Time    Reason for visit: Request an Appointment    Comments:  Need trigger point injection

## 2023-08-11 ENCOUNTER — OFFICE VISIT (OUTPATIENT)
Dept: FAMILY MEDICINE CLINIC | Age: 58
End: 2023-08-11
Payer: COMMERCIAL

## 2023-08-11 VITALS
HEART RATE: 76 BPM | RESPIRATION RATE: 16 BRPM | SYSTOLIC BLOOD PRESSURE: 118 MMHG | WEIGHT: 229.4 LBS | OXYGEN SATURATION: 99 % | DIASTOLIC BLOOD PRESSURE: 62 MMHG | BODY MASS INDEX: 33.88 KG/M2 | TEMPERATURE: 98.7 F

## 2023-08-11 DIAGNOSIS — I10 ESSENTIAL HYPERTENSION: ICD-10-CM

## 2023-08-11 DIAGNOSIS — F41.8 DEPRESSION WITH ANXIETY: ICD-10-CM

## 2023-08-11 DIAGNOSIS — N62 MACROMASTIA: ICD-10-CM

## 2023-08-11 DIAGNOSIS — M05.79 RHEUMATOID ARTHRITIS INVOLVING MULTIPLE SITES WITH POSITIVE RHEUMATOID FACTOR (HCC): Primary | ICD-10-CM

## 2023-08-11 DIAGNOSIS — M25.511 TRIGGER POINT OF SHOULDER REGION, RIGHT: ICD-10-CM

## 2023-08-11 DIAGNOSIS — R63.5 WEIGHT GAIN: ICD-10-CM

## 2023-08-11 PROCEDURE — 3078F DIAST BP <80 MM HG: CPT | Performed by: FAMILY MEDICINE

## 2023-08-11 PROCEDURE — 99214 OFFICE O/P EST MOD 30 MIN: CPT | Performed by: FAMILY MEDICINE

## 2023-08-11 PROCEDURE — 3074F SYST BP LT 130 MM HG: CPT | Performed by: FAMILY MEDICINE

## 2023-08-11 RX ORDER — TIZANIDINE 4 MG/1
4 TABLET ORAL EVERY 8 HOURS PRN
Qty: 30 TABLET | Refills: 2 | Status: SHIPPED | OUTPATIENT
Start: 2023-08-11

## 2023-08-17 NOTE — PROGRESS NOTES
[facility]    CC: Symptomatic macromastia    REFERRING PHYSICIAN: Komal Curtis MD    HPI: This is a 66 y.o.  female who presents to clinic with desire for breast reduction. Her pertinent breast history include the following: Patient states she has persistent rashes under both breast bilateral in which she treats with anti fungal powder.      Last Mammogram: reviewed mammogram done 23    Current bra size: 40 DDD  Desired bra size: 38 D  Pregnancies/miscarriages: 2/0  Breast feeding: no future plans    Breast Symptoms:    Macromastia Symptoms:  Upper back pain: Yes      Bra strap grooves: Yes      Wears supportive bras (>1 yr): Yes      Tried conservative measures (PT, MDs,etc): Yes, PCP back pain, chiropractors, cervical laminectomy       Intertrigo: Yes      Head/neck pain: Yes      Headaches: Yes      Paresthesias of hands/fingers: Yes      PMHx:   Past Medical History:   Diagnosis Date    Abnormal Pap smear of cervix     Autoimmune disorder ([address])     Breast disorder     DDD (degenerative disc disease), cervical     resolved s/p surgery    Depression with anxiety     Endometriosis     Essential hypertension 2019    GERD (gastroesophageal reflux disease)     Hemangioma of liver 2022    Hypercholesterolemia     Hyperglycemia     Menopause ovarian failure     Migraine     PONV (postoperative nausea and vomiting)     with general anesthesia    Spastic colon      PSHx:   Past Surgical History:   Procedure Laterality Date    APPENDECTOMY      CERVICAL LAMINECTOMY      CHOLECYSTECTOMY      COLONOSCOPY      COLONOSCOPY  2022    COLONOSCOPY POLYPECTOMY SNARE/COLD BIOPSY performed by Akil Negron MD at [facility]      d/t bleeding    LAPAROSCOPY      x3 for endometriosis    UPPER GASTROINTESTINAL ENDOSCOPY N/A 10/29/2020    EGD BIOPSY performed by Akil Negron MD at [address] [place] [address]

## 2023-08-18 ENCOUNTER — PATIENT MESSAGE (OUTPATIENT)
Dept: FAMILY MEDICINE CLINIC | Age: 58
End: 2023-08-18

## 2023-08-18 ENCOUNTER — OFFICE VISIT (OUTPATIENT)
Dept: SURGERY | Age: 58
End: 2023-08-18
Payer: COMMERCIAL

## 2023-08-18 VITALS
HEIGHT: 69 IN | SYSTOLIC BLOOD PRESSURE: 143 MMHG | WEIGHT: 229 LBS | OXYGEN SATURATION: 98 % | BODY MASS INDEX: 33.92 KG/M2 | TEMPERATURE: 97.8 F | DIASTOLIC BLOOD PRESSURE: 73 MMHG

## 2023-08-18 DIAGNOSIS — Z78.0 POSTMENOPAUSAL: Primary | ICD-10-CM

## 2023-08-18 DIAGNOSIS — N62 MACROMASTIA: Primary | ICD-10-CM

## 2023-08-18 DIAGNOSIS — M05.79 RHEUMATOID ARTHRITIS INVOLVING MULTIPLE SITES WITH POSITIVE RHEUMATOID FACTOR (HCC): ICD-10-CM

## 2023-08-18 PROCEDURE — 99204 OFFICE O/P NEW MOD 45 MIN: CPT

## 2023-08-18 PROCEDURE — 3077F SYST BP >= 140 MM HG: CPT

## 2023-08-18 PROCEDURE — 3078F DIAST BP <80 MM HG: CPT

## 2023-08-18 NOTE — TELEPHONE ENCOUNTER
From: Tejas Or  To: Dr. Pita Diehl: 8/18/2023 10:59 AM EDT  Subject: Hanna Zamarripa,  My good friend who is a NP, suggested that I ask you about a dexascan. She said that I should have one since I have RA. Is that correct?     Sarkis Dillon

## 2023-08-23 ENCOUNTER — TELEPHONE (OUTPATIENT)
Dept: SURGERY | Age: 58
End: 2023-08-23

## 2023-08-23 NOTE — TELEPHONE ENCOUNTER
The patient was in the office to see Mindy [phone]. PLAN: Would benefit from bilateral breast reduction. Will submit to insurance and work to schedule. I received a surgery letter. I lmom for the patient at the home number listed. I requested a call backt o discuss needed medical records. I will leave this phone note open.

## 2023-08-24 NOTE — TELEPHONE ENCOUNTER
I spoke with the patient at the home number listed. She is aware that I will view Epic records from Dr. César Colindres and Trevor Mata and will submit anything helpful. The patient stated that she does not have any documentation in regards to rashes. She treated herself. The patient stated that any PT records would likely be 11years old, so I did not request them. The patient has been provided an insurance update. I spoke with Marge Cannon at [facility] ([phone]) to see if CPT Code 72097 requires pre certification. The code does require pre certification, which I started during our call. I will fax clinicals and colored pictures to [phone]. I will scan the information that I faxed and the fax success into Epic under the media tab, but I am not able to scan colored pictures. Pending Case # [id number]     Date Range 8- to 12-    I will leave this phone note open.

## 2023-08-30 ENCOUNTER — TELEPHONE (OUTPATIENT)
Dept: ORTHOPEDIC SURGERY | Age: 58
End: 2023-08-30

## 2023-08-30 NOTE — TELEPHONE ENCOUNTER
General Question     Subject: AUTHORIZATION OUTSTANDING ON 04/18 [facility]   Patient and /or Facility Request: Shreya Evangelina  Contact Number: [phone]     [address] [place] [address] ON INJECTION AND MED RECORDS HAVEN'T BEEN RCV'D BY INSURANCE, SO THE CLAIM IS OUTSTANDING. PLEASE SUBMIT CORRECT AUTH # AND OV NOTES SO THE INSURANCE WILL BE ABLE TO COMPLETE CLAIM. PLEASE CALL Pt WITH ANY QUESTIONS AND TO LET HER KNOW WHEN THE PAPERWORK IS COMPLETED  SO SHE CAN F/U WITH INSURANCE.

## 2023-09-06 ENCOUNTER — TELEPHONE (OUTPATIENT)
Dept: FAMILY MEDICINE CLINIC | Age: 58
End: 2023-09-06

## 2023-09-06 NOTE — TELEPHONE ENCOUNTER
Patient is having lower back pain and would like to know how to treat it. She has been using ibuprofen, ice, and stretches but has not had any relief. Dino Fuentes wants to know if she should come in for some trigger point injections or see one of the NPs . Please call patient concerning this matter.     [phone] (home) [phone] (work)

## 2023-09-07 ENCOUNTER — OFFICE VISIT (OUTPATIENT)
Dept: FAMILY MEDICINE CLINIC | Age: 58
End: 2023-09-07
Payer: COMMERCIAL

## 2023-09-07 VITALS
OXYGEN SATURATION: 98 % | SYSTOLIC BLOOD PRESSURE: 128 MMHG | TEMPERATURE: 98.5 F | HEART RATE: 62 BPM | HEIGHT: 69 IN | DIASTOLIC BLOOD PRESSURE: 72 MMHG | BODY MASS INDEX: 33.92 KG/M2 | WEIGHT: 229 LBS

## 2023-09-07 DIAGNOSIS — S39.012A STRAIN OF LUMBAR REGION, INITIAL ENCOUNTER: ICD-10-CM

## 2023-09-07 PROCEDURE — 99214 OFFICE O/P EST MOD 30 MIN: CPT | Performed by: NURSE PRACTITIONER

## 2023-09-07 PROCEDURE — 3078F DIAST BP <80 MM HG: CPT | Performed by: NURSE PRACTITIONER

## 2023-09-07 PROCEDURE — 3074F SYST BP LT 130 MM HG: CPT | Performed by: NURSE PRACTITIONER

## 2023-09-07 PROCEDURE — 96372 THER/PROPH/DIAG INJ SC/IM: CPT | Performed by: NURSE PRACTITIONER

## 2023-09-07 RX ORDER — KETOROLAC TROMETHAMINE 30 MG/ML
60 INJECTION, SOLUTION INTRAMUSCULAR; INTRAVENOUS ONCE
Status: COMPLETED | OUTPATIENT
Start: 2023-09-07 | End: 2023-09-07

## 2023-09-07 RX ADMIN — KETOROLAC TROMETHAMINE 60 MG: 30 INJECTION, SOLUTION INTRAMUSCULAR; INTRAVENOUS at 16:06

## 2023-09-07 ASSESSMENT — ENCOUNTER SYMPTOMS
SINUS PAIN: 0
COLOR CHANGE: 0
BACK PAIN: 1
ABDOMINAL PAIN: 0
WHEEZING: 0
SINUS PRESSURE: 0
SHORTNESS OF BREATH: 0
DIARRHEA: 0
CONSTIPATION: 0
COUGH: 0

## 2023-09-07 NOTE — TELEPHONE ENCOUNTER
I received a fax from [address] [place] [address] dated 9-7-2023. I will scan the fax into Epic under the media tab. DENIED  Reference Number [id number]  CPT Code 27357    Based on your height and weight about 880 grams of tissue would need to be removed from at least one breast. Your doctor plans to remove only about 620 grams. We also need to to see that the problems did not go away with treatment for at least three months. I spoke with the patient at the home number listed to discuss the DENIAL received. She is under the impression after talking to Megan that if we make the adjustment in grams that surgery will be approved and she will not need any additional documentation. She is aware that I will discuss the case with Dr. Deborah Suggs on Monday and will let her know how he will move forward. I will leave this phone note open.

## 2023-09-07 NOTE — ASSESSMENT & PLAN NOTE
Continue NSAIDs and muscle relaxer  We will give injection of Toradol today  Recommend topicals, heat, Lidoderm  Provided with stretches and exercises  Call if no better

## 2023-09-07 NOTE — TELEPHONE ENCOUNTER
Patient called to update Yelitza:    She said Megan informed her that they are denying the surgery because they said more needs to be taken off for approval.    The patient said she is OK with them increasing the amount in order for the surgery to be approved. They also let her know that a fax would be sent over with details.      Patient can be reached at [phone]

## 2023-09-11 NOTE — TELEPHONE ENCOUNTER
I lmom on the peer to peer line at [facility] ([phone]) to schedule a peer to peer for Reference Number [id number]. I left 3 dates and times that Dr. Keyshawn Carmona will be available. The peer to peer paperwork is in my drawer. I spoke with the patient at the home number listed to provide an insurance update. I will leave this phone note open.

## 2023-09-14 ENCOUNTER — HOSPITAL ENCOUNTER (OUTPATIENT)
Dept: WOMENS IMAGING | Age: 58
Discharge: HOME | End: 2023-09-14
Payer: COMMERCIAL

## 2023-09-14 DIAGNOSIS — M05.79 RHEUMATOID ARTHRITIS INVOLVING MULTIPLE SITES WITH POSITIVE RHEUMATOID FACTOR (HCC): ICD-10-CM

## 2023-09-14 DIAGNOSIS — Z78.0 POSTMENOPAUSAL: ICD-10-CM

## 2023-09-14 PROCEDURE — 77080 DXA BONE DENSITY AXIAL: CPT

## 2023-09-18 NOTE — TELEPHONE ENCOUNTER
Dr. Rossy Valentine had a peer to peer with Dr. Kimberly Villalobos ([phone]) today. The case remains DENIED, since Dr. Rossy Valentine will not agree to remove 880 grams of tissue. In addition, we do not have treatment records on file. I spoke with the patient at the home number listed to discuss the [address]. The patient is aware that [place] did not budge from the 880 grams. The patient is aware that she can come into the office and see Dr. Rossy Valentine for further discussion if she would like. The patient will think about it and let us know. I will remove the surgery letter from the letter queue. I will close this phone note.

## 2023-09-25 RX ORDER — ROSUVASTATIN CALCIUM 10 MG/1
TABLET, COATED ORAL
Qty: 90 TABLET | Refills: 1 | Status: SHIPPED | OUTPATIENT
Start: 2023-09-25

## 2023-09-25 RX ORDER — TRIAMTERENE AND HYDROCHLOROTHIAZIDE 75; 50 MG/1; MG/1
TABLET ORAL
Qty: 90 TABLET | Refills: 1 | Status: SHIPPED | OUTPATIENT
Start: 2023-09-25

## 2023-09-25 NOTE — TELEPHONE ENCOUNTER
Requested Prescriptions     Pending Prescriptions Disp Refills    triamterene-hydroCHLOROthiazide (MAXZIDE) 75-50 MG per tablet [Pharmacy Med Name: TRIAMTERENE-HCTZ 75-50MG TABS] 90 tablet 1     Sig: TAKE ONE TABLET BY MOUTH EVERY DAY    rosuvastatin (CRESTOR) 10 MG tablet [Pharmacy Med Name: ROSUVASTATIN CALCIUM 10MG TABS] 90 tablet 1     Sig: TAKE ONE TABLET BY MOUTH EVERY NIGHT          Last Office Visit: 9/7/2023     Next Office Visit: Visit date not found     Last Labs: 3/22/2023

## 2023-10-02 ENCOUNTER — TELEPHONE (OUTPATIENT)
Dept: ORTHOPEDIC SURGERY | Age: 58
End: 2023-10-02

## 2023-10-02 NOTE — TELEPHONE ENCOUNTER
General Question     Subject: OPTIONS-INJECTION OR OTHER  Patient and /or Facility Request: [facility]  Contact Number: [phone]    PATIENT CALLED TO SPEAK WITH CLINICAL TO INQUIRE OF OPTIONS REGARDING INJECTION. SHE STATED THAT SHE IS INTERESTED WITH ANOTHER VISCO INJ, KNEE PAIN IS BOTHERING HER AND WAKING HER UP AT NIGHT OVER COURSE OF LAST MONTH. PT WOULD LIKE TO SEE IF SHE COULD GET ANOTHER INJECTION OR GO OVER ADDITIONAL OPTIONS.      PLEASE ADVISE

## 2023-10-04 DIAGNOSIS — M17.11 PRIMARY OSTEOARTHRITIS OF RIGHT KNEE: Primary | ICD-10-CM

## 2023-10-04 DIAGNOSIS — M25.561 ACUTE PAIN OF RIGHT KNEE: ICD-10-CM

## 2023-10-04 NOTE — TELEPHONE ENCOUNTER
Patient call back she just need to know if she need to have a prior authorization for the gel injections before we schedule her and appt or can we schedule her anyway after 10/18/23. She just need a call back regarding this matter. Please Advise.

## 2023-10-17 ENCOUNTER — TELEPHONE (OUTPATIENT)
Dept: ORTHOPEDIC SURGERY | Age: 58
End: 2023-10-17

## 2023-10-17 NOTE — TELEPHONE ENCOUNTER
General Question     Subject: AUTHORIZATION   Patient and /or Facility Request: Dylon Guillermo Number: [phone]    PATIENT STATES THAT HER INSURANCE NEEDS AUTHORIZATION FOR THE SYNVISC INJECTION IN THE RIGHT KNEE    PLEASE ADVISE

## 2023-10-17 NOTE — TELEPHONE ENCOUNTER
SYNVISC-ONE (SINGLE) RIGHT KNEE. DRUG REQUEST IS AUTHORIZED ON THE PHARMACY BENEFIT FOR THIS [facility]BER [facility] ([facility]). PATIENT INFORMATION AND VERBAL SCRIPT GIVEN TO ASHLEY AT [address] [place] [address] ON 10/6/23 . (P: [phone])    THEY WILL CONTACT PATIENT FOR CONSENT AND COPAY THEN CALL TO SCHEDULE DELIVERY. DRUG WILL BE DELIVERED TO THE [facility] OFFICE. PENDING PATIENT REPLY.

## 2023-10-19 ENCOUNTER — OFFICE VISIT (OUTPATIENT)
Dept: ORTHOPEDIC SURGERY | Age: 58
End: 2023-10-19

## 2023-10-19 VITALS — BODY MASS INDEX: 33.92 KG/M2 | WEIGHT: 229 LBS | HEIGHT: 69 IN

## 2023-10-19 DIAGNOSIS — M25.561 ACUTE PAIN OF RIGHT KNEE: ICD-10-CM

## 2023-10-19 DIAGNOSIS — M17.11 PRIMARY OSTEOARTHRITIS OF RIGHT KNEE: Primary | ICD-10-CM

## 2023-10-19 RX ORDER — LIDOCAINE HYDROCHLORIDE 10 MG/ML
3 INJECTION, SOLUTION EPIDURAL; INFILTRATION; INTRACAUDAL; PERINEURAL ONCE
Status: COMPLETED | OUTPATIENT
Start: 2023-10-19 | End: 2023-10-19

## 2023-10-19 RX ADMIN — LIDOCAINE HYDROCHLORIDE 3 ML: 10 INJECTION, SOLUTION EPIDURAL; INFILTRATION; INTRACAUDAL; PERINEURAL at 10:39

## 2023-10-19 NOTE — PROGRESS NOTES
Chief Complaint    Knee Pain (Right knee synvisc)      History of Present Illness:  Maranda Roman is a 66 y.o. female who presents for a visco supplementation injection. This patient is being treated conservatively for the arthritis in her right knee. This treatment includes; rest, ice, elevation, bracing, home exercises, activity modification, NSAIDs as needed, corticosteroid injections and visco supplementation injections. She rates her pain 2/10, dull achy and sharp. She finds that her knee gives out on stairs. The patient denies any new injury. The patient denies any numbness, paresthesias, or weakness. The patient denies any new injury. The patient denies any numbness, paresthesias, or weakness. Physical exam:  Inspection: Both knees without erythema, ecchymosis, discoloration, abrasion, contusions, deformity or signs of infection. Palpation: There is tenderness to palpation to the joint line of the right knee. There is patellofemoral crepitus palpable in both knees. No tenderness to palpation to any other osseous or soft tissue structures of the knee. Range of motion: Grossly intact  Neurovascular: Patient is neurovascularly intact, equally bilaterally. 2+ dorsal pedal pulses. Procedures:    VISCO SUPPLEMENTATION  INJECTION:  Right KNEE    PROCEDURE: Risks, benefits, and alternatives to the injections were discussed in detail with the patient. The risks discussed included but are not limited to infection, skin reactions, hot swollen joints, and anaphylaxis. After informed consent was provided, the patient sat on the exam table. The anterior lateral aspect of the right knee was prepped with Chlora-prep. The skin and subcutaneous tissues were anesthetized with ethyl chloride spray and 1% lidocaine injected with a 20-gauge needle. The needle was left in place and the syringe was detached from the needle.   The Synvisc One syringe was attached to the 20-gauge needle and 48 mg of the

## 2023-10-25 ENCOUNTER — TELEPHONE (OUTPATIENT)
Dept: ORTHOPEDIC SURGERY | Age: 58
End: 2023-10-25

## 2023-10-27 ENCOUNTER — TELEPHONE (OUTPATIENT)
Dept: ORTHOPEDIC SURGERY | Age: 58
End: 2023-10-27

## 2023-10-27 NOTE — TELEPHONE ENCOUNTER
Attempted to call. I will be at [facility] office Monday and we can get her a Economy hinge brace to help with her stability. Patient will be contact Monday with a time to come into office so we can fit her.
General Question     Subject: POST INJ KNEE GIVING OUT - BRACE? Patient and /or Facility Request: Raleigh Lees  Contact Number: [phone]     IS THERE ANY BRACE SHE MIGHT USE TO STABALIZE THE KNEE? Pt IS WORRIED THAT SOME DAMAGE MAY BE DONE WITHOUT ASSISTANCE. PLEASE CALL ASAP.
General Question     Subject: REQUESTING A BRACE FOR HER KNEE. KRISTIAN  Patient: Barrett Ryan  Contact Number:  [phone]
Admit to NICU  Vitals as per protocol

## 2023-10-27 NOTE — TELEPHONE ENCOUNTER
Spoke with patient regarding brace, she will be in at the [facility] office around 8:30am Monday the 30th for me to fit her with an economy hinge brace.

## 2023-10-30 DIAGNOSIS — M25.561 ACUTE PAIN OF RIGHT KNEE: ICD-10-CM

## 2023-10-30 DIAGNOSIS — M17.11 PRIMARY OSTEOARTHRITIS OF RIGHT KNEE: Primary | ICD-10-CM

## 2023-11-10 ENCOUNTER — PATIENT MESSAGE (OUTPATIENT)
Dept: FAMILY MEDICINE CLINIC | Age: 58
End: 2023-11-10

## 2023-11-10 NOTE — TELEPHONE ENCOUNTER
From: Balaji Steinberg  To: Dr. Breann Barbour: 11/10/2023 10:58 AM EST  Subject: Med request    Hi Dr. Marisel Boston,  I am feeling overwhelmed due to work and personal issues. I would appreciate a refill on Ativan if possible.  [place] [phone]

## 2023-11-11 NOTE — TELEPHONE ENCOUNTER
She needs visit as all refills of controlled substances require a visit every 3months  State rules/guidelines  Can be a 10 min VV

## 2023-11-21 ENCOUNTER — PROCEDURE VISIT (OUTPATIENT)
Dept: SPEECH THERAPY | Age: 58
End: 2023-11-21

## 2023-11-21 DIAGNOSIS — R49.0 HOARSENESS: Primary | ICD-10-CM

## 2023-11-21 DIAGNOSIS — R09.89 THROAT CLEARING: ICD-10-CM

## 2023-11-21 DIAGNOSIS — M06.9 RHEUMATOID ARTHRITIS, INVOLVING UNSPECIFIED SITE, UNSPECIFIED WHETHER RHEUMATOID FACTOR PRESENT (HCC): ICD-10-CM

## 2023-11-21 PROCEDURE — NBSRV NON-BILLABLE SERVICE: Performed by: SPEECH-LANGUAGE PATHOLOGIST

## 2023-11-21 NOTE — PROGRESS NOTES
Pt w/ incidental finding during training for endoscopy 10/17/23; observed subglottic erythema/edema on anterior tracheal wall. Reviewed by in-office ENT, Dr. Alicia Davis, w/ suspected etiology related to dx of RA. Recommendation for consistent completion of monthly therapeutic infusion for systemic management and repeat endoscopy. 10/17                 10/17      Pt returns today (11/21/23) for one-month repeat endoscopy; new onset throat clearing, worsening RICK (snoring), and frequent expectoration of thick sputum. Endoscopy revealed smooth, yellow crusting noted on medial-R tracheal wall, possible collection of thick sputum vs enlarged area of edematous tissue; reduced erythema/edema on L. Ongoing recommendation for repeat endoscopy to monitor changes should further medical management be required.         11/21 11/21      Thank you,    Amanda Strong [place]; [id number]  Speech-Language Pathologist

## 2023-12-07 ENCOUNTER — HOSPITAL ENCOUNTER (OUTPATIENT)
Dept: ULTRASOUND IMAGING | Age: 58
Discharge: HOME | End: 2023-12-07
Payer: COMMERCIAL

## 2023-12-07 ENCOUNTER — OFFICE VISIT (OUTPATIENT)
Dept: SURGERY | Age: 58
End: 2023-12-07
Payer: COMMERCIAL

## 2023-12-07 VITALS
SYSTOLIC BLOOD PRESSURE: 126 MMHG | OXYGEN SATURATION: 100 % | HEART RATE: 82 BPM | RESPIRATION RATE: 18 BRPM | DIASTOLIC BLOOD PRESSURE: 78 MMHG | BODY MASS INDEX: 34.93 KG/M2 | WEIGHT: 235.8 LBS | HEIGHT: 69 IN

## 2023-12-07 DIAGNOSIS — Z80.3 FAMILY HISTORY OF BREAST CANCER: ICD-10-CM

## 2023-12-07 DIAGNOSIS — N60.02 BILATERAL BREAST CYSTS: ICD-10-CM

## 2023-12-07 DIAGNOSIS — N63.10 MASS OF RIGHT BREAST, UNSPECIFIED QUADRANT: ICD-10-CM

## 2023-12-07 DIAGNOSIS — R92.8 ABNORMAL FINDING ON BREAST IMAGING: Primary | ICD-10-CM

## 2023-12-07 DIAGNOSIS — N60.01 BILATERAL BREAST CYSTS: ICD-10-CM

## 2023-12-07 DIAGNOSIS — R92.8 ABNORMAL FINDING ON BREAST IMAGING: ICD-10-CM

## 2023-12-07 PROCEDURE — 3078F DIAST BP <80 MM HG: CPT | Performed by: NURSE PRACTITIONER

## 2023-12-07 PROCEDURE — 99213 OFFICE O/P EST LOW 20 MIN: CPT | Performed by: NURSE PRACTITIONER

## 2023-12-07 PROCEDURE — 3074F SYST BP LT 130 MM HG: CPT | Performed by: NURSE PRACTITIONER

## 2023-12-07 PROCEDURE — 76642 ULTRASOUND BREAST LIMITED: CPT

## 2023-12-07 NOTE — PROGRESS NOTES
Surgical Breast Oncology     Primary Care Provider: Kelton Canela MD    CC: abnormal breast imaging     HPI: Ms. Chasidy Christian is a 66 y.o. F who presents today for follow up abnormal left breast imaging. She reports small lump located above her nipple at 12:00 is still palpable and not changing. Sometimes slightly tender with touch. She has no other new breast related concerns today. She states that she does perform routine self breast evaluations and has not noticed any new abnormalities such as masses, skin changes, color changes,nipple discharge, or changes to the nipple-areolar complex. Mother diagnosed with breast cancer (ER/RI+ HER2 negative) at age 66, is doing well. Her paternal first cousin was also diagnosed with breast cancer 2023. She has no family history of ovarian cancer. C/o shoulder, neck, and back pain from large breasts, interested in reduction, insurance would not approve. Diet: regular  Alcohol: yes socially  Exercise: no  Sleep: 6-8 hours      INTERVAL HISTORY:  Bilateral screening mammogram 1/28/2022:  Scattered areas of fibroglandular density. No new concerning findings suggestive malignancy. BI-RADS 1. Right breast U/S 4/8/2022:  Mildly complex 3 x 4 x 5 mm cystic lesion in the 12:00 retroareolar right breast corresponding to palpable finding on clinical exam.  Findings favor benign etiology. Short interval follow-up is recommended in 6 months. BI-RADS 3. Right breast ultrasound 10/13/2022:  12:00 retroareolar redemonstrates the superficial oval anechoic mass with indistinct margins measuring 0.5 x 0.2 cm. It is unchanged. Demonstrates a 6-month stability and is probably benign.   BI-RADS 3.    Bilateral diagnostic mammogram and right and left breast U/S 5/18/2023:  RIGHT BREAST: No new suspicious masses, microcalcifications or areas of architectural distortion are seen in the right breast.  LEFT BREAST: There is a developing focal asymmetry at

## 2023-12-13 ENCOUNTER — OFFICE VISIT (OUTPATIENT)
Dept: FAMILY MEDICINE CLINIC | Age: 58
End: 2023-12-13
Payer: COMMERCIAL

## 2023-12-13 VITALS
HEART RATE: 98 BPM | SYSTOLIC BLOOD PRESSURE: 136 MMHG | HEIGHT: 69 IN | TEMPERATURE: 98.2 F | OXYGEN SATURATION: 97 % | DIASTOLIC BLOOD PRESSURE: 84 MMHG | WEIGHT: 235 LBS | BODY MASS INDEX: 34.8 KG/M2

## 2023-12-13 DIAGNOSIS — B37.9 CANDIDA INFECTION: Primary | ICD-10-CM

## 2023-12-13 PROCEDURE — 3075F SYST BP GE 130 - 139MM HG: CPT | Performed by: NURSE PRACTITIONER

## 2023-12-13 PROCEDURE — 3079F DIAST BP 80-89 MM HG: CPT | Performed by: NURSE PRACTITIONER

## 2023-12-13 PROCEDURE — 99213 OFFICE O/P EST LOW 20 MIN: CPT | Performed by: NURSE PRACTITIONER

## 2023-12-13 RX ORDER — NYSTATIN 100000 [USP'U]/G
POWDER TOPICAL
Qty: 60 G | Refills: 1 | Status: SHIPPED | OUTPATIENT
Start: 2023-12-13

## 2023-12-13 RX ORDER — FLUCONAZOLE 150 MG/1
150 TABLET ORAL ONCE
Qty: 1 TABLET | Refills: 0 | Status: SHIPPED | OUTPATIENT
Start: 2023-12-13 | End: 2023-12-13

## 2023-12-13 ASSESSMENT — ENCOUNTER SYMPTOMS
SHORTNESS OF BREATH: 0
DIARRHEA: 0
SINUS PAIN: 0
COUGH: 0
CONSTIPATION: 0
SINUS PRESSURE: 0
ABDOMINAL PAIN: 0
COLOR CHANGE: 0
WHEEZING: 0
BACK PAIN: 0
RHINORRHEA: 0

## 2023-12-13 NOTE — PROGRESS NOTES
Tejas Bland (:  1965) is a 66 y.o. female,Established patient, here for evaluation of the following chief complaint(s):  Skin Problem (Left breast, open sore with smell, has been present for ~2 days)      ASSESSMENT/PLAN:  1. Candida infection  Assessment & Plan:  Left breast fold  Nystatin powder x 1 week  Diflucan x 1 dose  Call if no better       No follow-ups on file. SUBJECTIVE/OBJECTIVE:  Skin Problem  This is a new problem. The current episode started in the past 7 days. The affected locations include the torso. The rash is characterized by redness and itchiness. She was exposed to nothing. Pertinent negatives include no congestion, cough, diarrhea, fatigue, fever, rhinorrhea or shortness of breath. Past treatments include topical steroids. The treatment provided no relief. Current Outpatient Medications   Medication Sig Dispense Refill    nystatin (MYCOSTATIN) 940076 UNIT/GM powder Apply 3 times daily.  60 g 1    fluconazole (DIFLUCAN) 150 MG tablet Take 1 tablet by mouth once for 1 dose 1 tablet 0    triamterene-hydroCHLOROthiazide (MAXZIDE) 75-50 MG per tablet TAKE ONE TABLET BY MOUTH EVERY DAY 90 tablet 1    rosuvastatin (CRESTOR) 10 MG tablet TAKE ONE TABLET BY MOUTH EVERY NIGHT 90 tablet 1    Abatacept (ORENCIA IV)       tiZANidine (ZANAFLEX) 4 MG tablet Take 1 tablet by mouth every 8 hours as needed (muscle spasm) 30 tablet 2    traZODone (DESYREL) 50 MG tablet TAKE ONE TO TWO TABLETS BY MOUTH NIGHTLY 180 tablet 3    ondansetron (ZOFRAN) 4 MG tablet Take 1 tablet by mouth daily as needed for Nausea or Vomiting 30 tablet 0    escitalopram (LEXAPRO) 10 MG tablet Take 1 tablet by mouth daily 90 tablet 3    Omega-3 Fatty Acids (FISH OIL) 1000 MG CAPS Take 1 capsule by mouth daily      Multiple Vitamins-Minerals (THERAPEUTIC MULTIVITAMIN-MINERALS) tablet Take 1 tablet by mouth daily      hyoscyamine (ANASPAZ;LEVSIN) 125 MCG tablet Take 1 tablet by mouth every 4 hours as needed for

## 2024-01-27 ENCOUNTER — PATIENT MESSAGE (OUTPATIENT)
Dept: FAMILY MEDICINE CLINIC | Age: 59
End: 2024-01-27

## 2024-01-29 RX ORDER — ESCITALOPRAM OXALATE 10 MG/1
10 TABLET ORAL DAILY
Qty: 90 TABLET | Refills: 3 | Status: SHIPPED | OUTPATIENT
Start: 2024-01-29

## 2024-01-29 RX ORDER — SCOLOPAMINE TRANSDERMAL SYSTEM 1 MG/1
1 PATCH, EXTENDED RELEASE TRANSDERMAL
Qty: 4 PATCH | Refills: 0 | Status: SHIPPED | OUTPATIENT
Start: 2024-01-29

## 2024-01-29 NOTE — TELEPHONE ENCOUNTER
From: Lexie Isaacs  To: Dr. Reilly Canales  Sent: 1/27/2024 7:24 PM EST  Subject: Scopolamine patch     Hi Dr. Canales,  I am going on a cruise 2/22 and will need scopolamine patches. I now have to use [facility] pharmacy at [place]. [phone].    Thank you!

## 2024-02-13 ENCOUNTER — TELEMEDICINE (OUTPATIENT)
Dept: FAMILY MEDICINE CLINIC | Age: 59
End: 2024-02-13
Payer: COMMERCIAL

## 2024-02-13 ENCOUNTER — CLINICAL DOCUMENTATION (OUTPATIENT)
Dept: SPEECH THERAPY | Age: 59
End: 2024-02-13

## 2024-02-13 DIAGNOSIS — K21.9 GASTROESOPHAGEAL REFLUX DISEASE WITHOUT ESOPHAGITIS: ICD-10-CM

## 2024-02-13 DIAGNOSIS — E78.00 HYPERCHOLESTEROLEMIA: ICD-10-CM

## 2024-02-13 DIAGNOSIS — J04.0: ICD-10-CM

## 2024-02-13 DIAGNOSIS — F41.8 DEPRESSION WITH ANXIETY: Primary | ICD-10-CM

## 2024-02-13 DIAGNOSIS — R73.9 HYPERGLYCEMIA: ICD-10-CM

## 2024-02-13 DIAGNOSIS — I10 ESSENTIAL HYPERTENSION: ICD-10-CM

## 2024-02-13 DIAGNOSIS — M05.79 RHEUMATOID ARTHRITIS INVOLVING MULTIPLE SITES WITH POSITIVE RHEUMATOID FACTOR (HCC): ICD-10-CM

## 2024-02-13 DIAGNOSIS — F43.9 STRESS: ICD-10-CM

## 2024-02-13 PROCEDURE — 99214 OFFICE O/P EST MOD 30 MIN: CPT | Performed by: FAMILY MEDICINE

## 2024-02-13 RX ORDER — LORAZEPAM 0.5 MG/1
0.5 TABLET ORAL EVERY 8 HOURS PRN
Qty: 30 TABLET | Refills: 0 | Status: SHIPPED | OUTPATIENT
Start: 2024-02-13 | End: 2024-03-14

## 2024-02-13 RX ORDER — ESCITALOPRAM OXALATE 10 MG/1
15 TABLET ORAL DAILY
Qty: 135 TABLET | Refills: 3
Start: 2024-02-13

## 2024-02-13 NOTE — PROGRESS NOTES
Here for virtual visit and f/u of mood, cholesterol, stress    Pt state that stress levels are high, with significant issues of staffing, as well as issues with URI sx.  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  Pt states that she is going to be on vacation next week.  Pt with some additional stressors in the family, and some issues there.  Pt states that it is adding to the stress.  She has had some issues with her mother-in-law as well.  Pt has been starting with counseling, through [facility] and gets 3 free sessions.  That was helpful for her and does want to continue.  After the 3 sessions, can continue but would need to pay.  Pt taking lexapro 10mg.  Pt fels that lexapro is helpful but with breakthru sx d/t stressors     Pt having issues with getting RA medications approved and was finally able to get approved but has been able to get back on schedule.  Pt following with dr. Helms and remains on orencia.  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  Pt did talk with GI and was prevacid in AM, pepcid in dinner and gaviscon qHS.  Pt not having any throat issues    Pt here for follow up of blood pressure.  Pt states doing great with adherence to therapy and feels well.  No issues of chest pain, shortness of breath.  No vision changes, headache, swelling in legs.    Here for f/u of cholesterol.  Pt as been working on diet/exercise and is consistent with therapy.  No side effects from current therapy.  No chest pain, shortness of breath.  No numbness/tingling in extremities       Except as noted above in the history of present illness, the review of systems is  negative for headache, vision changes, chest pain, shortness of breath, abdominal pain, urinary sx, bowel changes.    Past medical, surgical, and social history reviewed and updated  Medications and allergies

## 2024-02-13 NOTE — PROGRESS NOTES
Repeat endoscopy performed this date to assess subglottic inflammation, suspected d/t hx of RA; persistent erythema w/ thick, yellow crusting on anterior tracheal wall, similar in appearance to last visualization (Nov '23). Area of focal white tissue changes more distal on anterior tracheal wall as well.               Thank you,    Kristin Julio MA (Katie), CCC-SLP; [id number]  Speech-Language Pathologist

## 2024-02-16 DIAGNOSIS — J04.0: ICD-10-CM

## 2024-02-16 DIAGNOSIS — R05.3 CHRONIC COUGH: Primary | ICD-10-CM

## 2024-02-16 RX ORDER — FLUTICASONE PROPIONATE 220 UG/1
2 AEROSOL, METERED RESPIRATORY (INHALATION) 2 TIMES DAILY
Qty: 1 EACH | Refills: 0 | Status: SHIPPED | OUTPATIENT
Start: 2024-02-16 | End: 2025-02-15

## 2024-02-16 NOTE — PROGRESS NOTES
Persistent subglottic inflammation on videostrobscopy despite systemic infusions. Plan for trial of steroid inhaler to see if inflammation improves, if not consider steroid injection

## 2024-03-18 DIAGNOSIS — E78.00 HYPERCHOLESTEROLEMIA: ICD-10-CM

## 2024-03-18 DIAGNOSIS — I10 ESSENTIAL HYPERTENSION: ICD-10-CM

## 2024-03-18 DIAGNOSIS — R73.9 HYPERGLYCEMIA: ICD-10-CM

## 2024-03-19 LAB
ALBUMIN SERPL-MCNC: 4.5 G/DL (ref 3.4–5)
ALBUMIN/GLOB SERPL: 2 {RATIO} (ref 1.1–2.2)
ALP SERPL-CCNC: 83 U/L (ref 40–129)
ALT SERPL-CCNC: 25 U/L (ref 10–40)
ANION GAP SERPL CALCULATED.3IONS-SCNC: 9 MMOL/L (ref 3–16)
AST SERPL-CCNC: 17 U/L (ref 15–37)
BASOPHILS # BLD: 0 K/UL (ref 0–0.2)
BASOPHILS NFR BLD: 0.5 %
BILIRUB SERPL-MCNC: 0.3 MG/DL (ref 0–1)
BUN SERPL-MCNC: 17 MG/DL (ref 7–20)
CALCIUM SERPL-MCNC: 9.6 MG/DL (ref 8.3–10.6)
CHLORIDE SERPL-SCNC: 104 MMOL/L (ref 99–110)
CHOLEST SERPL-MCNC: 173 MG/DL (ref 0–199)
CO2 SERPL-SCNC: 29 MMOL/L (ref 21–32)
CREAT SERPL-MCNC: 1.1 MG/DL (ref 0.6–1.1)
DEPRECATED RDW RBC AUTO: 13 % (ref 12.4–15.4)
EOSINOPHIL # BLD: 0.1 K/UL (ref 0–0.6)
EOSINOPHIL NFR BLD: 1.5 %
EST. AVERAGE GLUCOSE BLD GHB EST-MCNC: 114 MG/DL
GFR SERPLBLD CREATININE-BSD FMLA CKD-EPI: 59 ML/MIN/{1.73_M2}
GLUCOSE SERPL-MCNC: 111 MG/DL (ref 70–99)
HBA1C MFR BLD: 5.6 %
HCT VFR BLD AUTO: 42 % (ref 36–48)
HDLC SERPL-MCNC: 39 MG/DL (ref 40–60)
HGB BLD-MCNC: 14.5 G/DL (ref 12–16)
LDLC SERPL CALC-MCNC: 101 MG/DL
LYMPHOCYTES # BLD: 2.2 K/UL (ref 1–5.1)
LYMPHOCYTES NFR BLD: 32.5 %
MCH RBC QN AUTO: 31.8 PG (ref 26–34)
MCHC RBC AUTO-ENTMCNC: 34.6 G/DL (ref 31–36)
MCV RBC AUTO: 92.1 FL (ref 80–100)
MONOCYTES # BLD: 0.4 K/UL (ref 0–1.3)
MONOCYTES NFR BLD: 6.3 %
NEUTROPHILS # BLD: 4.1 K/UL (ref 1.7–7.7)
NEUTROPHILS NFR BLD: 59.2 %
PLATELET # BLD AUTO: 205 K/UL (ref 135–450)
PMV BLD AUTO: 9.6 FL (ref 5–10.5)
POTASSIUM SERPL-SCNC: 4 MMOL/L (ref 3.5–5.1)
PROT SERPL-MCNC: 6.7 G/DL (ref 6.4–8.2)
RBC # BLD AUTO: 4.56 M/UL (ref 4–5.2)
SODIUM SERPL-SCNC: 142 MMOL/L (ref 136–145)
TRIGL SERPL-MCNC: 164 MG/DL (ref 0–150)
TSH SERPL DL<=0.005 MIU/L-ACNC: 1.88 UIU/ML (ref 0.27–4.2)
VLDLC SERPL CALC-MCNC: 33 MG/DL
WBC # BLD AUTO: 6.8 K/UL (ref 4–11)

## 2024-03-21 ENCOUNTER — CLINICAL DOCUMENTATION (OUTPATIENT)
Dept: SPEECH THERAPY | Age: 59
End: 2024-03-21

## 2024-03-21 NOTE — PROGRESS NOTES
Repeat assessment of subglottic inflammation; placed on steroid-based inhaler ~one month ago w/ improvement in symptoms of throat clearing. Endoscopy revealed increased protrusion/irregular tissue changes on R anterior tracheal wall extending toward R lateral wall. No symptoms of dyspnea or dysphonia.                 Thank you,    Kristin Julio MA (Katie), CCC-SLP; [id number]  Speech-Language Pathologist

## 2024-03-22 ENCOUNTER — OFFICE VISIT (OUTPATIENT)
Dept: FAMILY MEDICINE CLINIC | Age: 59
End: 2024-03-22
Payer: COMMERCIAL

## 2024-03-22 VITALS
DIASTOLIC BLOOD PRESSURE: 82 MMHG | BODY MASS INDEX: 37.07 KG/M2 | OXYGEN SATURATION: 96 % | SYSTOLIC BLOOD PRESSURE: 116 MMHG | HEART RATE: 84 BPM | WEIGHT: 251 LBS

## 2024-03-22 DIAGNOSIS — Z00.00 ROUTINE GENERAL MEDICAL EXAMINATION AT A HEALTH CARE FACILITY: Primary | ICD-10-CM

## 2024-03-22 DIAGNOSIS — R73.9 HYPERGLYCEMIA: ICD-10-CM

## 2024-03-22 DIAGNOSIS — J04.0: ICD-10-CM

## 2024-03-22 DIAGNOSIS — E78.00 HYPERCHOLESTEROLEMIA: ICD-10-CM

## 2024-03-22 DIAGNOSIS — B37.9 CANDIDA INFECTION: ICD-10-CM

## 2024-03-22 DIAGNOSIS — F41.8 DEPRESSION WITH ANXIETY: ICD-10-CM

## 2024-03-22 DIAGNOSIS — M05.79 RHEUMATOID ARTHRITIS INVOLVING MULTIPLE SITES WITH POSITIVE RHEUMATOID FACTOR (HCC): ICD-10-CM

## 2024-03-22 DIAGNOSIS — K58.2 IRRITABLE BOWEL SYNDROME WITH BOTH CONSTIPATION AND DIARRHEA: ICD-10-CM

## 2024-03-22 DIAGNOSIS — R63.5 WEIGHT GAIN: ICD-10-CM

## 2024-03-22 DIAGNOSIS — I10 ESSENTIAL HYPERTENSION: ICD-10-CM

## 2024-03-22 DIAGNOSIS — R05.3 CHRONIC COUGH: ICD-10-CM

## 2024-03-22 PROCEDURE — 3079F DIAST BP 80-89 MM HG: CPT | Performed by: FAMILY MEDICINE

## 2024-03-22 PROCEDURE — 3074F SYST BP LT 130 MM HG: CPT | Performed by: FAMILY MEDICINE

## 2024-03-22 PROCEDURE — 99396 PREV VISIT EST AGE 40-64: CPT | Performed by: FAMILY MEDICINE

## 2024-03-22 RX ORDER — KETOCONAZOLE 20 MG/G
CREAM TOPICAL
Qty: 60 G | Refills: 5 | Status: SHIPPED | OUTPATIENT
Start: 2024-03-22

## 2024-03-22 RX ORDER — TIZANIDINE 4 MG/1
4 TABLET ORAL EVERY 8 HOURS PRN
Qty: 30 TABLET | Refills: 5 | Status: SHIPPED | OUTPATIENT
Start: 2024-03-22

## 2024-03-22 RX ORDER — FLUTICASONE PROPIONATE 220 UG/1
2 AEROSOL, METERED RESPIRATORY (INHALATION) 2 TIMES DAILY
Qty: 1 EACH | Refills: 0 | Status: SHIPPED | OUTPATIENT
Start: 2024-03-22 | End: 2025-03-22

## 2024-03-22 NOTE — PROGRESS NOTES
Sitting, Cuff Size: Medium Adult)   Pulse 84   Wt 113.9 kg (251 lb)   SpO2 96%   BMI 37.07 kg/m²   General appearance - healthy, alert, no distress  Skin - Skin color, texture, turgor normal. No rashes or lesions.  No suspicious findings  Head - Normocephalic. No masses, lesions, tenderness or abnormalities  Eyes - conjunctivae/corneas clear. Pupils equal and reactive to light and accomodation, extraocular muscles intact.  Ears - External ears normal. Canals clear. Tympanic membranes normal bilaterally.  Nose/Sinuses - Nares normal. Septum midline. Mucosa normal. No drainage or sinus tenderness.  Oropharynx - Lips, mucosa, and tongue normal. Teeth and gums normal.   Neck - Neck supple. No adenopathy. Thyroid symmetric, normal size, no carotid bruit bilaterally  Back - Back symmetric, no curvature. Range of motion normal. No Costovertebral angle tenderness.  Lungs - Percussion normal. Good diaphragmatic excursion. Lungs clear without wheeze, rales, crackles  Heart - Regular rate and rhythm, with no rub, murmur or gallop noted.  Abdomen - Abdomen soft, non-tender. Bowel sounds normal. No masses, tenderness or organomegaly  Extremities - Extremities normal. No deformities, edema, or skin discoloration  Musculoskeletal - Spine ROM normal. Muscular strength intact.  Peripheral pulses - radial=4/4,, femoral=4/4, popliteal=4/4, dorsalis pedis=4/4,  Neuro - Gait normal. Reflexes normal and symmetric. Sensation grossly normal.  No focal weakness  Psych - euthymic, no suicidal thoughts or ideation, mood stable.        Current Outpatient Medications   Medication Sig Dispense Refill    Phentermine-Topiramate 3.75-23 MG CP24 Take 1 capsule by mouth daily for 30 days. Max Daily Amount: 1 capsule 30 capsule 0    tiZANidine (ZANAFLEX) 4 MG tablet Take 1 tablet by mouth every 8 hours as needed (muscle spasm) 30 tablet 5    ketoconazole (NIZORAL) 2 % cream Apply topically daily. 60 g 5    fluticasone (FLOVENT HFA) 220 MCG/ACT

## 2024-04-01 ENCOUNTER — PATIENT MESSAGE (OUTPATIENT)
Dept: FAMILY MEDICINE CLINIC | Age: 59
End: 2024-04-01

## 2024-04-01 DIAGNOSIS — R63.5 WEIGHT GAIN: Primary | ICD-10-CM

## 2024-04-01 RX ORDER — TRIAMTERENE AND HYDROCHLOROTHIAZIDE 75; 50 MG/1; MG/1
TABLET ORAL
Qty: 90 TABLET | Refills: 1 | Status: SHIPPED | OUTPATIENT
Start: 2024-04-01 | End: 2024-04-02 | Stop reason: SDUPTHER

## 2024-04-01 RX ORDER — ROSUVASTATIN CALCIUM 10 MG/1
TABLET, COATED ORAL
Qty: 90 TABLET | Refills: 1 | Status: SHIPPED | OUTPATIENT
Start: 2024-04-01

## 2024-04-01 NOTE — TELEPHONE ENCOUNTER
From: Lexie Isaacs  To: Dr. Reilly Canales  Sent: 4/1/2024 10:09 AM EDT  Subject: Qsymia/out of Maxzide soon    Good morning-  I hope you had a wonderful vacation! I haven’t noticed a big change in my appetite on the starter dose of Qsymia and read that you can go to the next dose after 2 weeks. Would we be able to go ahead and send a 3 month supply to the mail order for the 7.5/46 dose? They said you can escribe it to [facility] in [place], [place], [phone]. Thanks!   Also, I am out of my Maxzide on Wednesday and [facility] will allow a 30 day supply to be sent to [facility].   Thanks!   Lexie

## 2024-04-02 RX ORDER — TRIAMTERENE AND HYDROCHLOROTHIAZIDE 75; 50 MG/1; MG/1
TABLET ORAL
Qty: 30 TABLET | Refills: 5 | Status: SHIPPED | OUTPATIENT
Start: 2024-04-02

## 2024-04-02 RX ORDER — PHENTERMINE AND TOPIRAMATE 7.5; 46 MG/1; MG/1
1 CAPSULE, EXTENDED RELEASE ORAL DAILY
Qty: 90 CAPSULE | Refills: 1 | Status: SHIPPED | OUTPATIENT
Start: 2024-04-02 | End: 2024-07-01

## 2024-04-08 ENCOUNTER — TELEPHONE (OUTPATIENT)
Dept: ORTHOPEDIC SURGERY | Age: 59
End: 2024-04-08

## 2024-04-08 NOTE — TELEPHONE ENCOUNTER
General Question     Subject: REQUESTING AN ORDER FOR A SYNVISC INJECTION. ALSO, XRAYS ON R KNEE. NEXT WEEK.  Patient and /or Facility Request: Lexie Isaacs   Contact Number: [phone]

## 2024-04-09 ENCOUNTER — TELEPHONE (OUTPATIENT)
Age: 59
End: 2024-04-09

## 2024-04-09 ENCOUNTER — OFFICE VISIT (OUTPATIENT)
Age: 59
End: 2024-04-09
Payer: COMMERCIAL

## 2024-04-09 VITALS
OXYGEN SATURATION: 98 % | DIASTOLIC BLOOD PRESSURE: 70 MMHG | HEART RATE: 84 BPM | BODY MASS INDEX: 37.07 KG/M2 | HEIGHT: 69 IN | SYSTOLIC BLOOD PRESSURE: 124 MMHG | RESPIRATION RATE: 14 BRPM

## 2024-04-09 DIAGNOSIS — G89.29 CHRONIC INTRACTABLE HEADACHE, UNSPECIFIED HEADACHE TYPE: Primary | ICD-10-CM

## 2024-04-09 DIAGNOSIS — R51.9 CHRONIC INTRACTABLE HEADACHE, UNSPECIFIED HEADACHE TYPE: Primary | ICD-10-CM

## 2024-04-09 PROCEDURE — 3074F SYST BP LT 130 MM HG: CPT | Performed by: PSYCHIATRY & NEUROLOGY

## 2024-04-09 PROCEDURE — 3078F DIAST BP <80 MM HG: CPT | Performed by: PSYCHIATRY & NEUROLOGY

## 2024-04-09 PROCEDURE — 99204 OFFICE O/P NEW MOD 45 MIN: CPT | Performed by: PSYCHIATRY & NEUROLOGY

## 2024-04-09 NOTE — PROGRESS NOTES
Neurology outpatient new visit    Patient name: Lexie Isaacs      Chief Complaint:  Refractory headache.    History of present illness:  This is a 59 years old right-handed female.  The patient is here for evaluation of refractory headache.  The onset was about more than 5 years ago.  The patient describes her headache as significant tenderness over the right posterior auricular area.  The patient denies significant nauseous, phonophobia or photophobia with this headache.  The headache is always at this area.  It can last several hours.  It can occur almost every day.  The patient is currently taking tizanidine and trazodone at bedtime to sleep.  The patient denies focal weakness or numbness elsewhere.    Past medical history:    Past Medical History:   Diagnosis Date    Abnormal Pap smear of cervix     Autoimmune disorder (HCC)     Breast disorder     DDD (degenerative disc disease), cervical     resolved s/p surgery    Depression with anxiety     Endometriosis     Essential hypertension 06/20/2019    GERD (gastroesophageal reflux disease)     Hemangioma of liver 04/12/2022    Hypercholesterolemia     Hyperglycemia     Lumbar strain 9/7/2023    Menopause ovarian failure     Migraine     PONV (postoperative nausea and vomiting)     with general anesthesia    Spastic colon        Past surgical history:    Past Surgical History:   Procedure Laterality Date    APPENDECTOMY      CERVICAL LAMINECTOMY  2010    CHOLECYSTECTOMY      COLONOSCOPY      COLONOSCOPY  2/7/2022    COLONOSCOPY POLYPECTOMY SNARE/COLD BIOPSY performed by Tonny Atkins MD at [facility] ENDOSCOPY    ENDOMETRIAL ABLATION      d/t bleeding    LAPAROSCOPY      x3 for endometriosis    UPPER GASTROINTESTINAL ENDOSCOPY N/A 10/29/2020    EGD BIOPSY performed by Tonny Atkins MD at [facility] ENDOSCOPY    UPPER GASTROINTESTINAL ENDOSCOPY N/A 2/7/2022    EGD BIOPSY performed by Tonny Atkins MD at [facility] ENDOSCOPY        Medication:    Current Outpatient

## 2024-04-09 NOTE — TELEPHONE ENCOUNTER
Lexie completed NP appt today with Dr. Orosco & he ordered head CT.  She would like to have this completed through [facility] in [facility].  Once today's office note is signed by provider, will fax orders to [facility] & ask them to obatin the prior auth.  F/U will depend on results.

## 2024-04-10 DIAGNOSIS — M25.561 ACUTE PAIN OF RIGHT KNEE: ICD-10-CM

## 2024-04-10 DIAGNOSIS — M17.11 PRIMARY OSTEOARTHRITIS OF RIGHT KNEE: Primary | ICD-10-CM

## 2024-04-10 NOTE — TELEPHONE ENCOUNTER
Patient is able to get gel injection the soonest on 4/19... can schedule that following week with Nawaf or Moraima if spots are available.  Injection is submitted to ins, waiting on approval.

## 2024-04-10 NOTE — TELEPHONE ENCOUNTER
Lexie is going to have head CT completed through [facility].  No longer going to [facility].  No precert required.

## 2024-04-12 ENCOUNTER — HOSPITAL ENCOUNTER (OUTPATIENT)
Dept: CT IMAGING | Age: 59
Discharge: HOME | End: 2024-04-12
Attending: PSYCHIATRY & NEUROLOGY
Payer: COMMERCIAL

## 2024-04-12 DIAGNOSIS — G89.29 CHRONIC INTRACTABLE HEADACHE, UNSPECIFIED HEADACHE TYPE: ICD-10-CM

## 2024-04-12 DIAGNOSIS — R51.9 CHRONIC INTRACTABLE HEADACHE, UNSPECIFIED HEADACHE TYPE: ICD-10-CM

## 2024-04-12 PROCEDURE — 70470 CT HEAD/BRAIN W/O & W/DYE: CPT

## 2024-04-12 PROCEDURE — 6360000004 HC RX CONTRAST MEDICATION: Performed by: PSYCHIATRY & NEUROLOGY

## 2024-04-12 RX ADMIN — IOPAMIDOL 75 ML: 755 INJECTION, SOLUTION INTRAVENOUS at 07:33

## 2024-04-22 ENCOUNTER — PROCEDURE VISIT (OUTPATIENT)
Age: 59
End: 2024-04-22

## 2024-04-22 VITALS
HEART RATE: 87 BPM | BODY MASS INDEX: 37.07 KG/M2 | OXYGEN SATURATION: 97 % | SYSTOLIC BLOOD PRESSURE: 118 MMHG | HEIGHT: 69 IN | DIASTOLIC BLOOD PRESSURE: 68 MMHG

## 2024-04-22 DIAGNOSIS — G89.29 CHRONIC INTRACTABLE HEADACHE, UNSPECIFIED HEADACHE TYPE: ICD-10-CM

## 2024-04-22 DIAGNOSIS — M54.81 OCCIPITAL NEURALGIA OF RIGHT SIDE: Primary | ICD-10-CM

## 2024-04-22 DIAGNOSIS — R51.9 CHRONIC INTRACTABLE HEADACHE, UNSPECIFIED HEADACHE TYPE: ICD-10-CM

## 2024-04-22 RX ORDER — LIDOCAINE HYDROCHLORIDE 20 MG/ML
5 INJECTION, SOLUTION INFILTRATION; PERINEURAL ONCE
Status: COMPLETED | OUTPATIENT
Start: 2024-04-22 | End: 2024-04-22

## 2024-04-22 RX ORDER — DEXAMETHASONE SODIUM PHOSPHATE 10 MG/ML
10 INJECTION INTRAMUSCULAR; INTRAVENOUS ONCE
Status: COMPLETED | OUTPATIENT
Start: 2024-04-22 | End: 2024-04-22

## 2024-04-22 RX ADMIN — DEXAMETHASONE SODIUM PHOSPHATE 10 MG: 10 INJECTION INTRAMUSCULAR; INTRAVENOUS at 11:16

## 2024-04-22 RX ADMIN — LIDOCAINE HYDROCHLORIDE 5 ML: 20 INJECTION, SOLUTION INFILTRATION; PERINEURAL at 11:17

## 2024-04-22 NOTE — PROGRESS NOTES
Occipital nerve block    Indication:    Right occipital neuralgia    Technique:    Identify landmarks and chuy the injection site as the diagram below  Move hair from area (e.g. with assistant or lubricating jelly can be applied)  Clean the area (e.g. hibiclens, betadine, or Alcohol)    Preparation:    Syringe: 5 cc  Needle: 27 gauge 1.25\"  1% Lidocaine 3 cc  Dexamethasone 10 mg or 1 cc    Injection:    Insert the needle from inferior approach  Angle approximately 30-45 degrees and insert until striking periosteum  Aspirate for blood and if found, withdraw and redirect needle (to prevent intravascular injection)  Inject a total of 2-3 cc of medication at site, distributing in a fan-shaped distribution, LYLY technique

## 2024-04-22 NOTE — PATIENT INSTRUCTIONS

## 2024-04-23 ENCOUNTER — CLINICAL DOCUMENTATION (OUTPATIENT)
Dept: SPEECH THERAPY | Age: 59
End: 2024-04-23

## 2024-04-23 ENCOUNTER — TELEPHONE (OUTPATIENT)
Dept: ENT CLINIC | Age: 59
End: 2024-04-23

## 2024-04-23 DIAGNOSIS — J04.0 LARYNGITIS: Primary | ICD-10-CM

## 2024-04-23 RX ORDER — FLUCONAZOLE 100 MG/1
TABLET ORAL
Qty: 15 TABLET | Refills: 0 | Status: SHIPPED | OUTPATIENT
Start: 2024-04-23

## 2024-04-23 NOTE — PROGRESS NOTES
Repeat endoscopy d/t new onset dysphonia characterized as vocal roughness in both speaking/singing voice; noted ~one month ago but gradually worsening. Improved globus + throat clearing w/ use of steroid-based inhaler; continues to use daily.     Observed to have reduced inflammation + erythema of subglottis; ongoing collection of thick, discolored mucus. New onset of white, irregular tissue changes of bilateral medial-edge of TVFs, similar in appearance to fungal growth; resulted in incomplete glottic closure and mild-moderately reduced mucosal wave.     Encouraged to continue use of inhaler w/ gargle of warm water following to prevent build-up of particles. Will review w/ ENT for treatment recommendations.                      Thank you,    Kristin Julio MA (Katie), CCC-SLP; [id number]  Speech-Language Pathologist

## 2024-04-23 NOTE — TELEPHONE ENCOUNTER
----- Message from LOIS Liu sent at 4/23/2024 12:15 PM EDT -----  Regarding: Fungal Laryngitis  See clinical documentation completed today for pictures! Thank you!

## 2024-05-02 ENCOUNTER — OFFICE VISIT (OUTPATIENT)
Dept: ORTHOPEDIC SURGERY | Age: 59
End: 2024-05-02

## 2024-05-02 DIAGNOSIS — M25.561 ACUTE PAIN OF RIGHT KNEE: ICD-10-CM

## 2024-05-02 DIAGNOSIS — M17.11 PRIMARY OSTEOARTHRITIS OF RIGHT KNEE: Primary | ICD-10-CM

## 2024-05-02 RX ORDER — LIDOCAINE HYDROCHLORIDE 10 MG/ML
3 INJECTION, SOLUTION EPIDURAL; INFILTRATION; INTRACAUDAL; PERINEURAL ONCE
Status: COMPLETED | OUTPATIENT
Start: 2024-05-02 | End: 2024-05-02

## 2024-05-02 RX ADMIN — LIDOCAINE HYDROCHLORIDE 3 ML: 10 INJECTION, SOLUTION EPIDURAL; INFILTRATION; INTRACAUDAL; PERINEURAL at 15:21

## 2024-05-11 NOTE — PROGRESS NOTES
Chief Complaint    Knee Pain (Ck right knee, synvisc)      History of Present Illness:  Lexie Isaacs is a 59 y.o. female who presents for a viscosupplementation injection.  This patient is being treated conservatively for the arthritis in her right knee. This treatment includes; rest, ice, elevation, bracing, home exercises, activity modification, NSAIDs as needed, corticosteroid injections and visco supplementation injections.  She rates her pain 2/10, dull achy and sharp.  She feels that her symptoms have not worsened in the past month.  Symptoms are aggravated by kneeling, squatting or going downstairs.  The patient denies any new injury. The patient denies any numbness, paresthesias, or weakness.            Physical exam:  Inspection: Both knees without erythema, ecchymosis, discoloration, abrasion, contusions, deformity or signs of infection.  Palpation: There is tenderness to palpation to the joint line of the right knee.  There is patellofemoral crepitus palpable in both knees.  No tenderness to palpation to any other osseous or soft tissue structures of the knee.  Range of motion: Grossly intact  Neurovascular: Patient is neurovascularly intact, equally bilaterally.  2+ dorsal pedal pulses.      Procedures:    VISCO SUPPLEMENTATION  INJECTION:  Right  KNEE    PROCEDURE: Risks, benefits, and alternatives to the injections were discussed in detail with the patient. The risks discussed included but are not limited to infection, skin reactions, hot swollen joints, and anaphylaxis.     After informed consent was provided, the patient sat on the exam table. The anterior lateral aspect of the right knee was prepped with Chlora-prep. The skin and subcutaneous tissues were anesthetized with ethyl chloride spray and 1% lidocaine injected with a 20-gauge needle.  The needle was left in place and the syringe was detached from the needle.  The Synvisc One syringe was attached to the 20-gauge needle and 48 mg of the

## 2024-05-20 ENCOUNTER — OFFICE VISIT (OUTPATIENT)
Dept: FAMILY MEDICINE CLINIC | Age: 59
End: 2024-05-20
Payer: COMMERCIAL

## 2024-05-20 ENCOUNTER — PATIENT MESSAGE (OUTPATIENT)
Dept: FAMILY MEDICINE CLINIC | Age: 59
End: 2024-05-20

## 2024-05-20 VITALS
HEART RATE: 89 BPM | SYSTOLIC BLOOD PRESSURE: 112 MMHG | WEIGHT: 220.4 LBS | BODY MASS INDEX: 32.55 KG/M2 | OXYGEN SATURATION: 98 % | DIASTOLIC BLOOD PRESSURE: 72 MMHG

## 2024-05-20 DIAGNOSIS — R41.840 INATTENTION: ICD-10-CM

## 2024-05-20 DIAGNOSIS — H00.012 HORDEOLUM EXTERNUM OF RIGHT LOWER EYELID: ICD-10-CM

## 2024-05-20 DIAGNOSIS — R63.5 WEIGHT GAIN: Primary | ICD-10-CM

## 2024-05-20 DIAGNOSIS — M05.79 RHEUMATOID ARTHRITIS INVOLVING MULTIPLE SITES WITH POSITIVE RHEUMATOID FACTOR (HCC): ICD-10-CM

## 2024-05-20 DIAGNOSIS — F41.8 DEPRESSION WITH ANXIETY: ICD-10-CM

## 2024-05-20 DIAGNOSIS — I10 ESSENTIAL HYPERTENSION: ICD-10-CM

## 2024-05-20 PROCEDURE — G2211 COMPLEX E/M VISIT ADD ON: HCPCS | Performed by: FAMILY MEDICINE

## 2024-05-20 PROCEDURE — 3074F SYST BP LT 130 MM HG: CPT | Performed by: FAMILY MEDICINE

## 2024-05-20 PROCEDURE — 3078F DIAST BP <80 MM HG: CPT | Performed by: FAMILY MEDICINE

## 2024-05-20 PROCEDURE — 99214 OFFICE O/P EST MOD 30 MIN: CPT | Performed by: FAMILY MEDICINE

## 2024-05-20 RX ORDER — CIPROFLOXACIN HYDROCHLORIDE 3.5 MG/ML
1 SOLUTION/ DROPS TOPICAL 3 TIMES DAILY
Qty: 2.5 ML | Refills: 0 | Status: SHIPPED | OUTPATIENT
Start: 2024-05-20 | End: 2024-05-27

## 2024-05-20 SDOH — ECONOMIC STABILITY: FOOD INSECURITY: WITHIN THE PAST 12 MONTHS, YOU WORRIED THAT YOUR FOOD WOULD RUN OUT BEFORE YOU GOT MONEY TO BUY MORE.: NEVER TRUE

## 2024-05-20 SDOH — ECONOMIC STABILITY: FOOD INSECURITY: WITHIN THE PAST 12 MONTHS, THE FOOD YOU BOUGHT JUST DIDN'T LAST AND YOU DIDN'T HAVE MONEY TO GET MORE.: NEVER TRUE

## 2024-05-20 SDOH — ECONOMIC STABILITY: INCOME INSECURITY: HOW HARD IS IT FOR YOU TO PAY FOR THE VERY BASICS LIKE FOOD, HOUSING, MEDICAL CARE, AND HEATING?: NOT HARD AT ALL

## 2024-05-20 ASSESSMENT — PATIENT HEALTH QUESTIONNAIRE - PHQ9
SUM OF ALL RESPONSES TO PHQ QUESTIONS 1-9: 0
SUM OF ALL RESPONSES TO PHQ QUESTIONS 1-9: 0
6. FEELING BAD ABOUT YOURSELF - OR THAT YOU ARE A FAILURE OR HAVE LET YOURSELF OR YOUR FAMILY DOWN: NOT AT ALL
1. LITTLE INTEREST OR PLEASURE IN DOING THINGS: NOT AT ALL
3. TROUBLE FALLING OR STAYING ASLEEP: NOT AT ALL
4. FEELING TIRED OR HAVING LITTLE ENERGY: NOT AT ALL
SUM OF ALL RESPONSES TO PHQ9 QUESTIONS 1 & 2: 0
5. POOR APPETITE OR OVEREATING: NOT AT ALL
2. FEELING DOWN, DEPRESSED OR HOPELESS: NOT AT ALL
7. TROUBLE CONCENTRATING ON THINGS, SUCH AS READING THE NEWSPAPER OR WATCHING TELEVISION: NOT AT ALL
8. MOVING OR SPEAKING SO SLOWLY THAT OTHER PEOPLE COULD HAVE NOTICED. OR THE OPPOSITE, BEING SO FIGETY OR RESTLESS THAT YOU HAVE BEEN MOVING AROUND A LOT MORE THAN USUAL: NOT AT ALL
9. THOUGHTS THAT YOU WOULD BE BETTER OFF DEAD, OR OF HURTING YOURSELF: NOT AT ALL
10. IF YOU CHECKED OFF ANY PROBLEMS, HOW DIFFICULT HAVE THESE PROBLEMS MADE IT FOR YOU TO DO YOUR WORK, TAKE CARE OF THINGS AT HOME, OR GET ALONG WITH OTHER PEOPLE: NOT DIFFICULT AT ALL
SUM OF ALL RESPONSES TO PHQ QUESTIONS 1-9: 0
SUM OF ALL RESPONSES TO PHQ QUESTIONS 1-9: 0

## 2024-05-20 NOTE — PROGRESS NOTES
Pt is doing well, remains healthy.  Pt has been doing well with start of qsymia but has noted some issues with mild memory loss, and some abnormal pressure feeling behind eyes.  Vision is normal.  Pt has lost weight and has cut down on soda, sweets and has cut down significantly on alcohol.  Pt does feel a food aversion.  Weight is down 15#.  Pt noticed the side effects.  Pt did see optometry and everything was fine.      RA sx are stable, doing great with orencia.  Pt is happy with benefit and doing well    Pt does have chronic issues with focus and attention, pt has noted sx even when she was young.  Pt has noted that at work, can struggle with staying on task.  Uses a lot of sticky notes.  Can get overwhelmed easily, and with having so much on her plate it is challenging.        Except as noted above in the history of present illness, the review of systems is  negative for headache, vision changes, chest pain, shortness of breath, abdominal pain, urinary sx, bowel changes.    Past medical, surgical, and social history reviewed and updated  Medications and allergies reviewed and updated      O: /72 (Site: Right Upper Arm, Position: Sitting, Cuff Size: Large Adult)   Pulse 89   Wt 100 kg (220 lb 6.4 oz)   SpO2 98%   BMI 32.55 kg/m²   GEN: No acute distress, cooperative, well nourished, alert.   HEENT: PEERLA, EOMI , normocephalic/atraumatic, nares and oropharynx clear.  Mucous membranes normal, Tympanic membranes clear bilaterally.   Neck: soft, supple, no thyromegaly, mass, no Lymphadenopathy  CV: Regular rate and rhythm, no murmur, rubs, gallops. No edema.  Resp: Clear to auscultation bilaterally good air entry bilaterally  No crackles, wheeze. Breathing comfortably.   Psych: mood stable, No suicidal thoughts or ideation       Current Outpatient Medications   Medication Sig Dispense Refill    abatacept (ORENCIA) 250 MG injection 250mg IV monthly 1 each 5    ciprofloxacin (CILOXAN) 0.3 % ophthalmic

## 2024-06-21 ENCOUNTER — OFFICE VISIT (OUTPATIENT)
Dept: FAMILY MEDICINE CLINIC | Age: 59
End: 2024-06-21

## 2024-06-21 VITALS
DIASTOLIC BLOOD PRESSURE: 74 MMHG | SYSTOLIC BLOOD PRESSURE: 128 MMHG | WEIGHT: 221.4 LBS | BODY MASS INDEX: 32.7 KG/M2 | TEMPERATURE: 97.8 F | RESPIRATION RATE: 14 BRPM | OXYGEN SATURATION: 98 % | HEART RATE: 78 BPM

## 2024-06-21 DIAGNOSIS — I10 ESSENTIAL HYPERTENSION: ICD-10-CM

## 2024-06-21 DIAGNOSIS — M05.79 RHEUMATOID ARTHRITIS INVOLVING MULTIPLE SITES WITH POSITIVE RHEUMATOID FACTOR (HCC): Primary | ICD-10-CM

## 2024-06-21 DIAGNOSIS — F98.8 ATTENTION DEFICIT DISORDER (ADD) WITHOUT HYPERACTIVITY: ICD-10-CM

## 2024-06-21 DIAGNOSIS — F43.9 STRESS: ICD-10-CM

## 2024-06-21 DIAGNOSIS — R63.5 WEIGHT GAIN: ICD-10-CM

## 2024-06-21 RX ORDER — DEXTROAMPHETAMINE SACCHARATE, AMPHETAMINE ASPARTATE MONOHYDRATE, DEXTROAMPHETAMINE SULFATE AND AMPHETAMINE SULFATE 2.5; 2.5; 2.5; 2.5 MG/1; MG/1; MG/1; MG/1
10 CAPSULE, EXTENDED RELEASE ORAL DAILY
Qty: 30 CAPSULE | Refills: 0 | Status: SHIPPED | OUTPATIENT
Start: 2024-06-21 | End: 2024-07-21

## 2024-06-21 RX ORDER — ESCITALOPRAM OXALATE 10 MG/1
15 TABLET ORAL DAILY
Qty: 135 TABLET | Refills: 3 | Status: SHIPPED | OUTPATIENT
Start: 2024-06-21

## 2024-06-21 NOTE — PROGRESS NOTES
triamterene-hydroCHLOROthiazide (MAXZIDE) 75-50 MG per tablet TAKE ONE TABLET BY MOUTH ONCE A DAY 30 tablet 5    rosuvastatin (CRESTOR) 10 MG tablet TAKE ONE TABLET BY MOUTH EVERY NIGHT 90 tablet 1    tiZANidine (ZANAFLEX) 4 MG tablet Take 1 tablet by mouth every 8 hours as needed (muscle spasm) 30 tablet 5    ketoconazole (NIZORAL) 2 % cream Apply topically daily. 60 g 5    nystatin (MYCOSTATIN) 652281 UNIT/GM powder Apply 3 times daily. 60 g 1    traZODone (DESYREL) 50 MG tablet TAKE ONE TO TWO TABLETS BY MOUTH NIGHTLY 180 tablet 3    ondansetron (ZOFRAN) 4 MG tablet Take 1 tablet by mouth daily as needed for Nausea or Vomiting 30 tablet 0    Omega-3 Fatty Acids (FISH OIL) 1000 MG CAPS Take 1 capsule by mouth daily      Multiple Vitamins-Minerals (THERAPEUTIC MULTIVITAMIN-MINERALS) tablet Take 1 tablet by mouth daily      hyoscyamine (ANASPAZ;LEVSIN) 125 MCG tablet Take 1 tablet by mouth every 4 hours as needed for Cramping      Probiotic Product (PROBIOTIC ADVANCED PO) Take 1 tablet by mouth daily       No current facility-administered medications for this visit.           ASSESSMENT / PLAN:    1. Rheumatoid arthritis involving multiple sites with positive rheumatoid factor (HCC)  Stable, controlled  Cont orencia and f/u with rheum    2. Essential hypertension  Stable at goal, controlled  Cont maxzide and monitor closely with start stimulant therapy    3. Weight gain  Off qsymia d/t side effects associated with topamax  Monitor with lifestyle mgt, adderall    4. Attention deficit disorder (ADD) without hyperactivity  Reviewed adult ADD questionnaire  Trial adderall XR 10mg qd  Discussed use, benefit, and side effects of prescribed medications.  Barriers to medication compliance addressed.  All patient questions answered.  Pt voiced understanding.   F/u 1 month  Monitor blood pressure closely  - amphetamine-dextroamphetamine (ADDERALL XR) 10 MG extended release capsule; Take 1 capsule by mouth daily for 30 days.

## 2024-06-27 ENCOUNTER — OFFICE VISIT (OUTPATIENT)
Dept: SURGERY | Age: 59
End: 2024-06-27
Payer: COMMERCIAL

## 2024-06-27 ENCOUNTER — HOSPITAL ENCOUNTER (OUTPATIENT)
Dept: MAMMOGRAPHY | Age: 59
Discharge: HOME | End: 2024-06-27
Payer: COMMERCIAL

## 2024-06-27 ENCOUNTER — HOSPITAL ENCOUNTER (OUTPATIENT)
Dept: ULTRASOUND IMAGING | Age: 59
Discharge: HOME | End: 2024-06-27
Payer: COMMERCIAL

## 2024-06-27 VITALS
WEIGHT: 223 LBS | DIASTOLIC BLOOD PRESSURE: 78 MMHG | SYSTOLIC BLOOD PRESSURE: 118 MMHG | HEIGHT: 69 IN | HEART RATE: 77 BPM | OXYGEN SATURATION: 98 % | BODY MASS INDEX: 33.03 KG/M2

## 2024-06-27 DIAGNOSIS — N60.01 BILATERAL BREAST CYSTS: ICD-10-CM

## 2024-06-27 DIAGNOSIS — R92.8 ABNORMAL FINDING ON BREAST IMAGING: Primary | ICD-10-CM

## 2024-06-27 DIAGNOSIS — R92.8 ABNORMAL MAMMOGRAM: ICD-10-CM

## 2024-06-27 DIAGNOSIS — N63.10 MASS OF RIGHT BREAST, UNSPECIFIED QUADRANT: ICD-10-CM

## 2024-06-27 DIAGNOSIS — N60.02 BILATERAL BREAST CYSTS: ICD-10-CM

## 2024-06-27 DIAGNOSIS — R92.8 ABNORMAL FINDING ON BREAST IMAGING: ICD-10-CM

## 2024-06-27 DIAGNOSIS — Z80.3 FAMILY HISTORY OF BREAST CANCER: ICD-10-CM

## 2024-06-27 DIAGNOSIS — Z12.39 ENCOUNTER FOR SCREENING BREAST EXAMINATION: Primary | ICD-10-CM

## 2024-06-27 PROCEDURE — 76642 ULTRASOUND BREAST LIMITED: CPT

## 2024-06-27 PROCEDURE — 3074F SYST BP LT 130 MM HG: CPT | Performed by: NURSE PRACTITIONER

## 2024-06-27 PROCEDURE — 3078F DIAST BP <80 MM HG: CPT | Performed by: NURSE PRACTITIONER

## 2024-06-27 PROCEDURE — 99213 OFFICE O/P EST LOW 20 MIN: CPT | Performed by: NURSE PRACTITIONER

## 2024-06-27 PROCEDURE — G0279 TOMOSYNTHESIS, MAMMO: HCPCS

## 2024-06-27 RX ORDER — MICONAZOLE NITRATE 20 MG/G
POWDER TOPICAL
Qty: 45 G | Refills: 1 | Status: SHIPPED | OUTPATIENT
Start: 2024-06-27

## 2024-06-27 NOTE — PATIENT INSTRUCTIONS
[employer] [facility]    [employer]  [address]  [address]  [place] [address]  [phone]  Cert[employer] and works with [employer] and [employer].  [id number]  Th-Sat 12PM-6PM  Sun 3PM-6PM      [place] at [address] [address]  [phone]  Certified [employer], does not order prothesis  Hours are varied due to staffing, call ahead before going.      [place] at [address] [address]  [phone]  [employer]ified [employer], does not order prothesis  Hours are varied due to staffing, call ahead before going.      [place] at [address] [address]  [phone]  Certified [employer], does not order prothesis  Hours are varied due to staffing, call ahead before going.      [employer] [facility]     [place] at [facility]  [address]  [address]  [place] [address]  [phone]  Certified [employer], does not order prothesis  S-10PM-5PM  M-Sat 10AM-8PM      [place] [facility] [place]  [address] [place] [address]  [phone]  Certified [employer], does not order prothesis  S-12PM-6PM  [id number]  [id number]    [employer] Moisture [employer] with Antimicrobial Silver  Apply 1-2 times daily underneath affected breast.  Change if dressing becomes soiled.  Keep area clean and dry.  Wash with mild soap and pat gently dry.

## 2024-06-27 NOTE — PROGRESS NOTES
well perfused.         Risk assessment using CURT Breast Cancer Risk Evaluation Tool was used to evaluate her risk compared to the general population.  Her lifetime risk for breast cancer is 16.1% (general population average 10.3%).          ASSESSMENT:   Right breast cyst - new 6x5mm simple cyst at 1:00, 2xcmFN right breast on imaging 6/27/2024.  Benign   Abnormal breast imaging - 11x5mm cluster of microcysts at 2:00, 7cmFN left breast on imaging 5/18/2023, stable on follow up imaging 12/7/2023 and 6/27/2024  Right Breast Mass - 0.5 x 0.2 cm oval anechoic mass at 12:00 retroareolar on right breast ultrasound 4-8-2022, consistent with a simple cyst, stable on 6-month follow-up 10/13/2022, and annual f/u 5/18/2023.  Considered benign.  Macromastia  Encounter for screening breast exam  Family history of breast cancer - mother dx78 and paternal cousin      PLAN:   Resume annual screening mammogram with tomosynthesis.  Due 6/2025  Miconazole powder sent to pharmacy   Trial Interdry moisture wicking fabric with antimicrobial silver - apply daily to intramammary folds, change if dressing if soiled and reapply.   Certified bra fitter recommendations provided   In the past, we reviewed recommendations to decrease risk for breast cancer through daily healthy lifestyle changes.  Recommendation to start exercising routinely.  Discussed the importance of breast awareness including the importance and technique of self breast exams  Most recent imaging breast imaging was reviewed and the results were discussed with the patient, all questions answered  Education provided for Healthy Lifestyle Recommendations: healthy diet, routine exercise, weight control, decreased alcohol consumption.  Follow up after mammogram          SYLVESTER Mills-CNP  [facility]   Surgical Breast Oncology   [phone]    All of the patient's questions were answered at this time however, she was encouraged to call the office with any further

## 2024-07-22 ENCOUNTER — OFFICE VISIT (OUTPATIENT)
Dept: FAMILY MEDICINE CLINIC | Age: 59
End: 2024-07-22
Payer: COMMERCIAL

## 2024-07-22 VITALS
WEIGHT: 224 LBS | DIASTOLIC BLOOD PRESSURE: 70 MMHG | TEMPERATURE: 97.7 F | SYSTOLIC BLOOD PRESSURE: 110 MMHG | OXYGEN SATURATION: 98 % | BODY MASS INDEX: 33.08 KG/M2 | HEART RATE: 90 BPM | RESPIRATION RATE: 16 BRPM

## 2024-07-22 DIAGNOSIS — F98.8 ATTENTION DEFICIT DISORDER (ADD) WITHOUT HYPERACTIVITY: ICD-10-CM

## 2024-07-22 DIAGNOSIS — I10 ESSENTIAL HYPERTENSION: ICD-10-CM

## 2024-07-22 DIAGNOSIS — N62 MACROMASTIA: ICD-10-CM

## 2024-07-22 DIAGNOSIS — M05.79 RHEUMATOID ARTHRITIS INVOLVING MULTIPLE SITES WITH POSITIVE RHEUMATOID FACTOR (HCC): Primary | ICD-10-CM

## 2024-07-22 DIAGNOSIS — B37.9 CANDIDA INFECTION: ICD-10-CM

## 2024-07-22 PROCEDURE — 3078F DIAST BP <80 MM HG: CPT | Performed by: FAMILY MEDICINE

## 2024-07-22 PROCEDURE — 99214 OFFICE O/P EST MOD 30 MIN: CPT | Performed by: FAMILY MEDICINE

## 2024-07-22 PROCEDURE — G2211 COMPLEX E/M VISIT ADD ON: HCPCS | Performed by: FAMILY MEDICINE

## 2024-07-22 PROCEDURE — 3074F SYST BP LT 130 MM HG: CPT | Performed by: FAMILY MEDICINE

## 2024-07-22 RX ORDER — DEXTROAMPHETAMINE SACCHARATE, AMPHETAMINE ASPARTATE MONOHYDRATE, DEXTROAMPHETAMINE SULFATE AND AMPHETAMINE SULFATE 6.25; 6.25; 6.25; 6.25 MG/1; MG/1; MG/1; MG/1
25 CAPSULE, EXTENDED RELEASE ORAL DAILY
Qty: 30 CAPSULE | Refills: 0 | Status: SHIPPED | OUTPATIENT
Start: 2024-09-20 | End: 2024-10-20

## 2024-07-22 RX ORDER — DEXTROAMPHETAMINE SACCHARATE, AMPHETAMINE ASPARTATE MONOHYDRATE, DEXTROAMPHETAMINE SULFATE AND AMPHETAMINE SULFATE 6.25; 6.25; 6.25; 6.25 MG/1; MG/1; MG/1; MG/1
25 CAPSULE, EXTENDED RELEASE ORAL DAILY
Qty: 30 CAPSULE | Refills: 0 | Status: SHIPPED | OUTPATIENT
Start: 2024-07-22 | End: 2024-08-21

## 2024-07-22 RX ORDER — DEXTROAMPHETAMINE SACCHARATE, AMPHETAMINE ASPARTATE MONOHYDRATE, DEXTROAMPHETAMINE SULFATE AND AMPHETAMINE SULFATE 2.5; 2.5; 2.5; 2.5 MG/1; MG/1; MG/1; MG/1
10 CAPSULE, EXTENDED RELEASE ORAL DAILY
Qty: 30 CAPSULE | Refills: 0 | Status: CANCELLED | OUTPATIENT
Start: 2024-07-22 | End: 2024-08-21

## 2024-07-22 RX ORDER — DEXTROAMPHETAMINE SACCHARATE, AMPHETAMINE ASPARTATE MONOHYDRATE, DEXTROAMPHETAMINE SULFATE AND AMPHETAMINE SULFATE 6.25; 6.25; 6.25; 6.25 MG/1; MG/1; MG/1; MG/1
25 CAPSULE, EXTENDED RELEASE ORAL DAILY
Qty: 30 CAPSULE | Refills: 0 | Status: SHIPPED | OUTPATIENT
Start: 2024-08-21 | End: 2024-09-20

## 2024-07-22 NOTE — PROGRESS NOTES
(ADDERALL XR) 25 MG extended release capsule Take 1 capsule by mouth daily for 30 days. Max Daily Amount: 25 mg 30 capsule 0    [START ON 8/21/2024] amphetamine-dextroamphetamine (ADDERALL XR) 25 MG extended release capsule Take 1 capsule by mouth daily for 30 days. Max Daily Amount: 25 mg 30 capsule 0    [START ON 9/20/2024] amphetamine-dextroamphetamine (ADDERALL XR) 25 MG extended release capsule Take 1 capsule by mouth daily for 30 days. Max Daily Amount: 25 mg 30 capsule 0    miconazole (ZEASORB-AF) 2 % powder Apply topically 2 times daily. 45 g 1    escitalopram (LEXAPRO) 10 MG tablet Take 1.5 tablets by mouth daily 135 tablet 3    amphetamine-dextroamphetamine (ADDERALL XR) 10 MG extended release capsule Take 1 capsule by mouth daily for 30 days. Max Daily Amount: 10 mg 30 capsule 0    abatacept (ORENCIA) 250 MG injection 250mg IV monthly 1 each 5    triamterene-hydroCHLOROthiazide (MAXZIDE) 75-50 MG per tablet TAKE ONE TABLET BY MOUTH ONCE A DAY 30 tablet 5    rosuvastatin (CRESTOR) 10 MG tablet TAKE ONE TABLET BY MOUTH EVERY NIGHT 90 tablet 1    tiZANidine (ZANAFLEX) 4 MG tablet Take 1 tablet by mouth every 8 hours as needed (muscle spasm) 30 tablet 5    ketoconazole (NIZORAL) 2 % cream Apply topically daily. 60 g 5    traZODone (DESYREL) 50 MG tablet TAKE ONE TO TWO TABLETS BY MOUTH NIGHTLY 180 tablet 3    ondansetron (ZOFRAN) 4 MG tablet Take 1 tablet by mouth daily as needed for Nausea or Vomiting 30 tablet 0    Omega-3 Fatty Acids (FISH OIL) 1000 MG CAPS Take 1 capsule by mouth daily      Multiple Vitamins-Minerals (THERAPEUTIC MULTIVITAMIN-MINERALS) tablet Take 1 tablet by mouth daily      hyoscyamine (ANASPAZ;LEVSIN) 125 MCG tablet Take 1 tablet by mouth every 4 hours as needed for Cramping      Probiotic Product (PROBIOTIC ADVANCED PO) Take 1 tablet by mouth daily       No current facility-administered medications for this visit.          ASSESSMENT / PLAN:    1. Attention deficit disorder (ADD)

## 2024-07-26 ENCOUNTER — OFFICE VISIT (OUTPATIENT)
Dept: SURGERY | Age: 59
End: 2024-07-26
Payer: COMMERCIAL

## 2024-07-26 VITALS
HEART RATE: 90 BPM | TEMPERATURE: 98.7 F | BODY MASS INDEX: 32.64 KG/M2 | WEIGHT: 221 LBS | OXYGEN SATURATION: 98 % | SYSTOLIC BLOOD PRESSURE: 110 MMHG | DIASTOLIC BLOOD PRESSURE: 70 MMHG

## 2024-07-26 DIAGNOSIS — N62 MACROMASTIA: Primary | ICD-10-CM

## 2024-07-26 PROCEDURE — 3074F SYST BP LT 130 MM HG: CPT

## 2024-07-26 PROCEDURE — 99214 OFFICE O/P EST MOD 30 MIN: CPT

## 2024-07-26 PROCEDURE — 3078F DIAST BP <80 MM HG: CPT

## 2024-07-26 NOTE — PROGRESS NOTES
[facility]    CC: Symptomatic macromastia    REFERRING PHYSICIAN: Reilly Canales MD    HPI: This is a 59 y.o.  female who presents to clinic with desire for breast reduction.  Her pertinent breast history include the following: Patient states she has persistent rashes under both breast bilateral in which she treats with anti fungal powder and prescription creams.     Last Mammogram: reviewed mammogram done 23    Current bra size: 36 H  Desired bra size:\"small as possible\"  Pregnancies/miscarriages: 2/0  Breast feeding: no future plans    Breast Symptoms:    Macromastia Symptoms:  Upper back pain: Yes      Bra strap grooves: Yes      Wears supportive bras (>1 yr): Yes      Tried conservative measures (PT, MDs,etc): Yes, PCP back pain, chiropractors, cervical laminectomy , monthly massage at [facility], she always was custom fit for bra to help with neck pain      Intertrigo: Yes      Head/neck pain: Yes      Headaches: Yes      Paresthesias of hands/fingers: Yes      PMHx:   Past Medical History:   Diagnosis Date    Abnormal Pap smear of cervix     Attention deficit disorder (ADD) without hyperactivity 2024    Autoimmune disorder (HCC)     Breast disorder     DDD (degenerative disc disease), cervical     resolved s/p surgery    Depression with anxiety     Endometriosis     Essential hypertension 2019    GERD (gastroesophageal reflux disease)     Hemangioma of liver 2022    Hypercholesterolemia     Hyperglycemia     Lumbar strain 2023    Menopause ovarian failure     Migraine     PONV (postoperative nausea and vomiting)     with general anesthesia    Spastic colon      PSHx:   Past Surgical History:   Procedure Laterality Date    APPENDECTOMY      CERVICAL LAMINECTOMY      CHOLECYSTECTOMY      COLONOSCOPY      COLONOSCOPY  2022    COLONOSCOPY POLYPECTOMY SNARE/COLD BIOPSY performed by Tonny Atkins MD at [facility] ENDOSCOPY    ENDOMETRIAL ABLATION

## 2024-07-29 ENCOUNTER — PREP FOR PROCEDURE (OUTPATIENT)
Dept: SURGERY | Age: 59
End: 2024-07-29

## 2024-07-29 DIAGNOSIS — N62 MACROMASTIA: ICD-10-CM

## 2024-07-29 RX ORDER — SODIUM CHLORIDE 9 MG/ML
INJECTION, SOLUTION INTRAVENOUS PRN
OUTPATIENT
Start: 2024-07-29

## 2024-07-29 RX ORDER — SODIUM CHLORIDE, SODIUM LACTATE, POTASSIUM CHLORIDE, CALCIUM CHLORIDE 600; 310; 30; 20 MG/100ML; MG/100ML; MG/100ML; MG/100ML
INJECTION, SOLUTION INTRAVENOUS CONTINUOUS
OUTPATIENT
Start: 2024-07-29

## 2024-07-29 RX ORDER — SODIUM CHLORIDE 0.9 % (FLUSH) 0.9 %
5-40 SYRINGE (ML) INJECTION PRN
OUTPATIENT
Start: 2024-07-29

## 2024-07-29 RX ORDER — SODIUM CHLORIDE 0.9 % (FLUSH) 0.9 %
5-40 SYRINGE (ML) INJECTION EVERY 12 HOURS SCHEDULED
OUTPATIENT
Start: 2024-07-29

## 2024-07-31 NOTE — TELEPHONE ENCOUNTER
NURSING DISCHARGE NOTE    Discharged Rehab facility via Ambulance.  Accompanied by Support staff  Belongings Taken by patient/family.        Discharge order in place. Pt was cleared for discharge by the hospitalist, nephrologist, and cardiologist. Discharge education, medications, and follow-up appointments reviewed with pt. All questions answered at the time of discharge. IVX2 removed. Report given to ALCON Costello at [place]. All questions asnwered at the time of discharge. Colostomy bag intact, stoma pink.      Specialty Medication Service    Date: 7/11/2023  Patient's Name: Peter Crump YOB: 1965            _____________________________________________________________________________________________    Patient no longer is taking SMS formulary medication (medication discontinued (Enbrel)as patient was not having nay benefit). Patient is no longer enrolled in SMS program. No further outreach planned at this time.     Salina GreshamD  Ambulatory Clinical Pharmacist   Specialty Medication Services   Phone: [phone] option 4  7/11/2023 10:46 AM    For Pharmacy Admin Tracking Only    Program: [facility]  Time Spent (min): 10

## 2024-08-01 ENCOUNTER — TELEPHONE (OUTPATIENT)
Dept: SURGERY | Age: 59
End: 2024-08-01

## 2024-08-01 NOTE — TELEPHONE ENCOUNTER
The patient was in the office to see Mindy 7-.    PLAN: Would benefit from bilateral breast reduction. Will submit to insurance and work to schedule.     I received a surgery letter.    I spoke with the patient at the home number listed to discuss needed medical records. I was able to print records from [employer] with patient agreement. The patient mentioned massage therapy, which we will work to obtain if we do not receive an approval. The patient was provided an insurance update.      Does patient need to see Dr. Burns prior to surgery?    I spoke with China PALACIOS at [employer] ([phone]) to see if CPT Code 55505 requires pre certification. The code does require pre certification, which I started during our call. I will upload clinicals and colored pictures to [employer] today via the provider portal. The information that I submit is saved on my computer.     Pending Case # [id number]    Date Range 9-1-2024 to     I will leave this phone note open.

## 2024-08-13 PROBLEM — N62 MACROMASTIA: Status: ACTIVE | Noted: 2024-07-29

## 2024-08-14 RX ORDER — TRAZODONE HYDROCHLORIDE 50 MG/1
TABLET ORAL
Qty: 180 TABLET | Refills: 3 | Status: SHIPPED | OUTPATIENT
Start: 2024-08-14

## 2024-08-14 NOTE — TELEPHONE ENCOUNTER
Requested Prescriptions     Pending Prescriptions Disp Refills    traZODone (DESYREL) 50 MG tablet [Pharmacy Med Name: TRAZODONE HCL 50MG TABS] 180 tablet 3     Sig: TAKE ONE TO TWO TABLETS BY MOUTH EVERY NIGHT          Last Office Visit: 7/22/2024     Next Office Visit: 8/29/2024

## 2024-08-27 ENCOUNTER — CLINICAL DOCUMENTATION (OUTPATIENT)
Dept: SPEECH THERAPY | Age: 59
End: 2024-08-27

## 2024-08-29 ENCOUNTER — OFFICE VISIT (OUTPATIENT)
Dept: FAMILY MEDICINE CLINIC | Age: 59
End: 2024-08-29
Payer: COMMERCIAL

## 2024-08-29 VITALS
BODY MASS INDEX: 33.36 KG/M2 | SYSTOLIC BLOOD PRESSURE: 132 MMHG | TEMPERATURE: 96.8 F | WEIGHT: 225.2 LBS | DIASTOLIC BLOOD PRESSURE: 80 MMHG | OXYGEN SATURATION: 96 % | HEART RATE: 101 BPM | HEIGHT: 69 IN

## 2024-08-29 DIAGNOSIS — J30.2 SEASONAL ALLERGIC RHINITIS, UNSPECIFIED TRIGGER: ICD-10-CM

## 2024-08-29 DIAGNOSIS — I10 ESSENTIAL HYPERTENSION: ICD-10-CM

## 2024-08-29 DIAGNOSIS — M05.79 RHEUMATOID ARTHRITIS INVOLVING MULTIPLE SITES WITH POSITIVE RHEUMATOID FACTOR (HCC): ICD-10-CM

## 2024-08-29 DIAGNOSIS — H02.403 PTOSIS OF BOTH EYELIDS: ICD-10-CM

## 2024-08-29 DIAGNOSIS — F98.8 ATTENTION DEFICIT DISORDER (ADD) WITHOUT HYPERACTIVITY: ICD-10-CM

## 2024-08-29 DIAGNOSIS — N62 MACROMASTIA: ICD-10-CM

## 2024-08-29 DIAGNOSIS — Z01.810 PREOP CARDIOVASCULAR EXAM: Primary | ICD-10-CM

## 2024-08-29 DIAGNOSIS — F43.9 STRESS: ICD-10-CM

## 2024-08-29 PROCEDURE — G2211 COMPLEX E/M VISIT ADD ON: HCPCS | Performed by: FAMILY MEDICINE

## 2024-08-29 PROCEDURE — 3075F SYST BP GE 130 - 139MM HG: CPT | Performed by: FAMILY MEDICINE

## 2024-08-29 PROCEDURE — 99214 OFFICE O/P EST MOD 30 MIN: CPT | Performed by: FAMILY MEDICINE

## 2024-08-29 PROCEDURE — 3079F DIAST BP 80-89 MM HG: CPT | Performed by: FAMILY MEDICINE

## 2024-08-29 RX ORDER — MONTELUKAST SODIUM 10 MG/1
10 TABLET ORAL DAILY
Qty: 30 TABLET | Refills: 5 | Status: SHIPPED | OUTPATIENT
Start: 2024-08-29

## 2024-08-29 NOTE — PROGRESS NOTES
negative  Hematologic/Lymphatic/Immunologic: negative  Endocrine: negative       Objective:      /80   Pulse (!) 101   Temp 96.8 °F (36 °C)   Ht 1.753 m (5' 9\")   Wt 102.2 kg (225 lb 3.2 oz)   SpO2 96%   BMI 33.26 kg/m²   General appearance - healthy, alert, no distress  Skin - Skin color, texture, turgor normal. No rashes or lesions.  Head - Normocephalic. No masses, lesions, tenderness or abnormalities  Eyes - conjunctivae/corneas clear. PERRL, EOM's intact.  Ears - External ears normal. Canals clear. TM's normal.  Nose/Sinuses - Nares normal. Septum midline. Mucosa normal. No drainage or sinus tenderness.  Oropharynx - Lips, mucosa, and tongue normal. Teeth and gums normal.   Neck - Neck supple. No adenopathy. Thyroid symmetric, normal size,  Back - Back symmetric, no curvature. ROM normal. No CVA tenderness.  Lungs - Percussion normal. Good diaphragmatic excursion. Lungs clear  Heart - Regular rate and rhythm, with no rub, murmur or gallop noted.  Abdomen - Abdomen soft, non-tender. BS normal. No masses, organomegaly  Extremities - Extremities normal. No deformities, edema, or skin discoloration  Musculoskeletal - Spine ROM normal. Muscular strength intact.  Peripheral pulses - radial=4/4,, femoral=4/4, popliteal=4/4, dorsalis pedis=4/4,  Neuro - Gait normal. Reflexes normal and symmetric. Sensation grossly normal.  No focal weakness        ASSESSMENT / PLAN:    1. Preop cardiovascular exam  Set for surgery  Medically cleared for surgery.      2. Ptosis of both eyelids  Med cleared for surgery     3. Essential hypertension  Stable @ goal, controlled  Cont supportive therapy and monitor    4. Rheumatoid arthritis involving multiple sites with positive rheumatoid factor (HCC)  Stable with orencia  F/u with rheumatology    5. Attention deficit disorder (ADD) without hyperactivity  Stable with adderall XR 25mg qd prn  No refills given today    6. Manuel Gee with dr. Burns for consideration of

## 2024-09-23 RX ORDER — ROSUVASTATIN CALCIUM 10 MG/1
TABLET, COATED ORAL
Qty: 90 TABLET | Refills: 1 | Status: SHIPPED | OUTPATIENT
Start: 2024-09-23

## 2024-09-23 RX ORDER — TRIAMTERENE AND HYDROCHLOROTHIAZIDE 75; 50 MG/1; MG/1
TABLET ORAL
Qty: 90 TABLET | Refills: 1 | Status: SHIPPED | OUTPATIENT
Start: 2024-09-23

## 2024-10-16 ENCOUNTER — TELEPHONE (OUTPATIENT)
Dept: ORTHOPEDIC SURGERY | Age: 59
End: 2024-10-16

## 2024-10-16 NOTE — TELEPHONE ENCOUNTER
[facility] CALLING.     THEY SENT A SYNVISC REQUEST FOR REFILL, AND HAVEN'T HEARD BACK ON THAT YET.    IS THE PHARMACY OK TO REFILL THE SYNVISC FOR THE Pt?     PLEASE LET PHARMACY KNOW     [facility] - [facility] Delivery - [place], [place] - [address], [address] - P [phone] - F [phone]

## 2024-10-18 ENCOUNTER — OFFICE VISIT (OUTPATIENT)
Dept: SURGERY | Age: 59
End: 2024-10-18
Payer: COMMERCIAL

## 2024-10-18 VITALS
HEART RATE: 101 BPM | DIASTOLIC BLOOD PRESSURE: 80 MMHG | BODY MASS INDEX: 33.33 KG/M2 | HEIGHT: 69 IN | WEIGHT: 225 LBS | SYSTOLIC BLOOD PRESSURE: 132 MMHG | RESPIRATION RATE: 16 BRPM

## 2024-10-18 DIAGNOSIS — N62 MACROMASTIA: Primary | ICD-10-CM

## 2024-10-18 PROCEDURE — 3079F DIAST BP 80-89 MM HG: CPT

## 2024-10-18 PROCEDURE — 3075F SYST BP GE 130 - 139MM HG: CPT

## 2024-10-18 PROCEDURE — 99214 OFFICE O/P EST MOD 30 MIN: CPT

## 2024-10-18 RX ORDER — NEOMYCIN SULFATE, POLYMYXIN B SULFATE, AND DEXAMETHASONE 3.5; 10000; 1 MG/G; [USP'U]/G; MG/G
OINTMENT OPHTHALMIC
COMMUNITY
Start: 2024-09-05

## 2024-10-18 NOTE — PROGRESS NOTES
Left   R  Ptosis rdgrdrrdarddrderd:rd rd3rd Palpable masses: No     Nipple retraction: No     Palpable axillary lymphadenopathy: No     SN-N: 29.5 cm     N-IMF: 15 cm     Breast width: 22 cm           L  Ptosis rdgrdrrdarddrderd:rd rd3rd Palpable masses: No     Nipple retraction: No     Palpable axillary lymphadenopathy: No     SN-N: 29 cm     N-IMF: 15 cm     Breast width: 19 cm         Estimated Reduction Volume (Schnur scale): 819 Grams per breast     RADIOLOGY: Mammogram reviewed.     IMP: 59 y.o. female with symptomatic macromastia  PLAN:  We discussed in depth pre-op and post op instructions and surgical plan. We discussed she is to stop all supplements and NSAIDS 7 days prior to surgery. We discussed the need to optimize protein to  grams per day following surgery. We discussed restrictions are to be in place 6 weeks which includes no lifting over 5 pounds, no pulling or pushing. No sweeping or running vacuum. We discussed TERRY drain care and potential for wound vac placement. We discussed she would have the breast drains for one week. We discussed the need for someone to be with patient following surgery. Patient agrees with surgical plan and wishes to proceed with scheduled surgery.       Sruthi Carballo APRN-CNP  [facility]  10/18/24

## 2024-10-22 ENCOUNTER — TELEPHONE (OUTPATIENT)
Dept: ORTHOPEDIC SURGERY | Age: 59
End: 2024-10-22

## 2024-10-23 ENCOUNTER — OFFICE VISIT (OUTPATIENT)
Dept: FAMILY MEDICINE CLINIC | Age: 59
End: 2024-10-23

## 2024-10-23 VITALS
BODY MASS INDEX: 33.71 KG/M2 | TEMPERATURE: 96.9 F | RESPIRATION RATE: 16 BRPM | DIASTOLIC BLOOD PRESSURE: 82 MMHG | SYSTOLIC BLOOD PRESSURE: 124 MMHG | WEIGHT: 228.4 LBS | OXYGEN SATURATION: 97 % | HEART RATE: 82 BPM

## 2024-10-23 DIAGNOSIS — N62 MACROMASTIA: ICD-10-CM

## 2024-10-23 DIAGNOSIS — F43.9 STRESS: ICD-10-CM

## 2024-10-23 DIAGNOSIS — Z01.810 PREOP CARDIOVASCULAR EXAM: ICD-10-CM

## 2024-10-23 DIAGNOSIS — M05.79 RHEUMATOID ARTHRITIS INVOLVING MULTIPLE SITES WITH POSITIVE RHEUMATOID FACTOR (HCC): ICD-10-CM

## 2024-10-23 DIAGNOSIS — F98.8 ATTENTION DEFICIT DISORDER (ADD) WITHOUT HYPERACTIVITY: ICD-10-CM

## 2024-10-23 DIAGNOSIS — Z01.810 PREOP CARDIOVASCULAR EXAM: Primary | ICD-10-CM

## 2024-10-23 DIAGNOSIS — F41.8 DEPRESSION WITH ANXIETY: ICD-10-CM

## 2024-10-23 DIAGNOSIS — I10 ESSENTIAL HYPERTENSION: ICD-10-CM

## 2024-10-23 LAB
APTT BLD: 29.7 SEC (ref 22.1–36.4)
INR PPP: 0.96 (ref 0.85–1.15)
PROTHROMBIN TIME: 13 SEC (ref 11.9–14.9)

## 2024-10-23 RX ORDER — LORAZEPAM 0.5 MG/1
0.5 TABLET ORAL EVERY 8 HOURS PRN
Qty: 30 TABLET | Refills: 0 | Status: SHIPPED | OUTPATIENT
Start: 2024-10-23 | End: 2024-11-22

## 2024-10-23 RX ORDER — DEXTROAMPHETAMINE SACCHARATE, AMPHETAMINE ASPARTATE MONOHYDRATE, DEXTROAMPHETAMINE SULFATE AND AMPHETAMINE SULFATE 5; 5; 5; 5 MG/1; MG/1; MG/1; MG/1
20 CAPSULE, EXTENDED RELEASE ORAL EVERY MORNING
Qty: 30 CAPSULE | Refills: 0 | Status: SHIPPED | OUTPATIENT
Start: 2024-10-23 | End: 2024-11-22

## 2024-10-23 NOTE — H&P (VIEW-ONLY)
Subjective:    Chief Complaint:     Lexie Isaacs is a 59 y.o. female who presents for a preoperative physical examination.  She is scheduled to have breast reduction surgery done by Dr. joya at [facility] on 11/7/2024.  Pt had dealt with chronic issues related to large breasts and due to persistent issues with upper shoulder pain, neck pain, skin rashes and lack of benefit with conservative therapy, pt will be getting set up for surgery as above.  Pt will be out for a minimum of 2 weeks, and possibly up to 6 weeks.  Pt is limited with lifting restriction for 4-6 weeks    Pt states that stress levels continue to be high with stress related to relationship with son.  Pt remains working with counseling and going monthly, but may increase as stress is very high. Pt continues to take lexapro and     Pt here for follow up of blood pressure.  Pt states doing great with adherence to therapy and feels well.  No issues of chest pain, shortness of breath.  No vision changes, headache, swelling in legs.     Here for follow up of ADD.  Pt states that they are doing very well with current therapy and feels that control of symptoms are adequate at this time.  No breakthru symptoms and no need/indication for adjustment in therapy.  Taking meds as prescribed and denies any illicit medication usage of misuse of their stimulant thearpy.  Mood is stable and denies any issues of depression or anxiety associated with treatment of ADD.   Pt has noted some jaw clenching and wonders if related    History of Present Illness:          Past Medical History:   Diagnosis Date    Abnormal Pap smear of cervix     Attention deficit disorder (ADD) without hyperactivity 06/21/2024    Autoimmune disorder (HCC)     Breast disorder     DDD (degenerative disc disease), cervical     resolved s/p surgery    Depression with anxiety     Endometriosis     Essential hypertension 06/20/2019    GERD (gastroesophageal reflux disease)     Hemangioma of liver

## 2024-10-23 NOTE — PROGRESS NOTES
Subjective:    Chief Complaint:     Lexie Isaacs is a 59 y.o. female who presents for a preoperative physical examination.  She is scheduled to have breast reduction surgery done by Dr. joya at [facility] on 11/7/2024.  Pt had dealt with chronic issues related to large breasts and due to persistent issues with upper shoulder pain, neck pain, skin rashes and lack of benefit with conservative therapy, pt will be getting set up for surgery as above.  Pt will be out for a minimum of 2 weeks, and possibly up to 6 weeks.  Pt is limited with lifting restriction for 4-6 weeks    Pt states that stress levels continue to be high with stress related to relationship with son.  Pt remains working with counseling and going monthly, but may increase as stress is very high. Pt continues to take lexapro and     Pt here for follow up of blood pressure.  Pt states doing great with adherence to therapy and feels well.  No issues of chest pain, shortness of breath.  No vision changes, headache, swelling in legs.     Here for follow up of ADD.  Pt states that they are doing very well with current therapy and feels that control of symptoms are adequate at this time.  No breakthru symptoms and no need/indication for adjustment in therapy.  Taking meds as prescribed and denies any illicit medication usage of misuse of their stimulant thearpy.  Mood is stable and denies any issues of depression or anxiety associated with treatment of ADD.   Pt has noted some jaw clenching and wonders if related    History of Present Illness:          Past Medical History:   Diagnosis Date    Abnormal Pap smear of cervix     Attention deficit disorder (ADD) without hyperactivity 06/21/2024    Autoimmune disorder (HCC)     Breast disorder     DDD (degenerative disc disease), cervical     resolved s/p surgery    Depression with anxiety     Endometriosis     Essential hypertension 06/20/2019    GERD (gastroesophageal reflux disease)     Hemangioma of liver

## 2024-10-24 LAB
ALBUMIN SERPL-MCNC: 4.6 G/DL (ref 3.4–5)
ALBUMIN/GLOB SERPL: 2.1 {RATIO} (ref 1.1–2.2)
ALP SERPL-CCNC: 89 U/L (ref 40–129)
ALT SERPL-CCNC: 30 U/L (ref 10–40)
ANION GAP SERPL CALCULATED.3IONS-SCNC: 12 MMOL/L (ref 3–16)
AST SERPL-CCNC: 22 U/L (ref 15–37)
BASOPHILS # BLD: 0 K/UL (ref 0–0.2)
BASOPHILS NFR BLD: 0.5 %
BILIRUB SERPL-MCNC: 0.3 MG/DL (ref 0–1)
BUN SERPL-MCNC: 18 MG/DL (ref 7–20)
CALCIUM SERPL-MCNC: 9.8 MG/DL (ref 8.3–10.6)
CHLORIDE SERPL-SCNC: 98 MMOL/L (ref 99–110)
CO2 SERPL-SCNC: 27 MMOL/L (ref 21–32)
CREAT SERPL-MCNC: 0.9 MG/DL (ref 0.6–1.1)
DEPRECATED RDW RBC AUTO: 13.3 % (ref 12.4–15.4)
EOSINOPHIL # BLD: 0.1 K/UL (ref 0–0.6)
EOSINOPHIL NFR BLD: 0.8 %
GFR SERPLBLD CREATININE-BSD FMLA CKD-EPI: 74 ML/MIN/{1.73_M2}
GLUCOSE SERPL-MCNC: 101 MG/DL (ref 70–99)
HCT VFR BLD AUTO: 45.7 % (ref 36–48)
HGB BLD-MCNC: 15.8 G/DL (ref 12–16)
LYMPHOCYTES # BLD: 2.1 K/UL (ref 1–5.1)
LYMPHOCYTES NFR BLD: 31.3 %
MCH RBC QN AUTO: 32.1 PG (ref 26–34)
MCHC RBC AUTO-ENTMCNC: 34.5 G/DL (ref 31–36)
MCV RBC AUTO: 93 FL (ref 80–100)
MONOCYTES # BLD: 0.4 K/UL (ref 0–1.3)
MONOCYTES NFR BLD: 6.8 %
NEUTROPHILS # BLD: 4 K/UL (ref 1.7–7.7)
NEUTROPHILS NFR BLD: 60.6 %
PLATELET # BLD AUTO: 224 K/UL (ref 135–450)
PMV BLD AUTO: 9.5 FL (ref 5–10.5)
POTASSIUM SERPL-SCNC: 3.7 MMOL/L (ref 3.5–5.1)
PROT SERPL-MCNC: 6.8 G/DL (ref 6.4–8.2)
RBC # BLD AUTO: 4.91 M/UL (ref 4–5.2)
SODIUM SERPL-SCNC: 137 MMOL/L (ref 136–145)
WBC # BLD AUTO: 6.6 K/UL (ref 4–11)

## 2024-10-30 ENCOUNTER — TELEPHONE (OUTPATIENT)
Dept: ORTHOPEDIC SURGERY | Age: 59
End: 2024-10-30

## 2024-10-30 NOTE — TELEPHONE ENCOUNTER
[facility]  [facility]. [phone] OPT 2.

## 2024-11-06 ENCOUNTER — PREP FOR PROCEDURE (OUTPATIENT)
Dept: SURGERY | Age: 59
End: 2024-11-06

## 2024-11-06 RX ORDER — SODIUM CHLORIDE 9 MG/ML
INJECTION, SOLUTION INTRAVENOUS PRN
Status: CANCELLED | OUTPATIENT
Start: 2024-11-07

## 2024-11-06 RX ORDER — SODIUM CHLORIDE, SODIUM LACTATE, POTASSIUM CHLORIDE, CALCIUM CHLORIDE 600; 310; 30; 20 MG/100ML; MG/100ML; MG/100ML; MG/100ML
INJECTION, SOLUTION INTRAVENOUS CONTINUOUS
Status: CANCELLED | OUTPATIENT
Start: 2024-11-06

## 2024-11-06 RX ORDER — SODIUM CHLORIDE 0.9 % (FLUSH) 0.9 %
5-40 SYRINGE (ML) INJECTION PRN
Status: CANCELLED | OUTPATIENT
Start: 2024-11-07

## 2024-11-06 RX ORDER — SODIUM CHLORIDE 0.9 % (FLUSH) 0.9 %
5-40 SYRINGE (ML) INJECTION EVERY 12 HOURS SCHEDULED
Status: CANCELLED | OUTPATIENT
Start: 2024-11-07

## 2024-11-07 ENCOUNTER — HOSPITAL ENCOUNTER (OUTPATIENT)
Age: 59
Setting detail: OUTPATIENT SURGERY
Discharge: HOME | End: 2024-11-07
Attending: SURGERY | Admitting: SURGERY
Payer: COMMERCIAL

## 2024-11-07 ENCOUNTER — ANESTHESIA (OUTPATIENT)
Dept: OPERATING ROOM | Age: 59
End: 2024-11-07
Payer: COMMERCIAL

## 2024-11-07 ENCOUNTER — ANESTHESIA EVENT (OUTPATIENT)
Dept: OPERATING ROOM | Age: 59
End: 2024-11-07
Payer: COMMERCIAL

## 2024-11-07 VITALS
HEART RATE: 78 BPM | DIASTOLIC BLOOD PRESSURE: 68 MMHG | OXYGEN SATURATION: 95 % | HEIGHT: 69 IN | BODY MASS INDEX: 33.74 KG/M2 | WEIGHT: 227.8 LBS | RESPIRATION RATE: 14 BRPM | SYSTOLIC BLOOD PRESSURE: 131 MMHG | TEMPERATURE: 97.2 F

## 2024-11-07 DIAGNOSIS — N62 MACROMASTIA: ICD-10-CM

## 2024-11-07 PROCEDURE — 6360000002 HC RX W HCPCS: Performed by: ANESTHESIOLOGY

## 2024-11-07 PROCEDURE — 6370000000 HC RX 637 (ALT 250 FOR IP): Performed by: SURGERY

## 2024-11-07 PROCEDURE — 2500000003 HC RX 250 WO HCPCS: Performed by: NURSE ANESTHETIST, CERTIFIED REGISTERED

## 2024-11-07 PROCEDURE — C1729 CATH, DRAINAGE: HCPCS | Performed by: SURGERY

## 2024-11-07 PROCEDURE — 2580000003 HC RX 258: Performed by: SURGERY

## 2024-11-07 PROCEDURE — 7100000011 HC PHASE II RECOVERY - ADDTL 15 MIN: Performed by: SURGERY

## 2024-11-07 PROCEDURE — 3700000000 HC ANESTHESIA ATTENDED CARE: Performed by: SURGERY

## 2024-11-07 PROCEDURE — 7100000001 HC PACU RECOVERY - ADDTL 15 MIN: Performed by: SURGERY

## 2024-11-07 PROCEDURE — 6360000002 HC RX W HCPCS: Performed by: SURGERY

## 2024-11-07 PROCEDURE — 2580000003 HC RX 258: Performed by: ANESTHESIOLOGY

## 2024-11-07 PROCEDURE — 6370000000 HC RX 637 (ALT 250 FOR IP): Performed by: ANESTHESIOLOGY

## 2024-11-07 PROCEDURE — 7100000010 HC PHASE II RECOVERY - FIRST 15 MIN: Performed by: SURGERY

## 2024-11-07 PROCEDURE — 2709999900 HC NON-CHARGEABLE SUPPLY: Performed by: SURGERY

## 2024-11-07 PROCEDURE — 2500000003 HC RX 250 WO HCPCS: Performed by: SURGERY

## 2024-11-07 PROCEDURE — 3600000004 HC SURGERY LEVEL 4 BASE: Performed by: SURGERY

## 2024-11-07 PROCEDURE — A4217 STERILE WATER/SALINE, 500 ML: HCPCS | Performed by: SURGERY

## 2024-11-07 PROCEDURE — 6360000002 HC RX W HCPCS: Performed by: NURSE ANESTHETIST, CERTIFIED REGISTERED

## 2024-11-07 PROCEDURE — 2720000010 HC SURG SUPPLY STERILE: Performed by: SURGERY

## 2024-11-07 PROCEDURE — 3700000001 HC ADD 15 MINUTES (ANESTHESIA): Performed by: SURGERY

## 2024-11-07 PROCEDURE — 3600000014 HC SURGERY LEVEL 4 ADDTL 15MIN: Performed by: SURGERY

## 2024-11-07 PROCEDURE — 7100000000 HC PACU RECOVERY - FIRST 15 MIN: Performed by: SURGERY

## 2024-11-07 RX ORDER — MIDAZOLAM HYDROCHLORIDE 1 MG/ML
INJECTION, SOLUTION INTRAMUSCULAR; INTRAVENOUS
Status: DISCONTINUED | OUTPATIENT
Start: 2024-11-07 | End: 2024-11-07 | Stop reason: SDUPTHER

## 2024-11-07 RX ORDER — MAGNESIUM HYDROXIDE 1200 MG/15ML
LIQUID ORAL CONTINUOUS PRN
Status: DISCONTINUED | OUTPATIENT
Start: 2024-11-07 | End: 2024-11-07 | Stop reason: HOSPADM

## 2024-11-07 RX ORDER — NALOXONE HYDROCHLORIDE 0.4 MG/ML
INJECTION, SOLUTION INTRAMUSCULAR; INTRAVENOUS; SUBCUTANEOUS PRN
Status: DISCONTINUED | OUTPATIENT
Start: 2024-11-07 | End: 2024-11-07 | Stop reason: HOSPADM

## 2024-11-07 RX ORDER — PROPOFOL 10 MG/ML
INJECTION, EMULSION INTRAVENOUS
Status: DISCONTINUED | OUTPATIENT
Start: 2024-11-07 | End: 2024-11-07 | Stop reason: SDUPTHER

## 2024-11-07 RX ORDER — HYDROCODONE BITARTRATE AND ACETAMINOPHEN 5; 325 MG/1; MG/1
1 TABLET ORAL EVERY 4 HOURS PRN
Qty: 42 TABLET | Refills: 0 | Status: SHIPPED | OUTPATIENT
Start: 2024-11-07 | End: 2024-11-14

## 2024-11-07 RX ORDER — HYDROMORPHONE HYDROCHLORIDE 2 MG/ML
INJECTION, SOLUTION INTRAMUSCULAR; INTRAVENOUS; SUBCUTANEOUS
Status: DISCONTINUED | OUTPATIENT
Start: 2024-11-07 | End: 2024-11-07 | Stop reason: SDUPTHER

## 2024-11-07 RX ORDER — CEPHALEXIN 500 MG/1
500 CAPSULE ORAL 4 TIMES DAILY
Qty: 40 CAPSULE | Refills: 0 | Status: SHIPPED | OUTPATIENT
Start: 2024-11-07 | End: 2024-11-17

## 2024-11-07 RX ORDER — SODIUM CHLORIDE, SODIUM LACTATE, POTASSIUM CHLORIDE, CALCIUM CHLORIDE 600; 310; 30; 20 MG/100ML; MG/100ML; MG/100ML; MG/100ML
INJECTION, SOLUTION INTRAVENOUS CONTINUOUS
Status: DISCONTINUED | OUTPATIENT
Start: 2024-11-07 | End: 2024-11-07 | Stop reason: HOSPADM

## 2024-11-07 RX ORDER — SODIUM CHLORIDE 0.9 % (FLUSH) 0.9 %
5-40 SYRINGE (ML) INJECTION EVERY 12 HOURS SCHEDULED
Status: DISCONTINUED | OUTPATIENT
Start: 2024-11-07 | End: 2024-11-07 | Stop reason: HOSPADM

## 2024-11-07 RX ORDER — MEPERIDINE HYDROCHLORIDE 25 MG/ML
12.5 INJECTION INTRAMUSCULAR; INTRAVENOUS; SUBCUTANEOUS EVERY 5 MIN PRN
Status: DISCONTINUED | OUTPATIENT
Start: 2024-11-07 | End: 2024-11-07 | Stop reason: HOSPADM

## 2024-11-07 RX ORDER — SODIUM CHLORIDE 0.9 % (FLUSH) 0.9 %
5-40 SYRINGE (ML) INJECTION PRN
Status: DISCONTINUED | OUTPATIENT
Start: 2024-11-07 | End: 2024-11-07 | Stop reason: HOSPADM

## 2024-11-07 RX ORDER — LIDOCAINE HYDROCHLORIDE 20 MG/ML
INJECTION, SOLUTION INTRAVENOUS
Status: DISCONTINUED | OUTPATIENT
Start: 2024-11-07 | End: 2024-11-07 | Stop reason: SDUPTHER

## 2024-11-07 RX ORDER — LABETALOL HYDROCHLORIDE 5 MG/ML
10 INJECTION, SOLUTION INTRAVENOUS
Status: DISCONTINUED | OUTPATIENT
Start: 2024-11-07 | End: 2024-11-07 | Stop reason: HOSPADM

## 2024-11-07 RX ORDER — METOCLOPRAMIDE HYDROCHLORIDE 5 MG/ML
10 INJECTION INTRAMUSCULAR; INTRAVENOUS
Status: COMPLETED | OUTPATIENT
Start: 2024-11-07 | End: 2024-11-07

## 2024-11-07 RX ORDER — HYDROMORPHONE HYDROCHLORIDE 1 MG/ML
0.5 INJECTION, SOLUTION INTRAMUSCULAR; INTRAVENOUS; SUBCUTANEOUS EVERY 10 MIN PRN
Status: DISCONTINUED | OUTPATIENT
Start: 2024-11-07 | End: 2024-11-07 | Stop reason: HOSPADM

## 2024-11-07 RX ORDER — HYDRALAZINE HYDROCHLORIDE 20 MG/ML
10 INJECTION INTRAMUSCULAR; INTRAVENOUS
Status: DISCONTINUED | OUTPATIENT
Start: 2024-11-07 | End: 2024-11-07 | Stop reason: HOSPADM

## 2024-11-07 RX ORDER — ROCURONIUM BROMIDE 10 MG/ML
INJECTION, SOLUTION INTRAVENOUS
Status: DISCONTINUED | OUTPATIENT
Start: 2024-11-07 | End: 2024-11-07 | Stop reason: SDUPTHER

## 2024-11-07 RX ORDER — DEXMEDETOMIDINE HYDROCHLORIDE 100 UG/ML
INJECTION, SOLUTION INTRAVENOUS
Status: DISCONTINUED | OUTPATIENT
Start: 2024-11-07 | End: 2024-11-07 | Stop reason: SDUPTHER

## 2024-11-07 RX ORDER — HYDROMORPHONE HYDROCHLORIDE 1 MG/ML
0.5 INJECTION, SOLUTION INTRAMUSCULAR; INTRAVENOUS; SUBCUTANEOUS EVERY 5 MIN PRN
Status: DISCONTINUED | OUTPATIENT
Start: 2024-11-07 | End: 2024-11-07 | Stop reason: HOSPADM

## 2024-11-07 RX ORDER — HYDROCODONE BITARTRATE AND ACETAMINOPHEN 5; 325 MG/1; MG/1
1 TABLET ORAL
Status: COMPLETED | OUTPATIENT
Start: 2024-11-07 | End: 2024-11-07

## 2024-11-07 RX ORDER — SODIUM CHLORIDE 9 MG/ML
INJECTION, SOLUTION INTRAVENOUS PRN
Status: DISCONTINUED | OUTPATIENT
Start: 2024-11-07 | End: 2024-11-07 | Stop reason: HOSPADM

## 2024-11-07 RX ORDER — DIPHENHYDRAMINE HYDROCHLORIDE 50 MG/ML
12.5 INJECTION INTRAMUSCULAR; INTRAVENOUS
Status: DISCONTINUED | OUTPATIENT
Start: 2024-11-07 | End: 2024-11-07 | Stop reason: HOSPADM

## 2024-11-07 RX ORDER — ONDANSETRON 2 MG/ML
4 INJECTION INTRAMUSCULAR; INTRAVENOUS ONCE
Status: COMPLETED | OUTPATIENT
Start: 2024-11-07 | End: 2024-11-07

## 2024-11-07 RX ORDER — APREPITANT 40 MG/1
40 CAPSULE ORAL ONCE
Status: COMPLETED | OUTPATIENT
Start: 2024-11-07 | End: 2024-11-07

## 2024-11-07 RX ORDER — FENTANYL CITRATE 50 UG/ML
INJECTION, SOLUTION INTRAMUSCULAR; INTRAVENOUS
Status: DISCONTINUED | OUTPATIENT
Start: 2024-11-07 | End: 2024-11-07 | Stop reason: SDUPTHER

## 2024-11-07 RX ORDER — SODIUM CHLORIDE, SODIUM LACTATE, POTASSIUM CHLORIDE, CALCIUM CHLORIDE 600; 310; 30; 20 MG/100ML; MG/100ML; MG/100ML; MG/100ML
INJECTION, SOLUTION INTRAVENOUS CONTINUOUS
Status: DISCONTINUED | OUTPATIENT
Start: 2024-11-07 | End: 2024-11-07 | Stop reason: SDUPTHER

## 2024-11-07 RX ORDER — SCOLOPAMINE TRANSDERMAL SYSTEM 1 MG/1
1 PATCH, EXTENDED RELEASE TRANSDERMAL ONCE
Status: DISCONTINUED | OUTPATIENT
Start: 2024-11-07 | End: 2024-11-07 | Stop reason: HOSPADM

## 2024-11-07 RX ADMIN — LIDOCAINE HYDROCHLORIDE 100 MG: 20 INJECTION, SOLUTION INTRAVENOUS at 07:31

## 2024-11-07 RX ADMIN — PROPOFOL 200 MG: 10 INJECTION, EMULSION INTRAVENOUS at 07:31

## 2024-11-07 RX ADMIN — MIDAZOLAM HYDROCHLORIDE 2 MG: 2 INJECTION, SOLUTION INTRAMUSCULAR; INTRAVENOUS at 07:21

## 2024-11-07 RX ADMIN — DEXMEDETOMIDINE HYDROCHLORIDE 8 MCG: 100 INJECTION, SOLUTION INTRAVENOUS at 08:57

## 2024-11-07 RX ADMIN — APREPITANT 40 MG: 40 CAPSULE ORAL at 07:15

## 2024-11-07 RX ADMIN — ROCURONIUM BROMIDE 50 MG: 10 INJECTION, SOLUTION INTRAVENOUS at 07:31

## 2024-11-07 RX ADMIN — HYDROCODONE BITARTRATE AND ACETAMINOPHEN 1 TABLET: 5; 325 TABLET ORAL at 12:57

## 2024-11-07 RX ADMIN — HYDROMORPHONE HYDROCHLORIDE 0.5 MG: 2 INJECTION, SOLUTION INTRAMUSCULAR; INTRAVENOUS; SUBCUTANEOUS at 09:01

## 2024-11-07 RX ADMIN — WATER 2000 MG: 1 INJECTION INTRAMUSCULAR; INTRAVENOUS; SUBCUTANEOUS at 07:35

## 2024-11-07 RX ADMIN — FENTANYL CITRATE 50 MCG: 50 INJECTION, SOLUTION INTRAMUSCULAR; INTRAVENOUS at 07:21

## 2024-11-07 RX ADMIN — FENTANYL CITRATE 50 MCG: 50 INJECTION, SOLUTION INTRAMUSCULAR; INTRAVENOUS at 07:44

## 2024-11-07 RX ADMIN — SODIUM CHLORIDE, POTASSIUM CHLORIDE, SODIUM LACTATE AND CALCIUM CHLORIDE: 600; 310; 30; 20 INJECTION, SOLUTION INTRAVENOUS at 07:04

## 2024-11-07 RX ADMIN — ONDANSETRON 4 MG: 2 INJECTION INTRAMUSCULAR; INTRAVENOUS at 13:40

## 2024-11-07 RX ADMIN — SUGAMMADEX 200 MG: 100 INJECTION, SOLUTION INTRAVENOUS at 09:32

## 2024-11-07 RX ADMIN — HYDROMORPHONE HYDROCHLORIDE 0.5 MG: 2 INJECTION, SOLUTION INTRAMUSCULAR; INTRAVENOUS; SUBCUTANEOUS at 09:19

## 2024-11-07 RX ADMIN — METOCLOPRAMIDE HYDROCHLORIDE 10 MG: 5 INJECTION INTRAMUSCULAR; INTRAVENOUS at 10:44

## 2024-11-07 RX ADMIN — DEXMEDETOMIDINE HYDROCHLORIDE 8 MCG: 100 INJECTION, SOLUTION INTRAVENOUS at 09:19

## 2024-11-07 RX ADMIN — TRANEXAMIC ACID 1000 MG: 100 INJECTION, SOLUTION INTRAVENOUS at 07:34

## 2024-11-07 ASSESSMENT — PAIN DESCRIPTION - ONSET
ONSET: ON-GOING
ONSET: ON-GOING

## 2024-11-07 ASSESSMENT — PAIN DESCRIPTION - DESCRIPTORS
DESCRIPTORS: SORE
DESCRIPTORS: BURNING
DESCRIPTORS: BURNING

## 2024-11-07 ASSESSMENT — PAIN SCALES - GENERAL
PAINLEVEL_OUTOF10: 3
PAINLEVEL_OUTOF10: 0
PAINLEVEL_OUTOF10: 4
PAINLEVEL_OUTOF10: 6

## 2024-11-07 ASSESSMENT — PAIN DESCRIPTION - FREQUENCY
FREQUENCY: CONTINUOUS
FREQUENCY: INTERMITTENT

## 2024-11-07 ASSESSMENT — PAIN DESCRIPTION - LOCATION
LOCATION: CHEST
LOCATION: BREAST
LOCATION: BREAST

## 2024-11-07 ASSESSMENT — PAIN - FUNCTIONAL ASSESSMENT: PAIN_FUNCTIONAL_ASSESSMENT: ACTIVITIES ARE NOT PREVENTED

## 2024-11-07 ASSESSMENT — PAIN DESCRIPTION - ORIENTATION
ORIENTATION: RIGHT;LEFT
ORIENTATION: RIGHT;LEFT
ORIENTATION: RIGHT;LEFT;ANTERIOR

## 2024-11-07 ASSESSMENT — PAIN DESCRIPTION - PAIN TYPE
TYPE: SURGICAL PAIN
TYPE: SURGICAL PAIN

## 2024-11-07 NOTE — BRIEF OP NOTE
Brief Postoperative Note      Patient: Lexie Isaacs  YOB: 1965  MRN: [id number]    Date of Procedure: 11/7/2024    Pre-Op Diagnosis Codes:      * Macromastia [N62]    Post-Op Diagnosis: Same       Procedure(s):  Bilateral BREAST REDUCTION    Surgeon(s):  Maverick Burns MD    Assistant:  First Assistant: Bridgett Marshall    Anesthesia: General    Estimated Blood Loss (mL): less than 50     Complications: None    Specimens:   ID Type Source Tests Collected by Time Destination   A : RIGHT BREAST TISSUE Tissue Tissue SURGICAL PATHOLOGY Maverick Burns MD 11/7/2024 0821    B : LEFT BREAST TISSUE Tissue Tissue SURGICAL PATHOLOGY Maverick Burns MD 11/7/2024 0821        Implants:  * No implants in log *      Drains:   Closed/Suction Drain Lateral RUQ Bulb (Active)   Dressing Status Other (Comment) 11/07/24 0913       Closed/Suction Drain Lateral LUQ Bulb (Active)   Site Description Clean, dry & intact 11/07/24 0913   Dressing Status Other (Comment) 11/07/24 0913       Findings:  Infection Present At Time Of Surgery (PATOS) (choose all levels that have infection present):  No infection present  Other Findings: R: 830.1; L 737.6    Electronically signed by Maverick Burns MD on 11/7/2024 at 9:39 AM

## 2024-11-07 NOTE — PROGRESS NOTES
11/1/2024 1159 AM:      PREP FOR PROCEDURE INSTRUCTIONS/TS:      11/1/2024 1217 PM:    TI COMPLETED/TS     PRE-OP ORDERS/CONSENT TO BE ENTERED BY SURGEON'S OFFICE/TS.    
11/6/24 @ 0823 Yelitza garzon pt needs pre op orders and consent  /NR  
Ambulatory Surgery/Procedure Discharge Note    Vitals:    11/07/24 1215   BP: 131/68   Pulse: 78   Resp: 14   Temp: 97.2 °F (36.2 °C)   SpO2: 95%   Discharge criteria met per Wilda score.     In: 1960 [P.O.:360; I.V.:1600]  Out: 40 [Drains:15]    Restroom use offered before discharge.  Yes. Patient voided with difficulty.    Pain assessment:  none  Pain Level: 4, tolerable and improving  Zofran given to nausea. Nausea improved upon discharge.     Patient discharged to home/self care. Patient discharged via wheel chair by transporter to waiting family/S.O.       11/7/2024 1:49 PMPt and S.O./family states \"ready to go home\". Pt alert and oriented x4. IV removed. Denies N/V and pain improving. Dressing/Surgical bra intact. TERRY (X2) drains emptied/dressing intact. Pt tolerating po intake. Discharge instructions given to pt and friend Janet with pt permission. Pt and Janet verbalized understanding of all instructions. Left with all belongings and discharge instructions. Supplies given: surgical bra, safety pins, dressing changes/guaze, alcohol pads, recording sheet    
PACU Transfer to [facility]    Vitals:    11/07/24 1209   BP: 133/74   Pulse: 81   Resp: 15   Temp: 96.8   SpO2: 92%     Patient denies nausea at this time.    Intake/Output Summary (Last 24 hours) at 11/7/2024 1225  Last data filed at 11/7/2024 1209  Gross per 24 hour   Intake 1840 ml   Output 25 ml   Net 1815 ml       Pain assessment:  patient wants PO dose of Norco before discharge. IV will make too sleepy and nauseated  Pain level 3-4/10    Patient transferred to care of [facility] RN. Patient transported to [facility] by Julien the transporter.    11/7/2024 12:25 PM    
Patient awakens to voice, following commands. Oral airway removed. Patient tolerated well.  
Patient brought to PACU #3 from the OR with an oral airway in place. Patient placed on monitors. Report received from RN and CRNA. No signs of discomfort at this time.  
Patient tolerating PO. Denies nausea.  
Patient's family called and updated. All questions answered.  
Pt A and O. IV started and infusing LR.  
PM      ADDITIONAL EDUCATIONAL INFORMATION REVIEWED PER PHONE WITH YOU AND/OR YOUR FAMILY:  No Hibiclens® Bathing Instructions   Yes Antibacterial Soap

## 2024-11-07 NOTE — ANESTHESIA PRE PROCEDURE
Department of Anesthesiology  Preprocedure Note       Name:  Lexie Isaacs   Age:  59 y.o.  :  1965                                          MRN:  [id number]         Date:  2024      Surgeon: Surgeon(s):  Maverick Burns MD    Procedure: Procedure(s):  Bilateral BREAST REDUCTION    Medications prior to admission:   Prior to Admission medications    Medication Sig Start Date End Date Taking? Authorizing Provider   LORazepam (ATIVAN) 0.5 MG tablet Take 1 tablet by mouth every 8 hours as needed for Anxiety for up to 30 days. Max Daily Amount: 1.5 mg 10/23/24 11/22/24 Yes Reilly Canales MD   rosuvastatin (CRESTOR) 10 MG tablet TAKE ONE TABLET BY MOUTH EVERY EVENING AT BEDTIME  Patient taking differently: Take 1 tablet by mouth daily TAKE ONE TABLET BY MOUTH EVERY EVENING AT BEDTIME 24  Yes Reilly Canales MD   triamterene-hydroCHLOROthiazide (MAXZIDE) 75-50 MG per tablet TAKE ONE TABLET BY MOUTH ONCE A DAY 24  Yes Reilly Canales MD   montelukast (SINGULAIR) 10 MG tablet Take 1 tablet by mouth daily 24  Yes Reilly Canales MD   traZODone (DESYREL) 50 MG tablet TAKE ONE TO TWO TABLETS BY MOUTH EVERY NIGHT 24  Yes Reilly Canales MD   escitalopram (LEXAPRO) 10 MG tablet Take 1.5 tablets by mouth daily  Patient taking differently: Take 1.5 tablets by mouth daily TAKES 15 MG DAILY 24  Yes Reilly Canales MD   ondansetron (ZOFRAN) 4 MG tablet Take 1 tablet by mouth daily as needed for Nausea or Vomiting 3/21/23  Yes Reilly Canales MD   Omega-3 Fatty Acids (FISH OIL) 1000 MG CAPS Take 1 capsule by mouth daily   Yes Nina Castillo MD   Multiple Vitamins-Minerals (THERAPEUTIC MULTIVITAMIN-MINERALS) tablet Take 1 tablet by mouth daily   Yes Nina Castillo MD   Probiotic Product (PROBIOTIC ADVANCED PO) Take 1 tablet by mouth daily   Yes Nina Castillo MD   amphetamine-dextroamphetamine (ADDERALL XR) 20 MG extended release capsule Take 1 capsule

## 2024-11-07 NOTE — ANESTHESIA POSTPROCEDURE EVALUATION
Department of Anesthesiology  Postprocedure Note    Patient: Lexie Isaacs  MRN: [id number]  YOB: 1965  Date of evaluation: 11/7/2024    Procedure Summary       Date: 11/07/24 Room / Location: [address]    Anesthesia Start: 0723 Anesthesia Stop: 0947    Procedure: Bilateral BREAST REDUCTION (Bilateral: Breast) Diagnosis:       Macromastia      (Macromastia [N62])    Surgeons: Maverick Burns MD Responsible Provider: Tevin English MD    Anesthesia Type: general ASA Status: 2            Anesthesia Type: No value filed.    Wilda Phase I: Wilda Score: 4    Wilda Phase II:      Anesthesia Post Evaluation    Patient location during evaluation: PACU  Patient participation: complete - patient participated  Level of consciousness: awake and alert  Pain score: 4  Airway patency: patent  Nausea & Vomiting: no nausea and no vomiting  Cardiovascular status: hemodynamically stable  Respiratory status: acceptable  Hydration status: euvolemic  Pain management: adequate    No notable events documented.

## 2024-11-07 NOTE — DISCHARGE INSTRUCTIONS
instructions  []Medication information sheet(S) including potential side effects  []Kassandra’s egress test  []Pain Ball management  []FAQ Catheter associated blood stream infections  [x]FAQ Surgical Site Infections  []Other-    I have read and understand the instructions given to me: ____________________________________________   (Patient/S.O. Signature)            Date/time 11/7/2024 9:04 AM                 If you smoke STOP. We care about your health!

## 2024-11-07 NOTE — OP NOTE
[facility] PLASTIC & RECONSTRUCTIVE SURGERY     OPERATIVE DICTATION    NAME: Lexie Isaacs   MRN: [id number]  DATE: 11/7/2024     AGE: 59 y.o.    PREOPERATIVE DIAGNOSIS:  Macromastia.     POSTOPERATIVE DIAGNOSIS:  Macromastia.     OPERATION PERFORMED:  Bilateral breast reduction.     SURGEON:  Maverick Burns MD     ASSISTANT:  Bridgett Dukes).     ANESTHESIA:  General.     ESTIMATED BLOOD LOSS:  75mL.     DRAINS:  2 (1 each breast).     SPECIMENS:  Breast tissue (right breast 830.1 gm, left breast 737.6 gm).     OPERATIVE INDICATIONS:  This is an 59 y.o.-year-old female with a history of symptomatic macromastia.  She desired breast reduction to alleviate her symptoms. She was taken off her rheumatoid arthritis medications in preparation for surgery by her rheumatologist. The risks, benefits, alternatives, outcomes, and personnel involved with the aforementioned procedure were discussed in detail with the patient.  After all questions were answered in a satisfactory manner, she agreed to proceed.     OPERATIVE PROCEDURE:  The patient was marked in the preoperative holding area and then brought to the operating room, and placed in supine position on the operating room table.  After satisfactory induction of general endotracheal anesthesia, the patient was then prepped and draped in the usual sterile manner.  Time-out was performed confirming the patient and the procedure to be performed.  The operation commenced via de-epithelializing along the inferior aspect of the breast to create an inferior pedicle.  A Horn pattern was then scored using the previous markings.  Breast tissue was removed from the lateral, medial, superior, and posterior aspects of the breast pedicle.  The same procedure was then performed on the contralateral left side removing the same tissue.  The patient was then tailor-tacked, sat up to check for symmetry.  Once symmetry was assessed to be satisfactory, the patient was then sat back

## 2024-11-12 ENCOUNTER — TELEPHONE (OUTPATIENT)
Dept: SURGERY | Age: 59
End: 2024-11-12

## 2024-11-12 NOTE — TELEPHONE ENCOUNTER
Post Op Call    Patient is 5 days out from bilateral BREAST REDUCTION  surgery     Patient states that he/she is felling: Patient states she has been okay.     Discharge medications include antibiotics and pain medications: yes, Patient takes both.           Any signs of Fevers greater than 101.9?  Chills? Redness? Warmth? Increased Swelling of tissue? no    Does patient have drains in place? yes, patient has 2 drains in place.   Any questions about drains? no    Does patient have vac in place? no   Questions about vac?  No vac       Verify date of post op appt matches date of vac takedown? no                        Reinforce restrictions and use of compression/ dressings :yes, patient verbalized understanding.     Patient has post-op appointment scheduled for: 11/14/2024

## 2024-11-13 ENCOUNTER — OFFICE VISIT (OUTPATIENT)
Dept: SURGERY | Age: 59
End: 2024-11-13

## 2024-11-13 VITALS
BODY MASS INDEX: 33.52 KG/M2 | SYSTOLIC BLOOD PRESSURE: 149 MMHG | TEMPERATURE: 98.2 F | HEART RATE: 78 BPM | WEIGHT: 227 LBS | DIASTOLIC BLOOD PRESSURE: 81 MMHG | OXYGEN SATURATION: 98 %

## 2024-11-13 DIAGNOSIS — Z09 POSTOP CHECK: Primary | ICD-10-CM

## 2024-11-13 PROCEDURE — 99024 POSTOP FOLLOW-UP VISIT: CPT | Performed by: SURGERY

## 2024-11-14 NOTE — PROGRESS NOTES
[facility]    PROCEDURE: BBR  DATE: 11/7/24    Lexie Isaacs has been recovering well since her procedure. Pain has been well controlled without pain medications.     EXAM    BP (!) 149/81   Pulse 78   Temp 98.2 °F (36.8 °C)   Wt 103 kg (227 lb)   SpO2 98%   BMI 33.52 kg/m²      GEN: NAD   BREAST: Incisions healing well  No hematoma/seroma  Nipple on left healing well  Nipple on right with some discoloration, but capillary refill present and sensation noted by patient  Drains serosang    IMP: 59 y.o.female s/p BBR  PLAN: Overall doing well. Drains removed. She is thus far happy with her results and shape and notes that her symptoms of macromastia have resolved. Will follow-up in 1 week for evaluation of the right nipple.     Kiel Burns MD  [facility] Plastic & Reconstructive Surgery  [phone]  11/14/24

## 2024-11-15 RX ORDER — CEPHALEXIN 500 MG/1
500 CAPSULE ORAL 2 TIMES DAILY
Qty: 20 CAPSULE | Refills: 0 | Status: SHIPPED | OUTPATIENT
Start: 2024-11-15

## 2024-11-20 ENCOUNTER — OFFICE VISIT (OUTPATIENT)
Dept: SURGERY | Age: 59
End: 2024-11-20

## 2024-11-20 VITALS
DIASTOLIC BLOOD PRESSURE: 76 MMHG | HEART RATE: 87 BPM | HEIGHT: 69 IN | RESPIRATION RATE: 16 BRPM | SYSTOLIC BLOOD PRESSURE: 137 MMHG | BODY MASS INDEX: 33.03 KG/M2 | WEIGHT: 223 LBS

## 2024-11-20 DIAGNOSIS — Z09 POSTOP CHECK: Primary | ICD-10-CM

## 2024-11-20 PROCEDURE — 99024 POSTOP FOLLOW-UP VISIT: CPT | Performed by: SURGERY

## 2024-11-20 RX ORDER — FLUCONAZOLE 150 MG/1
150 TABLET ORAL DAILY
Qty: 3 TABLET | Refills: 0 | Status: SHIPPED | OUTPATIENT
Start: 2024-11-20 | End: 2024-11-23

## 2024-11-20 NOTE — PROGRESS NOTES
[facility]    PROCEDURE: BBR  DATE: 11/7/24    Lexie Isaacs has been recovering well since her procedure. Pain has been well controlled without pain medications.     EXAM    /76   Pulse 87   Resp 16   Ht 1.753 m (5' 9.02\")   Wt 101.2 kg (223 lb)   BMI 32.92 kg/m²      GEN: NAD   BREAST: Incisions healing well  Let nipple healing well  Right nipple with epidermolysis, but improved appearance    IMP: 59 y.o.female s/p BBR  PLAN: Overall she states that she is feeling better.  Will continue with her local wound care and follow-up in 2 weeks.     Kiel Burns MD  [facility] Plastic & Reconstructive Surgery  [phone]  11/20/24

## 2024-11-21 ENCOUNTER — PATIENT MESSAGE (OUTPATIENT)
Dept: FAMILY MEDICINE CLINIC | Age: 59
End: 2024-11-21

## 2024-11-21 DIAGNOSIS — F43.9 STRESS: ICD-10-CM

## 2024-11-21 RX ORDER — LORAZEPAM 0.5 MG/1
0.5 TABLET ORAL EVERY 8 HOURS PRN
Qty: 30 TABLET | Refills: 0 | Status: SHIPPED | OUTPATIENT
Start: 2024-11-21 | End: 2024-12-21

## 2024-11-21 NOTE — TELEPHONE ENCOUNTER
Have been schedule when she feeling better?    \"Follow-up appointment:   After surgery  Pending bloodwork  Prn\"

## 2024-12-04 ENCOUNTER — OFFICE VISIT (OUTPATIENT)
Dept: SURGERY | Age: 59
End: 2024-12-04

## 2024-12-04 VITALS
DIASTOLIC BLOOD PRESSURE: 83 MMHG | HEIGHT: 69 IN | WEIGHT: 223 LBS | RESPIRATION RATE: 16 BRPM | BODY MASS INDEX: 33.03 KG/M2 | SYSTOLIC BLOOD PRESSURE: 137 MMHG | HEART RATE: 86 BPM

## 2024-12-04 DIAGNOSIS — Z09 POSTOP CHECK: Primary | ICD-10-CM

## 2024-12-04 PROCEDURE — 99024 POSTOP FOLLOW-UP VISIT: CPT | Performed by: SURGERY

## 2024-12-04 NOTE — PROGRESS NOTES
[facility]    PROCEDURE: BBR  DATE: 11/7/24    Lexie Isaacs has been recovering well since her procedure. Pain has been well controlled without pain medications.     EXAM    /83   Pulse 86   Resp 16   Ht 1.753 m (5' 9.02\")   Wt 101.2 kg (223 lb)   BMI 32.92 kg/m²      GEN: NAD   BREAST: Incisions healing well  Let nipple healing well  Bilateral nipples viable    IMP: 59 y.o.female s/p BBR  PLAN: Doing well thus far and she is happy with her results.  Will follow-up in 6 months and can take her immunosuppression.    Kiel Burns MD  [facility] Plastic & Reconstructive Surgery  [phone]  12/04/24

## 2024-12-13 RX ORDER — CEPHALEXIN 500 MG/1
500 CAPSULE ORAL 4 TIMES DAILY
Qty: 40 CAPSULE | Refills: 0 | Status: SHIPPED | OUTPATIENT
Start: 2024-12-13 | End: 2024-12-23

## 2024-12-30 RX ORDER — FLUCONAZOLE 150 MG/1
150 TABLET ORAL DAILY
Qty: 3 TABLET | Refills: 0 | Status: SHIPPED | OUTPATIENT
Start: 2024-12-30 | End: 2025-01-02

## 2025-01-10 ENCOUNTER — OFFICE VISIT (OUTPATIENT)
Dept: ORTHOPEDIC SURGERY | Age: 60
End: 2025-01-10
Payer: COMMERCIAL

## 2025-01-10 DIAGNOSIS — M17.0 BILATERAL PRIMARY OSTEOARTHRITIS OF KNEE: Primary | ICD-10-CM

## 2025-01-10 DIAGNOSIS — M25.561 ACUTE PAIN OF RIGHT KNEE: ICD-10-CM

## 2025-01-10 DIAGNOSIS — M17.11 PRIMARY OSTEOARTHRITIS OF RIGHT KNEE: Primary | ICD-10-CM

## 2025-01-10 PROCEDURE — 99213 OFFICE O/P EST LOW 20 MIN: CPT | Performed by: PHYSICIAN ASSISTANT

## 2025-01-10 SDOH — HEALTH STABILITY: PHYSICAL HEALTH: ON AVERAGE, HOW MANY DAYS PER WEEK DO YOU ENGAGE IN MODERATE TO STRENUOUS EXERCISE (LIKE A BRISK WALK)?: 3 DAYS

## 2025-01-10 SDOH — HEALTH STABILITY: PHYSICAL HEALTH: ON AVERAGE, HOW MANY MINUTES DO YOU ENGAGE IN EXERCISE AT THIS LEVEL?: 30 MIN

## 2025-01-13 ENCOUNTER — OFFICE VISIT (OUTPATIENT)
Dept: FAMILY MEDICINE CLINIC | Age: 60
End: 2025-01-13
Payer: COMMERCIAL

## 2025-01-13 VITALS
SYSTOLIC BLOOD PRESSURE: 110 MMHG | WEIGHT: 231 LBS | RESPIRATION RATE: 16 BRPM | DIASTOLIC BLOOD PRESSURE: 82 MMHG | OXYGEN SATURATION: 95 % | TEMPERATURE: 97 F | HEART RATE: 83 BPM | BODY MASS INDEX: 34.1 KG/M2

## 2025-01-13 DIAGNOSIS — B96.89 ACUTE BACTERIAL SINUSITIS: Primary | ICD-10-CM

## 2025-01-13 DIAGNOSIS — J01.90 ACUTE BACTERIAL SINUSITIS: Primary | ICD-10-CM

## 2025-01-13 DIAGNOSIS — M05.79 RHEUMATOID ARTHRITIS INVOLVING MULTIPLE SITES WITH POSITIVE RHEUMATOID FACTOR (HCC): ICD-10-CM

## 2025-01-13 PROCEDURE — 99214 OFFICE O/P EST MOD 30 MIN: CPT | Performed by: NURSE PRACTITIONER

## 2025-01-13 PROCEDURE — 3079F DIAST BP 80-89 MM HG: CPT | Performed by: NURSE PRACTITIONER

## 2025-01-13 PROCEDURE — 3074F SYST BP LT 130 MM HG: CPT | Performed by: NURSE PRACTITIONER

## 2025-01-13 RX ORDER — FLUCONAZOLE 150 MG/1
150 TABLET ORAL ONCE
Qty: 1 TABLET | Refills: 0 | Status: SHIPPED | OUTPATIENT
Start: 2025-01-13 | End: 2025-01-13

## 2025-01-13 SDOH — ECONOMIC STABILITY: FOOD INSECURITY: WITHIN THE PAST 12 MONTHS, THE FOOD YOU BOUGHT JUST DIDN'T LAST AND YOU DIDN'T HAVE MONEY TO GET MORE.: NEVER TRUE

## 2025-01-13 SDOH — ECONOMIC STABILITY: FOOD INSECURITY: WITHIN THE PAST 12 MONTHS, YOU WORRIED THAT YOUR FOOD WOULD RUN OUT BEFORE YOU GOT MONEY TO BUY MORE.: NEVER TRUE

## 2025-01-13 ASSESSMENT — PATIENT HEALTH QUESTIONNAIRE - PHQ9
1. LITTLE INTEREST OR PLEASURE IN DOING THINGS: NOT AT ALL
SUM OF ALL RESPONSES TO PHQ QUESTIONS 1-9: 0
4. FEELING TIRED OR HAVING LITTLE ENERGY: NOT AT ALL
SUM OF ALL RESPONSES TO PHQ QUESTIONS 1-9: 0
2. FEELING DOWN, DEPRESSED OR HOPELESS: NOT AT ALL
5. POOR APPETITE OR OVEREATING: NOT AT ALL
8. MOVING OR SPEAKING SO SLOWLY THAT OTHER PEOPLE COULD HAVE NOTICED. OR THE OPPOSITE, BEING SO FIGETY OR RESTLESS THAT YOU HAVE BEEN MOVING AROUND A LOT MORE THAN USUAL: NOT AT ALL
9. THOUGHTS THAT YOU WOULD BE BETTER OFF DEAD, OR OF HURTING YOURSELF: NOT AT ALL
3. TROUBLE FALLING OR STAYING ASLEEP: NOT AT ALL
6. FEELING BAD ABOUT YOURSELF - OR THAT YOU ARE A FAILURE OR HAVE LET YOURSELF OR YOUR FAMILY DOWN: NOT AT ALL
SUM OF ALL RESPONSES TO PHQ QUESTIONS 1-9: 0
7. TROUBLE CONCENTRATING ON THINGS, SUCH AS READING THE NEWSPAPER OR WATCHING TELEVISION: NOT AT ALL
SUM OF ALL RESPONSES TO PHQ QUESTIONS 1-9: 0
10. IF YOU CHECKED OFF ANY PROBLEMS, HOW DIFFICULT HAVE THESE PROBLEMS MADE IT FOR YOU TO DO YOUR WORK, TAKE CARE OF THINGS AT HOME, OR GET ALONG WITH OTHER PEOPLE: NOT DIFFICULT AT ALL
SUM OF ALL RESPONSES TO PHQ9 QUESTIONS 1 & 2: 0

## 2025-01-13 ASSESSMENT — ENCOUNTER SYMPTOMS
CONSTIPATION: 0
SORE THROAT: 0
NAUSEA: 0
CHEST TIGHTNESS: 1
EYE REDNESS: 0
EYE ITCHING: 0
DIARRHEA: 0
SHORTNESS OF BREATH: 0
SINUS PRESSURE: 0
BACK PAIN: 0
COUGH: 1
BLOOD IN STOOL: 0
COLOR CHANGE: 0
WHEEZING: 1
RHINORRHEA: 0
ABDOMINAL PAIN: 0
VOMITING: 0

## 2025-01-13 NOTE — ASSESSMENT & PLAN NOTE
Patient follows with rheumatology.  She has been out of her medications for 3 months due to recent surgery she states that she is feeling the effects of this we will continue to follow with specialist

## 2025-01-13 NOTE — PROGRESS NOTES
monthly 1 each 5    tiZANidine (ZANAFLEX) 4 MG tablet Take 1 tablet by mouth every 8 hours as needed (muscle spasm) 30 tablet 5    ondansetron (ZOFRAN) 4 MG tablet Take 1 tablet by mouth daily as needed for Nausea or Vomiting 30 tablet 0    Omega-3 Fatty Acids (FISH OIL) 1000 MG CAPS Take 1 capsule by mouth daily      Multiple Vitamins-Minerals (THERAPEUTIC MULTIVITAMIN-MINERALS) tablet Take 1 tablet by mouth daily      hyoscyamine (ANASPAZ;LEVSIN) 125 MCG tablet Take 1 tablet by mouth every 4 hours as needed for Cramping      Probiotic Product (PROBIOTIC ADVANCED PO) Take 1 tablet by mouth daily      cephALEXin (KEFLEX) 500 MG capsule Take 1 capsule by mouth 2 times daily 20 capsule 0    amphetamine-dextroamphetamine (ADDERALL XR) 20 MG extended release capsule Take 1 capsule by mouth every morning for 30 days. Max Daily Amount: 20 mg 30 capsule 0    neomycin-polymyxin-dexameth 3.5-67577-1.1 OINT       montelukast (SINGULAIR) 10 MG tablet Take 1 tablet by mouth daily 30 tablet 5    miconazole (ZEASORB-AF) 2 % powder Apply topically 2 times daily. 45 g 1    amphetamine-dextroamphetamine (ADDERALL XR) 10 MG extended release capsule Take 1 capsule by mouth daily for 30 days. Max Daily Amount: 10 mg (Patient not taking: Reported on 11/7/2024) 30 capsule 0    ketoconazole (NIZORAL) 2 % cream Apply topically daily. 60 g 5     No current facility-administered medications for this visit.         Review of Systems   Constitutional:  Negative for chills, fatigue and fever.   HENT:  Positive for congestion. Negative for ear pain, postnasal drip, rhinorrhea, sinus pressure, sneezing and sore throat.    Eyes:  Negative for redness and itching.   Respiratory:  Positive for cough, chest tightness and wheezing. Negative for shortness of breath.    Cardiovascular:  Negative for chest pain and palpitations.   Gastrointestinal:  Negative for abdominal pain, blood in stool, constipation, diarrhea, nausea and vomiting.   Endocrine:

## 2025-01-13 NOTE — ASSESSMENT & PLAN NOTE
Will start patient on Augmentin. Continue with symptomatic management, increased water intake, saline irrigation, as well as mucolytics and antihistamines as needed. Patient advised to call back if no improvement.

## 2025-01-15 RX ORDER — BROMPHENIRAMINE MALEATE, PSEUDOEPHEDRINE HYDROCHLORIDE, AND DEXTROMETHORPHAN HYDROBROMIDE 2; 10; 30 MG/5ML; MG/5ML; MG/5ML
10 SYRUP ORAL EVERY 6 HOURS PRN
Qty: 300 ML | Refills: 0 | Status: SHIPPED | OUTPATIENT
Start: 2025-01-15

## 2025-01-16 NOTE — PROGRESS NOTES
Date:  1/10/2025    Name:  Lexie Isaacs  Address:  [address] [place] [id number]    :  1965      Age:   60 y.o.        Chief Complaint    Knee Pain (OPNP Sharp knee pain )      History of Present Illness:  Lexie Isaacs is a 60 y.o. female who presents for follow-up on her bilateral knee pain.  The patient is known to [facility] orthopedics and is being treated conservatively for the arthritis of both of her knees.  This conservative treatment includes: rest, ice, elevation, bracing, physical therapy, home exercises, activity modification, OTC medications as needed, corticosteroid injections and visco supplementation injections.  The patient has received excellent relief from corticosteroid and hyaluronic acid injections.  She gets more relief from hyaluronic acid injections.  Her last hyaluronic acid injection was on 24.  Her baseline osteoarthritic knee pain has returned and she would like to receive another injection.  The patient denies any new injury.  The patient denies any catching, giving way, joint locking, numbness, paresthesias, or weakness.             Past Medical History:   Diagnosis Date    Abnormal Pap smear of cervix     Attention deficit disorder (ADD) without hyperactivity 2024    Autoimmune disorder (HCC)     Breast disorder     DDD (degenerative disc disease), cervical     resolved s/p surgery    Depression with anxiety     Endometriosis     Essential hypertension 2019    GERD (gastroesophageal reflux disease)     Hemangioma of liver 2022    Hypercholesterolemia     Hyperglycemia     Lumbar strain 2023    Menopause ovarian failure     Migraine     PONV (postoperative nausea and vomiting)     with general anesthesia/ NEEDS MEDICATION AND USES THE PATCH    Spastic colon     Wears glasses     DISTANCE        Past Surgical History:   Procedure Laterality Date    APPENDECTOMY      BLEPHAROPLASTY Bilateral 2024    BREAST REDUCTION SURGERY

## 2025-01-20 DIAGNOSIS — M05.79 RHEUMATOID ARTHRITIS INVOLVING MULTIPLE SITES WITH POSITIVE RHEUMATOID FACTOR (HCC): Primary | ICD-10-CM

## 2025-01-20 RX ORDER — SODIUM CHLORIDE 9 MG/ML
5-250 INJECTION, SOLUTION INTRAVENOUS PRN
OUTPATIENT
Start: 2025-01-20

## 2025-01-20 RX ORDER — ACETAMINOPHEN 325 MG/1
650 TABLET ORAL
OUTPATIENT
Start: 2025-01-20

## 2025-01-20 RX ORDER — ONDANSETRON 2 MG/ML
8 INJECTION INTRAMUSCULAR; INTRAVENOUS
OUTPATIENT
Start: 2025-01-20

## 2025-01-20 RX ORDER — EPINEPHRINE 1 MG/ML
0.3 INJECTION, SOLUTION INTRAMUSCULAR; SUBCUTANEOUS PRN
OUTPATIENT
Start: 2025-01-20

## 2025-01-20 RX ORDER — DIPHENHYDRAMINE HYDROCHLORIDE 50 MG/ML
50 INJECTION INTRAMUSCULAR; INTRAVENOUS
OUTPATIENT
Start: 2025-01-20

## 2025-01-20 RX ORDER — SODIUM CHLORIDE 9 MG/ML
INJECTION, SOLUTION INTRAVENOUS CONTINUOUS
OUTPATIENT
Start: 2025-01-20

## 2025-01-20 RX ORDER — HYDROCORTISONE SODIUM SUCCINATE 100 MG/2ML
100 INJECTION INTRAMUSCULAR; INTRAVENOUS
OUTPATIENT
Start: 2025-01-20

## 2025-01-20 RX ORDER — ALBUTEROL SULFATE 90 UG/1
4 INHALANT RESPIRATORY (INHALATION) PRN
OUTPATIENT
Start: 2025-01-20

## 2025-01-21 ENCOUNTER — OFFICE VISIT (OUTPATIENT)
Dept: FAMILY MEDICINE CLINIC | Age: 60
End: 2025-01-21
Payer: COMMERCIAL

## 2025-01-21 VITALS
BODY MASS INDEX: 33.68 KG/M2 | OXYGEN SATURATION: 96 % | SYSTOLIC BLOOD PRESSURE: 158 MMHG | WEIGHT: 228.2 LBS | DIASTOLIC BLOOD PRESSURE: 90 MMHG | TEMPERATURE: 97 F | HEART RATE: 66 BPM | RESPIRATION RATE: 16 BRPM

## 2025-01-21 DIAGNOSIS — J40 BRONCHITIS: Primary | ICD-10-CM

## 2025-01-21 PROCEDURE — 99214 OFFICE O/P EST MOD 30 MIN: CPT | Performed by: NURSE PRACTITIONER

## 2025-01-21 PROCEDURE — 3080F DIAST BP >= 90 MM HG: CPT | Performed by: NURSE PRACTITIONER

## 2025-01-21 PROCEDURE — 3077F SYST BP >= 140 MM HG: CPT | Performed by: NURSE PRACTITIONER

## 2025-01-21 RX ORDER — PREDNISONE 20 MG/1
20 TABLET ORAL DAILY
Qty: 10 TABLET | Refills: 0 | Status: SHIPPED | OUTPATIENT
Start: 2025-01-21 | End: 2025-01-31

## 2025-01-21 RX ORDER — ALBUTEROL SULFATE 90 UG/1
2 INHALANT RESPIRATORY (INHALATION) 4 TIMES DAILY PRN
Qty: 54 G | Refills: 1 | Status: SHIPPED | OUTPATIENT
Start: 2025-01-21

## 2025-01-21 NOTE — PROGRESS NOTES
Lexie Isaacs (:  1965) is a 60 y.o. female,Established patient, here for evaluation of the following chief complaint(s):  Cough (Still with cough, chest congestion, wheezing)      ASSESSMENT/PLAN:  1. Bronchitis  Assessment & Plan:  Continue with symptomatic management. Recommend increased water intake as well as saline irrigation, mucolytics, and antihistamines as needed. Advised to call back if no improvement over the next 4-7 days.      Orders:  -     predniSONE (DELTASONE) 20 MG tablet; Take 1 tablet by mouth daily for 10 days, Disp-10 tablet, R-0Normal  -     albuterol sulfate HFA (VENTOLIN HFA) 108 (90 Base) MCG/ACT inhaler; Inhale 2 puffs into the lungs 4 times daily as needed for Wheezing, Disp-54 g, R-1Normal      No follow-ups on file.    SUBJECTIVE/OBJECTIVE:    Patient in the office toay with ongoing cough and chest tightness after abx and over the counter cough meds. She had left over prednisone and took one which did help. She states there are no fevers. She states that her cough is intermittently productive. Denies any shortness of breath.   Current Outpatient Medications   Medication Sig Dispense Refill    predniSONE (DELTASONE) 20 MG tablet Take 1 tablet by mouth daily for 10 days 10 tablet 0    albuterol sulfate HFA (VENTOLIN HFA) 108 (90 Base) MCG/ACT inhaler Inhale 2 puffs into the lungs 4 times daily as needed for Wheezing 54 g 1    Fnnmbnggz-Dwpysfgk-CV (PKCPTCNI-XXYNEGPYF-MD) 2-30-10 MG/5ML SYRP Take 10 mLs by mouth every 6 hours as needed (cough) 300 mL 0    rosuvastatin (CRESTOR) 10 MG tablet TAKE ONE TABLET BY MOUTH EVERY EVENING AT BEDTIME (Patient taking differently: Take 1 tablet by mouth daily TAKE ONE TABLET BY MOUTH EVERY EVENING AT BEDTIME) 90 tablet 1    triamterene-hydroCHLOROthiazide (MAXZIDE) 75-50 MG per tablet TAKE ONE TABLET BY MOUTH ONCE A DAY 90 tablet 1    traZODone (DESYREL) 50 MG tablet TAKE ONE TO TWO TABLETS BY MOUTH EVERY NIGHT 180 tablet 3    escitalopram

## 2025-01-22 PROBLEM — J40 BRONCHITIS: Status: ACTIVE | Noted: 2025-01-22

## 2025-01-22 ASSESSMENT — ENCOUNTER SYMPTOMS
VOMITING: 0
BACK PAIN: 0
EYE REDNESS: 0
DIARRHEA: 0
BLOOD IN STOOL: 0
WHEEZING: 0
COLOR CHANGE: 0
SINUS PRESSURE: 0
EYE ITCHING: 0
ABDOMINAL PAIN: 0
CHEST TIGHTNESS: 0
CONSTIPATION: 0
NAUSEA: 0
COUGH: 1
SORE THROAT: 0
RHINORRHEA: 0
SHORTNESS OF BREATH: 0

## 2025-01-22 NOTE — ASSESSMENT & PLAN NOTE
Continue with symptomatic management. Recommend increased water intake as well as saline irrigation, mucolytics, and antihistamines as needed. Advised to call back if no improvement over the next 4-7 days.

## 2025-01-24 ENCOUNTER — TELEPHONE (OUTPATIENT)
Dept: ORTHOPEDIC SURGERY | Age: 60
End: 2025-01-24

## 2025-01-24 NOTE — TELEPHONE ENCOUNTER
Patient called Insurance regarding denial and was told doctors office needs to call [facility] pharmacy benefits, [phone] per [facility] we cannot submit synvisc authorizations electronically     Patient would like a call back once decision is discussed with [facility]     [phone]- Lexie

## 2025-01-27 ENCOUNTER — TELEPHONE (OUTPATIENT)
Dept: ORTHOPEDIC SURGERY | Age: 60
End: 2025-01-27

## 2025-01-27 NOTE — TELEPHONE ENCOUNTER
----- Message from Nawaf Wallace PA-C sent at 1/25/2025  3:08 PM EST -----  Please tell the patient that her hyaluronic acid injections were denied by her insurance.  Have her utilize topical Voltaren and we can have her come in for steroid injection.  Should this continue to not improve her knee pain we will resubmit for hyaluronic acid injections.  ----- Message -----  From: Marbella Shileds MA  Sent: 1/24/2025  11:41 AM EST  To: Nawaf Wallace PA-C    Her injection was denied, Bilateral knees. Letter in media if you want to see it.  Change meds? Or do you want to see her?

## 2025-01-28 ENCOUNTER — TELEPHONE (OUTPATIENT)
Dept: ORTHOPEDIC SURGERY | Age: 60
End: 2025-01-28

## 2025-01-28 NOTE — TELEPHONE ENCOUNTER
General Question     Subject: INJECTION APPROVAL  Patient and /or Facility Request: Lexie Isaacs   Contact Number: [phone]     PATIENT CALLED STATES SOMEONE NEED TO CALL [facility]  IN ORDER TO GET INJECTIONS IMPROVED.

## 2025-01-30 ENCOUNTER — TELEPHONE (OUTPATIENT)
Dept: ORTHOPEDIC SURGERY | Age: 60
End: 2025-01-30

## 2025-01-30 ENCOUNTER — OFFICE VISIT (OUTPATIENT)
Age: 60
End: 2025-01-30

## 2025-01-30 VITALS
DIASTOLIC BLOOD PRESSURE: 76 MMHG | HEIGHT: 69 IN | HEART RATE: 88 BPM | SYSTOLIC BLOOD PRESSURE: 128 MMHG | OXYGEN SATURATION: 98 % | BODY MASS INDEX: 33.7 KG/M2

## 2025-01-30 DIAGNOSIS — M54.81 OCCIPITAL NEURALGIA OF RIGHT SIDE: Primary | ICD-10-CM

## 2025-01-30 RX ORDER — LIDOCAINE HYDROCHLORIDE 20 MG/ML
5 INJECTION, SOLUTION INFILTRATION; PERINEURAL ONCE
Status: COMPLETED | OUTPATIENT
Start: 2025-01-30 | End: 2025-01-30

## 2025-01-30 RX ORDER — DEXAMETHASONE SODIUM PHOSPHATE 10 MG/ML
10 INJECTION INTRAMUSCULAR; INTRAVENOUS ONCE
Status: COMPLETED | OUTPATIENT
Start: 2025-01-30 | End: 2025-01-30

## 2025-01-30 RX ADMIN — LIDOCAINE HYDROCHLORIDE 5 ML: 20 INJECTION, SOLUTION INFILTRATION; PERINEURAL at 10:45

## 2025-01-30 RX ADMIN — DEXAMETHASONE SODIUM PHOSPHATE 10 MG: 10 INJECTION INTRAMUSCULAR; INTRAVENOUS at 10:45

## 2025-01-30 NOTE — PATIENT INSTRUCTIONS
YOU MUST CONFIRM YOUR APPOINTMENT 1 DAY PRIOR OR IT WILL BE CANCELLED!!   Our office will call you 3 times the day prior to your appointment in an attempt to confirm.  Please return our call ASAP or confirm your appt through [employer] no later than 3 pm the day before your appointment.  If we do not hear back from you by 3 pm to confirm, your appointment will be cancelled & someone will be added into that slot from our wait list.

## 2025-01-30 NOTE — TELEPHONE ENCOUNTER
Called patient and followed up with her after her reaching out to me yesterday.  I let her know that I did look at her chart and Nawaf has provided all the information that is needed for the insurance company so I am not sure why they deny the injections.  Let her know that she can come back in to see Nawaf if needed and we can try and submit a new note.

## 2025-02-04 NOTE — DISCHARGE INSTRUCTIONS
abatacept  Pronunciation:  neo BAY ta sept  Brand:  Garett  What is the most important information I should know about abatacept?  Follow all directions on your medicine label and package. Tell each of your healthcare providers about all your medical conditions, allergies, and all medicines you use.  What is abatacept?  Abatacept a protein that prevents your body's immune system from attacking healthy tissues such as joints. The immune system helps your body fight infections. In people with autoimmune disorders, the immune system mistakes the body's own cells for invaders and attacks them.  Abatacept is used to treat the symptoms of rheumatoid arthritis, and to prevent joint damage caused by these conditions. This medicine is for adults and children who are at least 2 years old.  Abatacept is also used to treat active psoriatic arthritis in adults.  Abatacept is not a cure for any autoimmune disorder and will only treat the symptoms of your condition.  Abatacept may also be used for purposes not listed in this medication guide.  What should I discuss with my healthcare provider before using abatacept?  You should not use abatacept if you are allergic to it.  Before using abatacept, tell your doctor if you have ever had tuberculosis, if anyone in your household has tuberculosis, or if you have recently traveled to an area where tuberculosis is common.  To make sure abatacept is safe for you, tell your doctor if you have ever had:  a weak immune system;  any type of infection including a skin infection or open sores;  infections that go away and come back;  COPD (chronic obstructive pulmonary disease);  diabetes;  hepatitis; or  if you are scheduled to receive any vaccines.  Using abatacept may increase your risk of developing certain types of cancer such as lymphoma (cancer of the lymph nodes). This risk may be greater in older adults. Talk to your doctor about your specific risk.  It is not known whether  throwing away used needles and syringes. Use a puncture-proof \"sharps\" disposal container (ask your pharmacist where to get one and how to throw it away). Keep this container out of the reach of children and pets.  If you need surgery, tell the surgeon ahead of time that you are using abatacept.  If you have ever had hepatitis B, abatacept can cause this condition to come back or get worse. You will need frequent blood tests to check your liver function during treatment and for several months after you stop using this medicine.  This medicine can cause false results with certain blood glucose tests, showing high blood sugar readings. If you have diabetes, talk to your doctor about the best way to check your blood sugar while you are using abatacept.  Autoimmune disorders are often treated with a combination of different drugs. Use all medications as directed by your doctor. Read the medication guide or patient instructions provided with each medication. Do not change your doses or medication schedule without your doctor's advice.  Store abatacept in the refrigerator. Do not freeze. Keep the medicine in original carton to protect it from light. Do not use abatacept if the expiration date on the medicine label has passed.  If you need to transport the medicine, place the syringes in a cooler with ice packs.  Abatacept that has been mixed with a diluent may be stored in a refrigerator or at room temperature and used within 24 hours.  What happens if I miss a dose?  Call your doctor for instructions if you miss your abatacept dose.  What happens if I overdose?  Seek emergency medical attention or call the Poison Help line at 1-805.418.4715.  What should I avoid while using abatacept?  Do not receive a \"live\" vaccine while using abatacept, and for at least 3 months after your treatment ends. The vaccine may not work as well during this time, and may not fully protect you from disease. Live vaccines include measles,

## 2025-02-06 ENCOUNTER — HOSPITAL ENCOUNTER (OUTPATIENT)
Dept: NURSING | Age: 60
Setting detail: INFUSION SERIES
Discharge: HOME | End: 2025-02-06
Payer: COMMERCIAL

## 2025-02-06 VITALS
WEIGHT: 226 LBS | TEMPERATURE: 98 F | HEIGHT: 69 IN | RESPIRATION RATE: 16 BRPM | HEART RATE: 83 BPM | SYSTOLIC BLOOD PRESSURE: 145 MMHG | BODY MASS INDEX: 33.47 KG/M2 | DIASTOLIC BLOOD PRESSURE: 69 MMHG

## 2025-02-06 DIAGNOSIS — M05.79 RHEUMATOID ARTHRITIS INVOLVING MULTIPLE SITES WITH POSITIVE RHEUMATOID FACTOR (HCC): Primary | ICD-10-CM

## 2025-02-06 PROCEDURE — 2580000003 HC RX 258: Performed by: INTERNAL MEDICINE

## 2025-02-06 PROCEDURE — 96365 THER/PROPH/DIAG IV INF INIT: CPT

## 2025-02-06 PROCEDURE — 6360000002 HC RX W HCPCS: Performed by: INTERNAL MEDICINE

## 2025-02-06 PROCEDURE — 99203 OFFICE O/P NEW LOW 30 MIN: CPT

## 2025-02-06 RX ORDER — ONDANSETRON 2 MG/ML
8 INJECTION INTRAMUSCULAR; INTRAVENOUS
OUTPATIENT
Start: 2025-03-02

## 2025-02-06 RX ORDER — HYDROCORTISONE SODIUM SUCCINATE 100 MG/2ML
100 INJECTION INTRAMUSCULAR; INTRAVENOUS
OUTPATIENT
Start: 2025-03-02

## 2025-02-06 RX ORDER — ALBUTEROL SULFATE 90 UG/1
4 INHALANT RESPIRATORY (INHALATION) PRN
OUTPATIENT
Start: 2025-03-02

## 2025-02-06 RX ORDER — SODIUM CHLORIDE 9 MG/ML
INJECTION, SOLUTION INTRAVENOUS CONTINUOUS
OUTPATIENT
Start: 2025-03-02

## 2025-02-06 RX ORDER — DIPHENHYDRAMINE HYDROCHLORIDE 50 MG/ML
50 INJECTION INTRAMUSCULAR; INTRAVENOUS
OUTPATIENT
Start: 2025-03-02

## 2025-02-06 RX ORDER — SODIUM CHLORIDE 9 MG/ML
5-250 INJECTION, SOLUTION INTRAVENOUS PRN
Status: CANCELLED | OUTPATIENT
Start: 2025-03-02

## 2025-02-06 RX ORDER — EPINEPHRINE 1 MG/ML
0.3 INJECTION, SOLUTION INTRAMUSCULAR; SUBCUTANEOUS PRN
OUTPATIENT
Start: 2025-03-02

## 2025-02-06 RX ORDER — ACETAMINOPHEN 325 MG/1
650 TABLET ORAL
OUTPATIENT
Start: 2025-03-02

## 2025-02-06 RX ADMIN — SODIUM CHLORIDE 1000 MG: 9 INJECTION, SOLUTION INTRAVENOUS at 08:30

## 2025-02-06 ASSESSMENT — PAIN DESCRIPTION - DESCRIPTORS: DESCRIPTORS: ACHING

## 2025-02-06 ASSESSMENT — PAIN DESCRIPTION - FREQUENCY: FREQUENCY: CONTINUOUS

## 2025-02-06 ASSESSMENT — PAIN DESCRIPTION - LOCATION: LOCATION: GENERALIZED

## 2025-02-06 ASSESSMENT — PAIN SCALES - GENERAL: PAINLEVEL_OUTOF10: 4

## 2025-02-06 ASSESSMENT — PAIN DESCRIPTION - PAIN TYPE: TYPE: CHRONIC PAIN

## 2025-02-06 NOTE — PLAN OF CARE
IV INFUSION / INJECTION CARE PLAN    HEMODYNAMIC STATUS  INTERDISCIPLINARY   Goal: Hemodynamic Baseline Staus Maintained / Procedure Completed  Interventions     1. Obtain Patient  Medical /  Surgical History     1 Assess & Review Allergies Prior to IV Infusion or Injection & All  Meds (PRN)     2. Assess Patient  & Obtain  Vital Signs / LOC upon   admission and (PRN)     3.    Start IV as appropriate for Procedure & check Patency (PRN)   NURSING   SAFETY & PSYCHO SOCIAL  INTERDISCIPLINARY   Goal: Patient Returns to Baseline Activity  Interventions     1.  Greet Patient with ID Badge/ Picture in View (PRN)     2. Be Available & Sensitive to Patient’s Needs (PRN)     3.  Communicate referral to Pastoral Care as Appropriate (PRN)     4.  Provide Age Specific Measures (PRN)     4.  Admission Data Base Reviewed     5.  Administer Meds Per Orders (PRN)     5.  Maintain Baseline Activity  Or Activity as Ordered Per Physician     NUTRITION   INTERDISCIPLINARY   Goal: Patient to baseline/ Improved Nutrition  Interventions     1.  Assess Nutritional Status (PRN)       LAB & DIAGNOSTICS   Goal: Additional Tests per Physicians   Orders  Interventions     1.  Lab & Diagnostics per Physician Orders (PRN)     2.  Assess Lab Time Out  for  Patient Safety as Needed (PRN)     RESPIRATORY  INTERDISCIPLINARY   Goal: Airway   Baseline Status Maintained   Interventions     1. Evaluate Bilateral Breath Sounds  Baseline, (PRN),      2. Weight & Height Noted ( PRN)     3.  Assess Baseline SPo2 (PRN)      KNOWLEDGE DEFICIT, EDUCATION, DISCHARGE PLAN   INTERDISCIPLINARY    Patient / SO verbalize Understanding Of Procedural discharge Instructions  Interventions     1.   Obtain Informed Consent (PRN)      2.  Initiate IV Infusion / Injection Plan of Care, Answer Questions (PRN)       3. Assess Patient’s Ability to Understand Information (PRN)     3.   Discharge Planning ; Assure  Presence of   upon Patient Discharge when indicated     4.   Education / Communication  Ongoing As Appropriate      5. Keep Patients / Families Aware of Delays As Appropriate     6. Reinforce Discharge Teaching / Post  Procedure  Instructions (PRN)          PAIN MANAGEMENT  INTERDISCIPLINARY   Goal:Patient Return to Pre Procedure Comfort  Interventions     1.  Assess Baseline  Pain Level  and (PRN)     2.  Intra Procedure ;  Evaluation & Assessment Of  Pain  is Ongoing     3. Post procedure; Assess Pain Level Once Awake/ Prior  To Discharge     2.   Administer Analgesics as Ordered (PRN)      3.  Assess Effectiveness of Pain Management (PRN)       Re-Assess Patient   after all Interventions.   Assess Pain Level 30 - 60 Minutes After Pain Management Intervention.     4. Provide Discharge Teaching      Alert and oriented, no focal deficits, no motor or sensory deficits.

## 2025-02-06 NOTE — PROGRESS NOTES
No changes noted  Pt reports that she feels fine INT  removed without difficulty Cath tip intact Pressure then pressure dressing was applied and secured with a coban dressing IV site unremarkable Pt troy well Verbal discharge instructions reviewed with pt and understanding was verbalized Pt stated she didn't want any discharge instructions as she can get on my chart Pt was then  discharged ambulatory in stable condition and will return in 1 month

## 2025-02-06 NOTE — PROGRESS NOTES
Orencia infused without any signs of adverse reactions Tubing removed from pt  No changes noted Pt agreeable to be monitored for 30 mins as it is the first infusion here at [place]  Will monitor

## 2025-02-06 NOTE — PROGRESS NOTES
Pt here ambulatory for 1st Orencia infusion here at [place]  Pt reports she has been getting these infusions at Dr. Helms's office but had to switch due to insurance   Pt's last infusion was in October  Pt reports that she has never had any reactions from the medication and denies any questions or concerns about it Pt reports general aches and pains rated a 4 out of 10 today Consent for orencia infusion obtained Last TB Gold test was done 12-9-2024  IV # 22 started in [facility] on my  first attempt Pt troy well No pre meds were ordered Orencia 1000 mg IVPB started via filter IV tubing  IV site unremarkable Will monitor for reactions

## 2025-02-07 ENCOUNTER — TELEPHONE (OUTPATIENT)
Dept: ORTHOPEDIC SURGERY | Age: 60
End: 2025-02-07

## 2025-02-07 NOTE — TELEPHONE ENCOUNTER
PT WANTS TO INFORM OFFICE FOR HER UPCOMING APPT SHE IS REQUESTING SYNVISC(1) FOR RT KNEE. PT INS HAS ALREADY APPROVED INJ.    PT IS SCHEDULED 2-18-25 AT 915AM     W/ KRISTIAN AT [place]    PT CAN BE REACHED [phone]       UPCOMING APPT PT IS ALSO REQ ADELE INJ FOR LEFT KNEE

## 2025-02-07 NOTE — TELEPHONE ENCOUNTER
DATE 2/6/2025        DRUG SYNVISC ONE   SERIES OF 1     Approval Auth # [id number] and Dates 2/5/2025-8/4/2025.     RIGHT KNEE     [facility]ty [facility] Name [facility], Phone number [phone], Delivery Date UNSCHEDULED.  A prescription has been called in today, and once the delivery is scheduled the approval message will be routed to the clinic..

## 2025-02-11 ENCOUNTER — TELEPHONE (OUTPATIENT)
Age: 60
End: 2025-02-11

## 2025-02-11 RX ORDER — CARBAMAZEPINE 100 MG/1
100 TABLET, EXTENDED RELEASE ORAL NIGHTLY
Qty: 60 TABLET | Refills: 1 | Status: SHIPPED | OUTPATIENT
Start: 2025-02-11

## 2025-02-11 NOTE — TELEPHONE ENCOUNTER
Pt had nerve block 1/30/25 and says she's still having pain behind her right ear since her nerve block.  Asking if Dr. Orosco has any additional recommendations.     Pharmacy: [facility]

## 2025-02-11 NOTE — TELEPHONE ENCOUNTER
I am sorry if the nerve block has not been working at this time.    Based on the home medication lists, I can add carbamazepine 100 mg at nighttime.    This medication can slowly titrate up to 200 twice daily in a couple weeks as long as there is no significant side effect.

## 2025-02-12 ENCOUNTER — OFFICE VISIT (OUTPATIENT)
Age: 60
End: 2025-02-12

## 2025-02-12 VITALS
HEIGHT: 69 IN | OXYGEN SATURATION: 97 % | RESPIRATION RATE: 16 BRPM | DIASTOLIC BLOOD PRESSURE: 74 MMHG | SYSTOLIC BLOOD PRESSURE: 118 MMHG | HEART RATE: 78 BPM | BODY MASS INDEX: 33.37 KG/M2

## 2025-02-12 DIAGNOSIS — M54.81 OCCIPITAL NEURALGIA OF RIGHT SIDE: Primary | ICD-10-CM

## 2025-02-12 RX ORDER — LIDOCAINE HYDROCHLORIDE 20 MG/ML
5 INJECTION, SOLUTION INFILTRATION; PERINEURAL ONCE
Status: COMPLETED | OUTPATIENT
Start: 2025-02-12 | End: 2025-02-12

## 2025-02-12 RX ORDER — DEXAMETHASONE SODIUM PHOSPHATE 10 MG/ML
10 INJECTION INTRAMUSCULAR; INTRAVENOUS ONCE
Status: COMPLETED | OUTPATIENT
Start: 2025-02-12 | End: 2025-02-12

## 2025-02-12 RX ADMIN — LIDOCAINE HYDROCHLORIDE 5 ML: 20 INJECTION, SOLUTION INFILTRATION; PERINEURAL at 16:22

## 2025-02-12 RX ADMIN — DEXAMETHASONE SODIUM PHOSPHATE 10 MG: 10 INJECTION INTRAMUSCULAR; INTRAVENOUS at 16:21

## 2025-02-12 NOTE — PROGRESS NOTES
Occipital nerve block    Indication:    Right lesser occipital neuralgia    Technique:    Identify landmarks and chuy the injection site as the diagram below  Move hair from area (e.g. with assistant or lubricating jelly can be applied)  Clean the area (e.g. hibiclens, betadine, or Alcohol)    Preparation:    Syringe: 5 cc  Needle: 27 gauge 1.25\"  1% Lidocaine 3 cc  Dexamethasone 10 mg or 1 cc    Injection:    Insert the needle from inferior approach  Angle approximately 30-45 degrees and insert until striking periosteum  Aspirate for blood and if found, withdraw and redirect needle (to prevent intravascular injection)  Inject a total of 2-3 cc of medication at site, distributing in a fan-shaped distribution, LYLY technique

## 2025-02-12 NOTE — PATIENT INSTRUCTIONS
YOU MUST CONFIRM YOUR APPOINTMENT 1 DAY PRIOR OR IT WILL BE CANCELLED!!   Our office will call you 3 times the day prior to your appointment in an attempt to confirm.  Please return our call ASAP or confirm your appt through [employer] no later than 3 pm the day before your appointment.  If we do not hear back from you by 3 pm to confirm, your appointment will be cancelled & someone will be added into that slot from our wait list.

## 2025-02-18 ENCOUNTER — OFFICE VISIT (OUTPATIENT)
Dept: ORTHOPEDIC SURGERY | Age: 60
End: 2025-02-18
Payer: COMMERCIAL

## 2025-02-18 VITALS — BODY MASS INDEX: 33.47 KG/M2 | HEIGHT: 69 IN | WEIGHT: 226 LBS

## 2025-02-18 DIAGNOSIS — M17.0 BILATERAL PRIMARY OSTEOARTHRITIS OF KNEE: Primary | ICD-10-CM

## 2025-02-18 PROCEDURE — 20610 DRAIN/INJ JOINT/BURSA W/O US: CPT | Performed by: PHYSICIAN ASSISTANT

## 2025-02-18 RX ORDER — LIDOCAINE HYDROCHLORIDE 10 MG/ML
3 INJECTION, SOLUTION INFILTRATION; PERINEURAL ONCE
Status: COMPLETED | OUTPATIENT
Start: 2025-02-18 | End: 2025-02-18

## 2025-02-18 RX ORDER — NAPROXEN 500 MG/1
500 TABLET ORAL 2 TIMES DAILY WITH MEALS
Qty: 60 TABLET | Refills: 2 | Status: SHIPPED | OUTPATIENT
Start: 2025-02-18 | End: 2025-05-19

## 2025-02-18 RX ORDER — METHYLPREDNISOLONE ACETATE 40 MG/ML
40 INJECTION, SUSPENSION INTRA-ARTICULAR; INTRALESIONAL; INTRAMUSCULAR; SOFT TISSUE ONCE
Status: COMPLETED | OUTPATIENT
Start: 2025-02-18 | End: 2025-02-18

## 2025-02-18 RX ADMIN — LIDOCAINE HYDROCHLORIDE 3 ML: 10 INJECTION, SOLUTION INFILTRATION; PERINEURAL at 10:13

## 2025-02-18 RX ADMIN — METHYLPREDNISOLONE ACETATE 40 MG: 40 INJECTION, SUSPENSION INTRA-ARTICULAR; INTRALESIONAL; INTRAMUSCULAR; SOFT TISSUE at 10:14

## 2025-02-18 NOTE — PROGRESS NOTES
Chief Complaint    Knee Pain (Power knee inj)      History of Present Illness:  Lexie Isaacs is a 60 y.o. female who presents for corticosteroid injection of the left knee and a hyaluronic acid injection of the right knee.  This patient is known to [facility] orthopedics and is being treated conservatively for the arthritis in their knee.  This conservative treatment includes: rest, ice, elevation, home exercises, activity modification, OTC medications as needed, corticosteroid injections and visco supplementation injections.  The patient received fairly good relief from these injections though cortisone has been giving her little bit less relief.  She continues to take over-the-counter pain medication including Tylenol, ibuprofen and Aleve.  The patient denies any new injury. The patient denies any numbness, paresthesias, or weakness.              Physical exam:  Inspection: Both knees without erythema, ecchymosis, discoloration, abrasion, contusions, deformity or signs of infection.  Palpation: There is tenderness to palpation to the joint line of both knees.  There is patellofemoral crepitus palpable in both knees.  No tenderness to palpation to any other osseous or soft tissue structures of the knee.  Range of motion: Grossly intact  Neurovascular: Patient is neurovascularly intact, equally bilaterally.        Procedures:    VISCO SUPPLEMENTATION  INJECTION: Right KNEE    PROCEDURE: Risks, benefits, and alternatives to the injections were discussed in detail with the patient. The risks discussed included but are not limited to infection, skin reactions, hot swollen joints, and anaphylaxis.     After informed consent was provided, the patient sat on the exam table. The anterior lateral aspect of the right knee was prepped with Chlora-prep. The skin and subcutaneous tissues were anesthetized with ethyl chloride spray and 1% lidocaine injected with a 20-gauge needle.  The needle was left in place and the syringe was

## 2025-02-20 ENCOUNTER — PATIENT MESSAGE (OUTPATIENT)
Dept: FAMILY MEDICINE CLINIC | Age: 60
End: 2025-02-20

## 2025-02-22 DIAGNOSIS — Z00.00 ROUTINE GENERAL MEDICAL EXAMINATION AT A HEALTH CARE FACILITY: Primary | ICD-10-CM

## 2025-03-06 ENCOUNTER — HOSPITAL ENCOUNTER (OUTPATIENT)
Dept: NURSING | Age: 60
Setting detail: INFUSION SERIES
Discharge: HOME | End: 2025-03-06
Payer: COMMERCIAL

## 2025-03-06 ENCOUNTER — PATIENT MESSAGE (OUTPATIENT)
Dept: FAMILY MEDICINE CLINIC | Age: 60
End: 2025-03-06

## 2025-03-06 VITALS
WEIGHT: 225.97 LBS | RESPIRATION RATE: 16 BRPM | DIASTOLIC BLOOD PRESSURE: 71 MMHG | HEIGHT: 69 IN | BODY MASS INDEX: 33.47 KG/M2 | HEART RATE: 102 BPM | SYSTOLIC BLOOD PRESSURE: 147 MMHG | TEMPERATURE: 97.8 F

## 2025-03-06 DIAGNOSIS — M05.79 RHEUMATOID ARTHRITIS INVOLVING MULTIPLE SITES WITH POSITIVE RHEUMATOID FACTOR (HCC): Primary | ICD-10-CM

## 2025-03-06 PROCEDURE — 96365 THER/PROPH/DIAG IV INF INIT: CPT

## 2025-03-06 PROCEDURE — 2580000003 HC RX 258: Performed by: INTERNAL MEDICINE

## 2025-03-06 PROCEDURE — 6360000002 HC RX W HCPCS: Performed by: INTERNAL MEDICINE

## 2025-03-06 PROCEDURE — 99211 OFF/OP EST MAY X REQ PHY/QHP: CPT

## 2025-03-06 RX ORDER — DIPHENHYDRAMINE HYDROCHLORIDE 50 MG/ML
50 INJECTION INTRAMUSCULAR; INTRAVENOUS
OUTPATIENT
Start: 2025-04-03

## 2025-03-06 RX ORDER — SODIUM CHLORIDE 9 MG/ML
INJECTION, SOLUTION INTRAVENOUS CONTINUOUS
OUTPATIENT
Start: 2025-04-03

## 2025-03-06 RX ORDER — EPINEPHRINE 1 MG/ML
0.3 INJECTION, SOLUTION INTRAMUSCULAR; SUBCUTANEOUS PRN
OUTPATIENT
Start: 2025-04-03

## 2025-03-06 RX ORDER — ACETAMINOPHEN 325 MG/1
650 TABLET ORAL
OUTPATIENT
Start: 2025-04-03

## 2025-03-06 RX ORDER — ALBUTEROL SULFATE 90 UG/1
4 INHALANT RESPIRATORY (INHALATION) PRN
OUTPATIENT
Start: 2025-04-03

## 2025-03-06 RX ORDER — OSELTAMIVIR PHOSPHATE 75 MG/1
75 CAPSULE ORAL 2 TIMES DAILY
Qty: 10 CAPSULE | Refills: 0 | Status: SHIPPED | OUTPATIENT
Start: 2025-03-06 | End: 2025-03-11

## 2025-03-06 RX ORDER — ONDANSETRON 2 MG/ML
8 INJECTION INTRAMUSCULAR; INTRAVENOUS
OUTPATIENT
Start: 2025-04-03

## 2025-03-06 RX ORDER — HYDROCORTISONE SODIUM SUCCINATE 100 MG/2ML
100 INJECTION INTRAMUSCULAR; INTRAVENOUS
OUTPATIENT
Start: 2025-04-03

## 2025-03-06 RX ADMIN — SODIUM CHLORIDE 1000 MG: 9 INJECTION, SOLUTION INTRAVENOUS at 08:29

## 2025-03-06 ASSESSMENT — PAIN SCALES - GENERAL: PAINLEVEL_OUTOF10: 0

## 2025-03-06 NOTE — DISCHARGE INSTRUCTIONS
abatacept  Pronunciation:  neo BAY ta sept  Brand:  Garett  What is the most important information I should know about abatacept?  Follow all directions on your medicine label and package. Tell each of your healthcare providers about all your medical conditions, allergies, and all medicines you use.  What is abatacept?  Abatacept a protein that prevents your body's immune system from attacking healthy tissues such as joints. The immune system helps your body fight infections. In people with autoimmune disorders, the immune system mistakes the body's own cells for invaders and attacks them.  Abatacept is used to treat the symptoms of rheumatoid arthritis, and to prevent joint damage caused by these conditions. This medicine is for adults and children who are at least 2 years old.  Abatacept is also used to treat active psoriatic arthritis in adults.  Abatacept is not a cure for any autoimmune disorder and will only treat the symptoms of your condition.  Abatacept may also be used for purposes not listed in this medication guide.  What should I discuss with my healthcare provider before using abatacept?  You should not use abatacept if you are allergic to it.  Before using abatacept, tell your doctor if you have ever had tuberculosis, if anyone in your household has tuberculosis, or if you have recently traveled to an area where tuberculosis is common.  To make sure abatacept is safe for you, tell your doctor if you have ever had:  a weak immune system;  any type of infection including a skin infection or open sores;  infections that go away and come back;  COPD (chronic obstructive pulmonary disease);  diabetes;  hepatitis; or  if you are scheduled to receive any vaccines.  Using abatacept may increase your risk of developing certain types of cancer such as lymphoma (cancer of the lymph nodes). This risk may be greater in older adults. Talk to your doctor about your specific risk.  It is not known whether

## 2025-03-06 NOTE — PROGRESS NOTES
Orencia infused without any signs of adverse reactions Pt reports that she feels fine IV  removed without difficulty Cath tip intact Pressure then pressure dressing was applied and secured with a coban dressing IV site unremarkable Pt troy well Verbal discharge instructions reviewed with pt and understanding was verbalized Pt stated she didn't want any discharge instructions as she can get on my chart Pt was then  discharged ambulatory in stable condition

## 2025-03-06 NOTE — PROGRESS NOTES
Pt here ambulatory for an Orencia infusion   Pt reports that she didn't have any reactions after last months infusion and she is having less discomfort as well  Pt without c/o's today  IV # 22 started in [facility] on my  first attempt Pt troy well No pre meds were ordered Orencia 1000 mg IVPB started via filter IV tubing  IV site unremarkable Will monitor for reactions

## 2025-03-09 ENCOUNTER — OFFICE VISIT (OUTPATIENT)
Dept: URGENT CARE | Age: 60
End: 2025-03-09

## 2025-03-09 VITALS
OXYGEN SATURATION: 100 % | HEART RATE: 82 BPM | SYSTOLIC BLOOD PRESSURE: 118 MMHG | RESPIRATION RATE: 16 BRPM | DIASTOLIC BLOOD PRESSURE: 76 MMHG | TEMPERATURE: 98.8 F

## 2025-03-09 DIAGNOSIS — I10 ESSENTIAL HYPERTENSION: ICD-10-CM

## 2025-03-09 DIAGNOSIS — R05.1 ACUTE COUGH: ICD-10-CM

## 2025-03-09 DIAGNOSIS — R30.0 DYSURIA: ICD-10-CM

## 2025-03-09 DIAGNOSIS — N30.01 ACUTE CYSTITIS WITH HEMATURIA: Primary | ICD-10-CM

## 2025-03-09 DIAGNOSIS — R09.82 POST-NASAL DRIP: ICD-10-CM

## 2025-03-09 LAB
BILIRUBIN, POC: NORMAL
BLOOD URINE, POC: NORMAL
CLARITY, POC: CLEAR
COLOR, POC: YELLOW
GLUCOSE URINE, POC: NORMAL MG/DL
KETONES, POC: NORMAL MG/DL
LEUKOCYTE EST, POC: NORMAL
NITRITE, POC: NORMAL
PH, POC: 7.5
PROTEIN, POC: NORMAL MG/DL
SPECIFIC GRAVITY, POC: 1.01
UROBILINOGEN, POC: 0.2 MG/DL

## 2025-03-09 RX ORDER — DEXTROMETHORPHAN HYDROBROMIDE AND PROMETHAZINE HYDROCHLORIDE 15; 6.25 MG/5ML; MG/5ML
5 SYRUP ORAL 4 TIMES DAILY PRN
Qty: 200 ML | Refills: 0 | Status: SHIPPED | OUTPATIENT
Start: 2025-03-09

## 2025-03-09 RX ORDER — AZELASTINE HYDROCHLORIDE, FLUTICASONE PROPIONATE 137; 50 UG/1; UG/1
1 SPRAY, METERED NASAL 2 TIMES DAILY
Qty: 23 G | Refills: 0 | Status: SHIPPED | OUTPATIENT
Start: 2025-03-09 | End: 2025-03-09

## 2025-03-09 RX ORDER — PHENAZOPYRIDINE HYDROCHLORIDE 200 MG/1
200 TABLET, FILM COATED ORAL 3 TIMES DAILY PRN
Qty: 6 TABLET | Refills: 0 | Status: SHIPPED | OUTPATIENT
Start: 2025-03-09 | End: 2025-03-12

## 2025-03-09 RX ORDER — AZELASTINE HYDROCHLORIDE, FLUTICASONE PROPIONATE 137; 50 UG/1; UG/1
1 SPRAY, METERED NASAL 2 TIMES DAILY
Qty: 23 G | Refills: 0 | Status: SHIPPED | OUTPATIENT
Start: 2025-03-09

## 2025-03-09 RX ORDER — NITROFURANTOIN 25; 75 MG/1; MG/1
100 CAPSULE ORAL 2 TIMES DAILY
Qty: 10 CAPSULE | Refills: 0 | Status: SHIPPED | OUTPATIENT
Start: 2025-03-09 | End: 2025-03-14

## 2025-03-10 LAB — BACTERIA UR CULT: NORMAL

## 2025-03-11 ENCOUNTER — RESULTS FOLLOW-UP (OUTPATIENT)
Dept: URGENT CARE | Age: 60
End: 2025-03-11

## 2025-03-17 DIAGNOSIS — M17.0 BILATERAL PRIMARY OSTEOARTHRITIS OF KNEE: ICD-10-CM

## 2025-03-17 DIAGNOSIS — M17.12 PRIMARY OSTEOARTHRITIS OF LEFT KNEE: Primary | ICD-10-CM

## 2025-03-19 ENCOUNTER — TELEPHONE (OUTPATIENT)
Dept: ORTHOPEDIC SURGERY | Age: 60
End: 2025-03-19

## 2025-03-19 NOTE — TELEPHONE ENCOUNTER
PT IS REQUESTING A CALL BACK IN REGARDS TO INS APPROVAL FOR SYNSVIC FOR LEFT KNEE. PT STATES SHE HAS YET TO RECEIVE RESPONSE VIA [employer].        PT CAN BE REACHED [phone]

## 2025-03-25 ENCOUNTER — HOSPITAL ENCOUNTER (OUTPATIENT)
Age: 60
Setting detail: OUTPATIENT SURGERY
Discharge: HOME | End: 2025-03-25
Attending: INTERNAL MEDICINE | Admitting: INTERNAL MEDICINE
Payer: COMMERCIAL

## 2025-03-25 ENCOUNTER — ANESTHESIA (OUTPATIENT)
Dept: ENDOSCOPY | Age: 60
End: 2025-03-25
Payer: COMMERCIAL

## 2025-03-25 ENCOUNTER — APPOINTMENT (OUTPATIENT)
Dept: ENDOSCOPY | Age: 60
End: 2025-03-25
Attending: INTERNAL MEDICINE
Payer: COMMERCIAL

## 2025-03-25 ENCOUNTER — ANESTHESIA EVENT (OUTPATIENT)
Dept: ENDOSCOPY | Age: 60
End: 2025-03-25
Payer: COMMERCIAL

## 2025-03-25 VITALS
DIASTOLIC BLOOD PRESSURE: 62 MMHG | RESPIRATION RATE: 16 BRPM | HEART RATE: 61 BPM | BODY MASS INDEX: 33.33 KG/M2 | TEMPERATURE: 97 F | HEIGHT: 69 IN | WEIGHT: 225 LBS | OXYGEN SATURATION: 96 % | SYSTOLIC BLOOD PRESSURE: 108 MMHG

## 2025-03-25 DIAGNOSIS — Z12.11 COLON CANCER SCREENING: ICD-10-CM

## 2025-03-25 DIAGNOSIS — Z86.0100 HISTORY OF COLON POLYPS: ICD-10-CM

## 2025-03-25 PROCEDURE — 2580000003 HC RX 258: Performed by: ANESTHESIOLOGY

## 2025-03-25 PROCEDURE — 2500000003 HC RX 250 WO HCPCS

## 2025-03-25 PROCEDURE — 88305 TISSUE EXAM BY PATHOLOGIST: CPT

## 2025-03-25 PROCEDURE — 2709999900 HC NON-CHARGEABLE SUPPLY: Performed by: INTERNAL MEDICINE

## 2025-03-25 PROCEDURE — 3609012400 HC EGD TRANSORAL BIOPSY SINGLE/MULTIPLE: Performed by: INTERNAL MEDICINE

## 2025-03-25 PROCEDURE — 3700000000 HC ANESTHESIA ATTENDED CARE: Performed by: INTERNAL MEDICINE

## 2025-03-25 PROCEDURE — 6360000002 HC RX W HCPCS

## 2025-03-25 PROCEDURE — 3609010600 HC COLONOSCOPY POLYPECTOMY SNARE/COLD BIOPSY: Performed by: INTERNAL MEDICINE

## 2025-03-25 PROCEDURE — 7100000011 HC PHASE II RECOVERY - ADDTL 15 MIN: Performed by: INTERNAL MEDICINE

## 2025-03-25 PROCEDURE — 3700000001 HC ADD 15 MINUTES (ANESTHESIA): Performed by: INTERNAL MEDICINE

## 2025-03-25 PROCEDURE — 7100000010 HC PHASE II RECOVERY - FIRST 15 MIN: Performed by: INTERNAL MEDICINE

## 2025-03-25 PROCEDURE — 88312 SPECIAL STAINS GROUP 1: CPT

## 2025-03-25 RX ORDER — GLYCOPYRROLATE 0.2 MG/ML
INJECTION INTRAMUSCULAR; INTRAVENOUS
Status: DISCONTINUED | OUTPATIENT
Start: 2025-03-25 | End: 2025-03-25 | Stop reason: SDUPTHER

## 2025-03-25 RX ORDER — SODIUM CHLORIDE 0.9 % (FLUSH) 0.9 %
5-40 SYRINGE (ML) INJECTION EVERY 12 HOURS SCHEDULED
Status: DISCONTINUED | OUTPATIENT
Start: 2025-03-25 | End: 2025-03-25 | Stop reason: HOSPADM

## 2025-03-25 RX ORDER — SODIUM CHLORIDE 0.9 % (FLUSH) 0.9 %
5-40 SYRINGE (ML) INJECTION PRN
Status: DISCONTINUED | OUTPATIENT
Start: 2025-03-25 | End: 2025-03-25 | Stop reason: HOSPADM

## 2025-03-25 RX ORDER — SODIUM CHLORIDE, SODIUM LACTATE, POTASSIUM CHLORIDE, CALCIUM CHLORIDE 600; 310; 30; 20 MG/100ML; MG/100ML; MG/100ML; MG/100ML
INJECTION, SOLUTION INTRAVENOUS CONTINUOUS
Status: DISCONTINUED | OUTPATIENT
Start: 2025-03-25 | End: 2025-03-25 | Stop reason: HOSPADM

## 2025-03-25 RX ORDER — LIDOCAINE HYDROCHLORIDE 20 MG/ML
INJECTION, SOLUTION INFILTRATION; PERINEURAL
Status: DISCONTINUED | OUTPATIENT
Start: 2025-03-25 | End: 2025-03-25 | Stop reason: SDUPTHER

## 2025-03-25 RX ORDER — LIDOCAINE HYDROCHLORIDE 10 MG/ML
1 INJECTION, SOLUTION EPIDURAL; INFILTRATION; INTRACAUDAL; PERINEURAL
Status: DISCONTINUED | OUTPATIENT
Start: 2025-03-25 | End: 2025-03-25 | Stop reason: HOSPADM

## 2025-03-25 RX ORDER — FAMOTIDINE 10 MG/ML
INJECTION, SOLUTION INTRAVENOUS
Status: DISCONTINUED | OUTPATIENT
Start: 2025-03-25 | End: 2025-03-25 | Stop reason: SDUPTHER

## 2025-03-25 RX ORDER — PROPOFOL 10 MG/ML
INJECTION, EMULSION INTRAVENOUS
Status: DISCONTINUED | OUTPATIENT
Start: 2025-03-25 | End: 2025-03-25 | Stop reason: SDUPTHER

## 2025-03-25 RX ADMIN — PROPOFOL 150 MCG/KG/MIN: 10 INJECTION, EMULSION INTRAVENOUS at 10:36

## 2025-03-25 RX ADMIN — PROPOFOL 80 MG: 10 INJECTION, EMULSION INTRAVENOUS at 10:37

## 2025-03-25 RX ADMIN — SODIUM CHLORIDE, SODIUM LACTATE, POTASSIUM CHLORIDE, AND CALCIUM CHLORIDE: .6; .31; .03; .02 INJECTION, SOLUTION INTRAVENOUS at 09:14

## 2025-03-25 RX ADMIN — GLYCOPYRROLATE 0.2 MG: 0.2 INJECTION INTRAMUSCULAR; INTRAVENOUS at 10:32

## 2025-03-25 RX ADMIN — FAMOTIDINE 20 MG: 10 INJECTION, SOLUTION INTRAVENOUS at 10:41

## 2025-03-25 RX ADMIN — LIDOCAINE HYDROCHLORIDE 100 MG: 20 INJECTION, SOLUTION INFILTRATION; PERINEURAL at 10:36

## 2025-03-25 ASSESSMENT — PAIN SCALES - GENERAL: PAINLEVEL_OUTOF10: 0

## 2025-03-25 ASSESSMENT — PAIN - FUNCTIONAL ASSESSMENT
PAIN_FUNCTIONAL_ASSESSMENT: 0-10
PAIN_FUNCTIONAL_ASSESSMENT: WONG-BAKER FACES

## 2025-03-25 NOTE — H&P
Gastroenterology Outpatient History and Physical     Patient: Lexie Isaacs MRN: [id number] Sex: female   YOB: 1965 Age: 60 y.o. Location: [facility]    Date:3/25/2025  Primary Care Physician: Reilly Canales MD         Patient: Lexie Isaacs    Physician: IBRAHIMA SMITH MD    History of Present Illness: EGD for GERD.  Colonoscopy for history of colon polyps.  Lexie is a history of rheumatoid arthritis, benign liver hemangioma, IBS.  Her last EGD and colonoscopy was 7/22 with 7 small low risk polyps and a 3-year recall.  She had asked for the EGD to be done at her colonoscopy because she had some worsening of breakthrough nocturnal reflux which she admits might be partially dietary related.  Of note, at her last EGD, there was a 7 to 8 mm nodule at 21 cm from the incisors that was submucosal, unchanged from previous.    Review of Systems:  Weight Loss: No  Dysphagia: No  Dyspepsia: No  History:  Past Medical History:   Diagnosis Date    Abnormal Pap smear of cervix     Attention deficit disorder (ADD) without hyperactivity 06/21/2024    Autoimmune disorder     Breast disorder     DDD (degenerative disc disease), cervical     resolved s/p surgery    Depression with anxiety     Endometriosis     Essential hypertension 06/20/2019    GERD (gastroesophageal reflux disease)     Hemangioma of liver 04/12/2022    Hypercholesterolemia     Hyperglycemia     Lumbar strain 09/07/2023    Menopause ovarian failure     Migraine     PONV (postoperative nausea and vomiting)     with general anesthesia/ NEEDS MEDICATION AND USES THE PATCH    Spastic colon     Wears glasses     DISTANCE      Past Surgical History:   Procedure Laterality Date    APPENDECTOMY      BLEPHAROPLASTY Bilateral 09/2024    BREAST REDUCTION SURGERY Bilateral 11/7/2024    Bilateral BREAST REDUCTION performed by Maverick Burns MD at [facility] OR    CERVICAL LAMINECTOMY  2010    CHOLECYSTECTOMY      COLONOSCOPY

## 2025-03-25 NOTE — PROGRESS NOTES
Ambulatory Surgery/Procedure Discharge Note    Vitals:    03/25/25 1130   BP: 108/62   Pulse: 61   Resp: 16   Temp: 97 °F (36.1 °C)   SpO2: 96%       No intake/output data recorded.    Restroom use offered before discharge.  Yes  Pt discharge instructions were read at bedside including the post op plan. Pt tolerates PO well.  Dr. Medina left a voicemail from her phone.     Report handed to Alexandre.    Pain assessment:  none  Pain Level: 0              3/25/2025 11:32 AM

## 2025-03-25 NOTE — DISCHARGE INSTRUCTIONS
ENDOSCOPY DISCHARGE INSTRUCTIONS:    Call the physician that did your procedure for any questions or concern:    [facility]: [phone]  DR. IBRAHIMA SMITH      ACTIVITY:    There are potential side effects to the medications used for sedation and anesthesia during your procedure.  These include:  Dizziness or light-headedness, confusion or memory loss, delayed reaction times, loss of coordination, nausea and vomiting.  Because of your increased risk for injury, we ask that you observe the following precautions:  For the next 24 hours,  DO NOT operate an automobile, bicycle, motorcycle, , power tools or large equipment of any kind.  Do not drink alcohol, sign any legal documents or make any legal decisions for 24 hours.  Do not bend your head over lower than your heart.  DO sit on the side of bed/couch awhile before getting up.  Plan on bedrest or quiet relaxation today.  You may resume normal activities in 24 hours.    DIET:    Your first meal today should be light, avoiding spicy and fatty foods.  If you tolerate this first meal, then you may advance to your regular diet unless otherwise advised by your physician.    NORMAL SYMPTOMS:  -Mild sore throat if you’ve had an EGD   -Gaseous discomfort    NOTIFY YOUR PHYSICIAN IF THESE SYMPTOMS OCCUR:  1. Fever (greater than 100)  5. Increased abdominal bloating  2. Severe pain    6. Excessive bleeding  3. Nausea and vomiting  7. Chest pain                                                                    4. Chills    8. Shortness of breath    ADDITIONAL INSTRUCTIONS:    Biopsy results: Call [facility] for biopsy results in 1 week    Educational Information:    PLAN:   -We will follow-up with biopsy results and update the patient via the [employer] patient portal within 1-2 weeks.  If esophageal submucosal biopsy results are unrevealing, would proceed with EGD EUS electively.    -Anticipate 3-year recall for next colonoscopy assuming favorable biopsy

## 2025-03-25 NOTE — PROGRESS NOTES
Ambulatory Surgery/Procedure Discharge Note    Vitals:    03/25/25 1130   BP: 108/62   Pulse: 61   Resp: 16   Temp: 97 °F (36.1 °C)   SpO2: 96%       No intake/output data recorded.pt drank soda and voided in toilet    Restroom use offered before discharge.  Yes    Pain assessment:  none  Pain Level: 0    This RN took over care of pt from BAO Viera.    Pt alert; speech clear; breathing easily on RA; denies any pain; drank soda and tolerated well.  Dr. Medina left message on pt's phone regarding results of procedure per pt request.    Discharge instructions discussed with pt by BAO Viera per his report, and pt verbalized understanding.  IV removed, and dressing applied.      Patient discharged to home/self care. Patient discharged via wheel chair by RN to waiting family/S.O.       3/25/2025 12:11 PM

## 2025-03-25 NOTE — PROCEDURES
EGD/COLONOSCOPY     Patient: Lexie Isaacs MRN: [id number]   YOB: 1965 Age: 60 y.o. Sex: female   Unit: [facility] ENDOSCOPY Room/Bed: Endo Pool/NONE Location: [facility]    Admitting Physician: IBRAHIMA SMITH     Primary Care Physician: Reilly Canales MD      DATE OF PROCEDURE: 3/25/2025  PROCEDURE: EGD/Colonoscopy    PREOPERATIVE DIAGNOSIS: Colon cancer screening [Z12.11]  History of colon polyps [Z86.0100]  HPI: EGD for GERD.  Colonoscopy for history of colon polyps.  Lexie is a history of rheumatoid arthritis, benign liver hemangioma, IBS.  Her last EGD and colonoscopy was 7/22 with 7 small low risk polyps and a 3-year recall.  She had asked for the EGD to be done at her colonoscopy because she had some worsening of breakthrough nocturnal reflux which she admits might be partially dietary related.  Of note, at her last EGD, there was a 7 to 8 mm nodule at 21 cm from the incisors that was submucosal, unchanged from previous.    ENDOSCOPIST: IBRAHIMA SMITH MD    POSTOPERATIVE DIAGNOSIS:    -Esophageal 4 x 7 mm firm submucosal nodule at about 20 cm from the incisors.  Bite on bite biopsies were obtained.  Small potential monilial deposits noted throughout the esophagus, and biopsies obtained to rule out candidal esophagitis.  -Stomach with mild erosive gastritis, biopsied to rule out H. pylori though ibuprofen or other NSAID likely contributes.  -Duodenum unremarkable to the second portion.  -Terminal ileum is normal in the distal 3 to 4 cm.  -4 small colon polyps removed with snare.  2 of the polyps may not up and retrieved.  -Mild left-sided diverticulosis.    PLAN:   -We will follow-up with biopsy results and update the patient via the [email or web address] patient portal within 1-2 weeks.  If esophageal submucosal biopsy results are unrevealing, would proceed with EGD EUS electively.    -Anticipate 3-year recall for next colonoscopy assuming favorable biopsy results.  -Recommend strict

## 2025-03-25 NOTE — ANESTHESIA POSTPROCEDURE EVALUATION
Department of Anesthesiology  Postprocedure Note    Patient: Lexie Isaacs  MRN: [id number]  YOB: 1965  Date of evaluation: 3/25/2025    Procedure Summary       Date: 03/25/25 Room / Location: [place] [address] / [facility]    Anesthesia Start: 1028 Anesthesia Stop: 1113    Procedures:       COLONOSCOPY POLYPECTOMY SNARE/BIOPSY      ESOPHAGOGASTRODUODENOSCOPY BIOPSY Diagnosis:       Colon cancer screening      History of colon polyps      (Colon cancer screening [Z12.11])      (History of colon polyps [Z86.0100])    Surgeons: Pawel Medina MD Responsible Provider: Gera Collins MD    Anesthesia Type: MAC ASA Status: 2            Anesthesia Type: No value filed.    Wilda Phase I: Wilda Score: 10    Wilda Phase II: Wilda Score: 10    Anesthesia Post Evaluation    Patient location during evaluation: PACU  Patient participation: complete - patient participated  Level of consciousness: awake  Pain score: 2  Airway patency: patent  Cardiovascular status: blood pressure returned to baseline  Respiratory status: acceptable  Hydration status: euvolemic  Pain management: adequate    No notable events documented.

## 2025-03-25 NOTE — ANESTHESIA PRE PROCEDURE
Department of Anesthesiology  Preprocedure Note       Name:  Lexie Isaacs   Age:  60 y.o.  :  1965                                          MRN:  [id number]         Date:  3/25/2025      Surgeon: Surgeon(s):  Pawel Medina MD    Procedure: Procedure(s):  COLONOSCOPY  ESOPHAGOGASTRODUODENOSCOPY    Medications prior to admission:   Prior to Admission medications    Medication Sig Start Date End Date Taking? Authorizing Provider   promethazine-dextromethorphan (PROMETHAZINE-DM) 6.25-15 MG/5ML syrup Take 5 mLs by mouth 4 times daily as needed for Cough 3/9/25   Lisbeth Fong APRN - CNP   Azelastine-Fluticasone 137-50 MCG/ACT SUSP 1 spray by Nasal route 2 times daily 3/9/25   Lisbeth Fong APRN - CNP   tiZANidine (ZANAFLEX) 4 MG tablet TAKE ONE TABLET BY MOUTH EVERY 8 HOURS AS NEEDED FOR MUSCLE SPASMS 25   Reilly Canales MD   naproxen (NAPROSYN) 500 MG tablet Take 1 tablet by mouth 2 times daily (with meals) 25  Nawaf Wallace PA-C   carBAMazepine (TEGRETOL-XR) 100 MG extended release tablet Take 1 tablet by mouth at bedtime 25   Oniel Tolentino MD   rosuvastatin (CRESTOR) 10 MG tablet TAKE ONE TABLET BY MOUTH EVERY EVENING AT BEDTIME 24   Reilly Canales MD   triamterene-hydroCHLOROthiazide (MAXZIDE) 75-50 MG per tablet TAKE ONE TABLET BY MOUTH ONCE A DAY 24   Reilly Canales MD   traZODone (DESYREL) 50 MG tablet TAKE ONE TO TWO TABLETS BY MOUTH EVERY NIGHT 24   Reilly Canales MD   escitalopram (LEXAPRO) 10 MG tablet Take 1.5 tablets by mouth daily 24   Reilly Canales MD   abatacept (ORENCIA) 250 MG injection 250mg IV monthly 24   Reilly Canales MD   ondansetron (ZOFRAN) 4 MG tablet Take 1 tablet by mouth daily as needed for Nausea or Vomiting 3/21/23   OcalaReilly MD   Omega-3 Fatty Acids (FISH OIL) 1000 MG CAPS Take 1 capsule by mouth daily    Provider, MD Nina   Multiple Vitamins-Minerals (THERAPEUTIC

## 2025-03-30 RX ORDER — ROSUVASTATIN CALCIUM 10 MG/1
TABLET, COATED ORAL
Qty: 90 TABLET | Refills: 1 | Status: SHIPPED | OUTPATIENT
Start: 2025-03-30

## 2025-03-30 RX ORDER — TRIAMTERENE AND HYDROCHLOROTHIAZIDE 75; 50 MG/1; MG/1
1 TABLET ORAL DAILY
Qty: 90 TABLET | Refills: 1 | Status: SHIPPED | OUTPATIENT
Start: 2025-03-30

## 2025-03-31 DIAGNOSIS — Z00.00 ROUTINE GENERAL MEDICAL EXAMINATION AT A HEALTH CARE FACILITY: ICD-10-CM

## 2025-03-31 LAB
ALBUMIN SERPL-MCNC: 4.3 G/DL (ref 3.4–5)
ALBUMIN/GLOB SERPL: 2.3 {RATIO} (ref 1.1–2.2)
ALP SERPL-CCNC: 79 U/L (ref 40–129)
ALT SERPL-CCNC: 29 U/L (ref 10–40)
ANION GAP SERPL CALCULATED.3IONS-SCNC: 11 MMOL/L (ref 3–16)
AST SERPL-CCNC: 21 U/L (ref 15–37)
BASOPHILS # BLD: 0.1 K/UL (ref 0–0.2)
BASOPHILS NFR BLD: 0.9 %
BILIRUB SERPL-MCNC: 0.4 MG/DL (ref 0–1)
BUN SERPL-MCNC: 13 MG/DL (ref 7–20)
CALCIUM SERPL-MCNC: 9.3 MG/DL (ref 8.3–10.6)
CHLORIDE SERPL-SCNC: 101 MMOL/L (ref 99–110)
CHOLEST SERPL-MCNC: 173 MG/DL (ref 0–199)
CO2 SERPL-SCNC: 27 MMOL/L (ref 21–32)
CREAT SERPL-MCNC: 0.9 MG/DL (ref 0.6–1.1)
DEPRECATED RDW RBC AUTO: 14.2 % (ref 12.4–15.4)
EOSINOPHIL # BLD: 0.1 K/UL (ref 0–0.6)
EOSINOPHIL NFR BLD: 1.8 %
GFR SERPLBLD CREATININE-BSD FMLA CKD-EPI: 74 ML/MIN/{1.73_M2}
GLUCOSE SERPL-MCNC: 117 MG/DL (ref 70–99)
HCT VFR BLD AUTO: 42.2 % (ref 36–48)
HDLC SERPL-MCNC: 47 MG/DL (ref 40–60)
HGB BLD-MCNC: 14.2 G/DL (ref 12–16)
LDLC SERPL CALC-MCNC: 102 MG/DL
LYMPHOCYTES # BLD: 1.7 K/UL (ref 1–5.1)
LYMPHOCYTES NFR BLD: 26.4 %
MCH RBC QN AUTO: 31.3 PG (ref 26–34)
MCHC RBC AUTO-ENTMCNC: 33.6 G/DL (ref 31–36)
MCV RBC AUTO: 93.2 FL (ref 80–100)
MONOCYTES # BLD: 0.4 K/UL (ref 0–1.3)
MONOCYTES NFR BLD: 6.6 %
NEUTROPHILS # BLD: 4 K/UL (ref 1.7–7.7)
NEUTROPHILS NFR BLD: 64.3 %
PLATELET # BLD AUTO: 201 K/UL (ref 135–450)
PMV BLD AUTO: 9 FL (ref 5–10.5)
POTASSIUM SERPL-SCNC: 3.8 MMOL/L (ref 3.5–5.1)
PROT SERPL-MCNC: 6.2 G/DL (ref 6.4–8.2)
RBC # BLD AUTO: 4.52 M/UL (ref 4–5.2)
SODIUM SERPL-SCNC: 139 MMOL/L (ref 136–145)
TRIGL SERPL-MCNC: 119 MG/DL (ref 0–150)
TSH SERPL DL<=0.005 MIU/L-ACNC: 1.23 UIU/ML (ref 0.27–4.2)
VLDLC SERPL CALC-MCNC: 24 MG/DL
WBC # BLD AUTO: 6.3 K/UL (ref 4–11)

## 2025-04-01 ENCOUNTER — RESULTS FOLLOW-UP (OUTPATIENT)
Dept: FAMILY MEDICINE CLINIC | Age: 60
End: 2025-04-01

## 2025-04-01 ENCOUNTER — OFFICE VISIT (OUTPATIENT)
Dept: ORTHOPEDIC SURGERY | Age: 60
End: 2025-04-01

## 2025-04-01 ENCOUNTER — TELEPHONE (OUTPATIENT)
Dept: SURGERY | Age: 60
End: 2025-04-01

## 2025-04-01 DIAGNOSIS — M17.12 PRIMARY OSTEOARTHRITIS OF LEFT KNEE: Primary | ICD-10-CM

## 2025-04-01 LAB
EST. AVERAGE GLUCOSE BLD GHB EST-MCNC: 119.8 MG/DL
HBA1C MFR BLD: 5.8 %

## 2025-04-01 RX ORDER — LIDOCAINE HYDROCHLORIDE 10 MG/ML
3 INJECTION, SOLUTION INFILTRATION; PERINEURAL ONCE
Status: COMPLETED | OUTPATIENT
Start: 2025-04-01 | End: 2025-04-01

## 2025-04-01 RX ADMIN — LIDOCAINE HYDROCHLORIDE 3 ML: 10 INJECTION, SOLUTION INFILTRATION; PERINEURAL at 15:25

## 2025-04-01 NOTE — DISCHARGE INSTRUCTIONS
mumps, rubella (MMR), polio, rotavirus, typhoid, yellow fever, varicella (chickenpox), zoster (shingles), and nasal flu (influenza) vaccine.  Avoid being near people who are sick or have infections. Tell your doctor at once if you develop signs of infection.  What are the possible side effects of abatacept?  Some side effects may occur during the injection. Tell your caregiver right away if you feel dizzy, light-headed, itchy, or have a severe headache or trouble breathing within 1 hour after receiving the injection.  Get emergency medical help if you have signs of an allergic reaction:  hives; difficulty breathing; swelling of your face, lips, tongue, or throat.  Serious and sometimes fatal infections may occur during treatment with abatacept. Stop using this medicine and call your doctor right away if you have signs of infection such as:  fever, chills, night sweats, flu symptoms, weight loss;  feeling very tired;  dry cough, sore throat; or  warmth, pain, or redness of your skin.  Call your doctor at once if you have any of these other serious side effects:  trouble breathing;  stabbing chest pain, wheezing, cough with yellow or green mucus;  pain or burning when you urinate; or  signs of skin infection such as itching, swelling, warmth, redness, or oozing.  Common side effects may include:  fever;  nausea, diarrhea, stomach pain;  headache; or  cold symptoms such as stuffy nose, sneezing, sore throat, cough.  This is not a complete list of side effects and others may occur. Call your doctor for medical advice about side effects. You may report side effects to FDA at 2-167-FDA-7566.  What other drugs will affect abatacept?  Tell your doctor about all your current medicines and any you start or stop using, especially:  anakinra (Kineret);  adalimumab (Humira);  certolizumab (Cimzia);  etanercept (Enbrel);  golimumab (Simponi);  infliximab (Remicade);  rituximab (Rituxan); or  tocilizumab (Actemra).  This list is  not complete. Other drugs may interact with abatacept, including prescription and over-the-counter medicines, vitamins, and herbal products. Not all possible interactions are listed in this medication guide.  Where can I get more information?  Your doctor or pharmacist can provide more information about abatacept.    RETURN ON MAY 1,2025 AT 0800 FOR ORENCIA INFUSION  FOLLOW UP WITH DR. CAGLE AS NEEDED OR SCHEDULED  CALL US IF YOU NEED TO CANCEL OR RESCHEDULE  YOUR APPOINTMENT   [phone]

## 2025-04-01 NOTE — PROGRESS NOTES
Chief Complaint    Follow-up (Synvisc one injection)      History of Present Illness:  Lexie Isaacs is a 60 y.o. female who presents for a HA injection in the left knee.  This patient is known to [facility] orthopedics and is being treated conservatively for the arthritis in her knees.  This conservative treatment includes: rest, ice, elevation, home exercises, activity modification, OTC medications as needed, corticosteroid injections and visco supplementation injections.  She continues to take over-the-counter pain medication including Tylenol, ibuprofen and Aleve.  The patient denies any new injury. The patient denies any numbness, paresthesias, or weakness.              Physical exam:  Inspection: Both knees without erythema, ecchymosis, discoloration, abrasion, contusions, deformity or signs of infection.  Palpation: There is tenderness to palpation to the joint line of the left knee.  There is patellofemoral crepitus palpable in both knees.  No tenderness to palpation to any other osseous or soft tissue structures of the knee.  Range of motion: Grossly intact  Neurovascular: Patient is neurovascularly intact, equally bilaterally.        Procedures:    VISCO SUPPLEMENTATION  INJECTION: Left KNEE    PROCEDURE: Risks, benefits, and alternatives to the injections were discussed in detail with the patient. The risks discussed included but are not limited to infection, skin reactions, hot swollen joints, and anaphylaxis.     After informed consent was provided, the patient sat on the exam table. The anterior lateral aspect of the left knee was prepped with Chlora-prep. The skin and subcutaneous tissues were anesthetized with ethyl chloride spray and 1% lidocaine injected with a 20-gauge needle.  The needle was left in place and the syringe was detached from the needle.  The Synvisc One syringe was attached to the 20-gauge needle and 48 mg of the Synvisc One was injected into the knee without resistance.The needle was

## 2025-04-01 NOTE — TELEPHONE ENCOUNTER
Patient stop by asking about up coming appointment in June 06/30/25 with Mammogram.  She would like to know if this appointment should be moved out due to some tenderness(not concerned with tenderness) with recent breast reduction on 11/07/24 with Dr. Burns.    Patient states you may send her a my-chart message in response  Thank you

## 2025-04-03 ENCOUNTER — HOSPITAL ENCOUNTER (OUTPATIENT)
Dept: NURSING | Age: 60
Setting detail: INFUSION SERIES
Discharge: HOME | End: 2025-04-03
Payer: COMMERCIAL

## 2025-04-03 VITALS
WEIGHT: 225 LBS | DIASTOLIC BLOOD PRESSURE: 72 MMHG | HEART RATE: 94 BPM | HEIGHT: 69 IN | BODY MASS INDEX: 33.33 KG/M2 | RESPIRATION RATE: 16 BRPM | TEMPERATURE: 98.2 F | SYSTOLIC BLOOD PRESSURE: 149 MMHG

## 2025-04-03 DIAGNOSIS — M05.79 RHEUMATOID ARTHRITIS INVOLVING MULTIPLE SITES WITH POSITIVE RHEUMATOID FACTOR (HCC): Primary | ICD-10-CM

## 2025-04-03 PROCEDURE — 2580000003 HC RX 258: Performed by: INTERNAL MEDICINE

## 2025-04-03 PROCEDURE — 99211 OFF/OP EST MAY X REQ PHY/QHP: CPT

## 2025-04-03 PROCEDURE — 6360000002 HC RX W HCPCS: Performed by: INTERNAL MEDICINE

## 2025-04-03 PROCEDURE — 96365 THER/PROPH/DIAG IV INF INIT: CPT

## 2025-04-03 RX ORDER — ALBUTEROL SULFATE 90 UG/1
4 INHALANT RESPIRATORY (INHALATION) PRN
OUTPATIENT
Start: 2025-05-01

## 2025-04-03 RX ORDER — DIPHENHYDRAMINE HYDROCHLORIDE 50 MG/ML
50 INJECTION, SOLUTION INTRAMUSCULAR; INTRAVENOUS
OUTPATIENT
Start: 2025-05-01

## 2025-04-03 RX ORDER — ONDANSETRON 2 MG/ML
8 INJECTION INTRAMUSCULAR; INTRAVENOUS
OUTPATIENT
Start: 2025-05-01

## 2025-04-03 RX ORDER — HYDROCORTISONE SODIUM SUCCINATE 100 MG/2ML
100 INJECTION INTRAMUSCULAR; INTRAVENOUS
OUTPATIENT
Start: 2025-05-01

## 2025-04-03 RX ORDER — SODIUM CHLORIDE 9 MG/ML
INJECTION, SOLUTION INTRAVENOUS CONTINUOUS
OUTPATIENT
Start: 2025-05-01

## 2025-04-03 RX ORDER — ACETAMINOPHEN 325 MG/1
650 TABLET ORAL
OUTPATIENT
Start: 2025-05-01

## 2025-04-03 RX ORDER — EPINEPHRINE 1 MG/ML
0.3 INJECTION, SOLUTION INTRAMUSCULAR; SUBCUTANEOUS PRN
OUTPATIENT
Start: 2025-05-01

## 2025-04-03 RX ADMIN — SODIUM CHLORIDE 1000 MG: 9 INJECTION, SOLUTION INTRAVENOUS at 08:53

## 2025-04-03 ASSESSMENT — PAIN SCALES - GENERAL: PAINLEVEL_OUTOF10: 0

## 2025-04-03 NOTE — PROGRESS NOTES
Pt here ambulatory for an Orencia infusion   Pt without c/o's today  Pt reports she has her aches and pains but overall feels a lot better with her arthritis symptoms  IV # 22 started in [facility] on my  first attempt Pt troy well No pre meds were ordered Orencia 1000 mg IVPB started via filter IV tubing  IV site unremarkable Will monitor for reactions

## 2025-04-07 ENCOUNTER — OFFICE VISIT (OUTPATIENT)
Dept: FAMILY MEDICINE CLINIC | Age: 60
End: 2025-04-07
Payer: COMMERCIAL

## 2025-04-07 VITALS
SYSTOLIC BLOOD PRESSURE: 132 MMHG | WEIGHT: 230 LBS | OXYGEN SATURATION: 98 % | HEIGHT: 69 IN | TEMPERATURE: 97.3 F | BODY MASS INDEX: 34.07 KG/M2 | DIASTOLIC BLOOD PRESSURE: 66 MMHG | HEART RATE: 78 BPM | RESPIRATION RATE: 16 BRPM

## 2025-04-07 DIAGNOSIS — K22.9 NODULE OF ESOPHAGUS: ICD-10-CM

## 2025-04-07 DIAGNOSIS — K21.9 GASTROESOPHAGEAL REFLUX DISEASE WITHOUT ESOPHAGITIS: ICD-10-CM

## 2025-04-07 DIAGNOSIS — F43.9 STRESS: ICD-10-CM

## 2025-04-07 DIAGNOSIS — R73.9 HYPERGLYCEMIA: ICD-10-CM

## 2025-04-07 DIAGNOSIS — M05.79 RHEUMATOID ARTHRITIS INVOLVING MULTIPLE SITES WITH POSITIVE RHEUMATOID FACTOR (HCC): ICD-10-CM

## 2025-04-07 DIAGNOSIS — R63.5 WEIGHT GAIN: ICD-10-CM

## 2025-04-07 DIAGNOSIS — I10 ESSENTIAL HYPERTENSION: Primary | ICD-10-CM

## 2025-04-07 DIAGNOSIS — E78.00 HYPERCHOLESTEROLEMIA: ICD-10-CM

## 2025-04-07 PROBLEM — J01.90 ACUTE BACTERIAL SINUSITIS: Status: RESOLVED | Noted: 2025-01-13 | Resolved: 2025-04-07

## 2025-04-07 PROBLEM — B37.9 CANDIDA INFECTION: Status: RESOLVED | Noted: 2023-12-13 | Resolved: 2025-04-07

## 2025-04-07 PROBLEM — B96.89 ACUTE BACTERIAL SINUSITIS: Status: RESOLVED | Noted: 2025-01-13 | Resolved: 2025-04-07

## 2025-04-07 PROBLEM — J40 BRONCHITIS: Status: RESOLVED | Noted: 2025-01-22 | Resolved: 2025-04-07

## 2025-04-07 PROCEDURE — 3075F SYST BP GE 130 - 139MM HG: CPT | Performed by: FAMILY MEDICINE

## 2025-04-07 PROCEDURE — 99214 OFFICE O/P EST MOD 30 MIN: CPT | Performed by: FAMILY MEDICINE

## 2025-04-07 PROCEDURE — G2211 COMPLEX E/M VISIT ADD ON: HCPCS | Performed by: FAMILY MEDICINE

## 2025-04-07 PROCEDURE — 3078F DIAST BP <80 MM HG: CPT | Performed by: FAMILY MEDICINE

## 2025-04-07 RX ORDER — ONDANSETRON 4 MG/1
4 TABLET, FILM COATED ORAL EVERY 8 HOURS PRN
Qty: 30 TABLET | Refills: 0 | Status: SHIPPED | OUTPATIENT
Start: 2025-04-07

## 2025-04-07 RX ORDER — LORAZEPAM 0.5 MG/1
0.5 TABLET ORAL EVERY 8 HOURS PRN
Qty: 30 TABLET | Refills: 0 | Status: SHIPPED | OUTPATIENT
Start: 2025-04-07 | End: 2025-05-07

## 2025-04-07 RX ORDER — LANSOPRAZOLE 30 MG/1
30 CAPSULE, DELAYED RELEASE ORAL DAILY
COMMUNITY

## 2025-04-07 RX ORDER — CIPROFLOXACIN 500 MG/1
500 TABLET, FILM COATED ORAL 2 TIMES DAILY
Qty: 20 TABLET | Refills: 0 | Status: SHIPPED | OUTPATIENT
Start: 2025-04-07 | End: 2025-04-17

## 2025-04-07 RX ORDER — SCOPOLAMINE 1 MG/3D
1 PATCH, EXTENDED RELEASE TRANSDERMAL
Qty: 4 PATCH | Refills: 0 | Status: SHIPPED | OUTPATIENT
Start: 2025-04-07

## 2025-04-07 NOTE — PROGRESS NOTES
Here for f/u, overall doing great.  Pt will be leaving next Monday for a 12 day cruise.  Pt will be going from [facility], and then all over [place].  Pt on NCL.  Pt is feeling well.  Pt did see dr. Helms for her RA and things are doing well, on orencia monthly and has been doing well, keeping her stable.      Pt did get the flu a few weeks ago, and sx were moderate to severe with moderate malaise, and cough but sx have resolved at this time.      Work is doing well, staying busy but it is doing well.  [place].  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  Pt is working on continuing with counseling.  Pt is concerned with weight, is working on consistency of diet/exercise    Pt did have EGD and colonoscopy that was fine but did show a nodule in esopagus that was negative based on bx, but will have EGD/EUS with dr. Correa      Except as noted above in the history of present illness, the review of systems is  negative for headache, vision changes, chest pain, shortness of breath, abdominal pain, urinary sx, bowel changes.    Past medical, surgical, and social history reviewed and updated  Medications and allergies reviewed and updated      O: /66   Pulse 78   Temp 97.3 °F (36.3 °C) (Temporal)   Resp 16   Ht 1.753 m (5' 9\")   Wt 104.3 kg (230 lb)   SpO2 98%   BMI 33.97 kg/m²   GEN: No acute distress, cooperative, well nourished, alert.   HEENT: PEERLA, EOMI , normocephalic/atraumatic, nares and oropharynx clear.  Mucous membranes normal, Tympanic membranes clear bilaterally.  Neck: soft, supple, no thyromegaly, mass, no Lymphadenopathy  CV: Regular rate and rhythm, no murmur, rubs, gallops. No edema.  Resp: Clear to auscultation bilaterally good air entry bilaterally  No crackles, wheeze. Breathing comfortably.   Psych: mood stable, No

## 2025-04-24 NOTE — DISCHARGE INSTRUCTIONS
abatacept  Pronunciation:  neo BAY ta sept  Brand:  Garett  What is the most important information I should know about abatacept?  Follow all directions on your medicine label and package. Tell each of your healthcare providers about all your medical conditions, allergies, and all medicines you use.  What is abatacept?  Abatacept a protein that prevents your body's immune system from attacking healthy tissues such as joints. The immune system helps your body fight infections. In people with autoimmune disorders, the immune system mistakes the body's own cells for invaders and attacks them.  Abatacept is used to treat the symptoms of rheumatoid arthritis, and to prevent joint damage caused by these conditions. This medicine is for adults and children who are at least 2 years old.  Abatacept is also used to treat active psoriatic arthritis in adults.  Abatacept is not a cure for any autoimmune disorder and will only treat the symptoms of your condition.  Abatacept may also be used for purposes not listed in this medication guide.  What should I discuss with my healthcare provider before using abatacept?  You should not use abatacept if you are allergic to it.  Before using abatacept, tell your doctor if you have ever had tuberculosis, if anyone in your household has tuberculosis, or if you have recently traveled to an area where tuberculosis is common.  To make sure abatacept is safe for you, tell your doctor if you have ever had:  a weak immune system;  any type of infection including a skin infection or open sores;  infections that go away and come back;  COPD (chronic obstructive pulmonary disease);  diabetes;  hepatitis; or  if you are scheduled to receive any vaccines.  Using abatacept may increase your risk of developing certain types of cancer such as lymphoma (cancer of the lymph nodes). This risk may be greater in older adults. Talk to your doctor about your specific risk.  It is not known whether  mumps, rubella (MMR), polio, rotavirus, typhoid, yellow fever, varicella (chickenpox), zoster (shingles), and nasal flu (influenza) vaccine.  Avoid being near people who are sick or have infections. Tell your doctor at once if you develop signs of infection.  What are the possible side effects of abatacept?  Some side effects may occur during the injection. Tell your caregiver right away if you feel dizzy, light-headed, itchy, or have a severe headache or trouble breathing within 1 hour after receiving the injection.  Get emergency medical help if you have signs of an allergic reaction:  hives; difficulty breathing; swelling of your face, lips, tongue, or throat.  Serious and sometimes fatal infections may occur during treatment with abatacept. Stop using this medicine and call your doctor right away if you have signs of infection such as:  fever, chills, night sweats, flu symptoms, weight loss;  feeling very tired;  dry cough, sore throat; or  warmth, pain, or redness of your skin.  Call your doctor at once if you have any of these other serious side effects:  trouble breathing;  stabbing chest pain, wheezing, cough with yellow or green mucus;  pain or burning when you urinate; or  signs of skin infection such as itching, swelling, warmth, redness, or oozing.  Common side effects may include:  fever;  nausea, diarrhea, stomach pain;  headache; or  cold symptoms such as stuffy nose, sneezing, sore throat, cough.  This is not a complete list of side effects and others may occur. Call your doctor for medical advice about side effects. You may report side effects to FDA at 6-720-FDA-2222.  What other drugs will affect abatacept?  Tell your doctor about all your current medicines and any you start or stop using, especially:  anakinra (Kineret);  adalimumab (Humira);  certolizumab (Cimzia);  etanercept (Enbrel);  golimumab (Simponi);  infliximab (Remicade);  rituximab (Rituxan); or  tocilizumab (Actemra).  This list is

## 2025-04-30 ENCOUNTER — CLINICAL DOCUMENTATION (OUTPATIENT)
Facility: HOSPITAL | Age: 60
End: 2025-04-30

## 2025-04-30 NOTE — PROGRESS NOTES
Patient Assistance    Met with:     Navigator Type: Infusion  Documentation Type: Assistance Review  Status of Patient Insurance Coverage: Patient has active coverage          Additional notes:     Other Drug Name: Orencia  Form of PAP Assistance: Copay / Foundation (Approved)

## 2025-05-01 ENCOUNTER — OFFICE VISIT (OUTPATIENT)
Dept: ORTHOPEDIC SURGERY | Age: 60
End: 2025-05-01
Payer: COMMERCIAL

## 2025-05-01 ENCOUNTER — TELEPHONE (OUTPATIENT)
Dept: ORTHOPEDIC SURGERY | Age: 60
End: 2025-05-01

## 2025-05-01 ENCOUNTER — HOSPITAL ENCOUNTER (OUTPATIENT)
Dept: NURSING | Age: 60
Setting detail: INFUSION SERIES
Discharge: HOME | End: 2025-05-01
Payer: COMMERCIAL

## 2025-05-01 VITALS
RESPIRATION RATE: 16 BRPM | BODY MASS INDEX: 34.07 KG/M2 | SYSTOLIC BLOOD PRESSURE: 159 MMHG | HEART RATE: 78 BPM | DIASTOLIC BLOOD PRESSURE: 69 MMHG | WEIGHT: 230 LBS | HEIGHT: 69 IN | TEMPERATURE: 98 F

## 2025-05-01 DIAGNOSIS — M05.79 RHEUMATOID ARTHRITIS INVOLVING MULTIPLE SITES WITH POSITIVE RHEUMATOID FACTOR (HCC): Primary | ICD-10-CM

## 2025-05-01 DIAGNOSIS — M17.12 PRIMARY OSTEOARTHRITIS OF LEFT KNEE: Primary | ICD-10-CM

## 2025-05-01 DIAGNOSIS — M17.11 PRIMARY OSTEOARTHRITIS OF RIGHT KNEE: ICD-10-CM

## 2025-05-01 PROCEDURE — 99211 OFF/OP EST MAY X REQ PHY/QHP: CPT

## 2025-05-01 PROCEDURE — 96365 THER/PROPH/DIAG IV INF INIT: CPT

## 2025-05-01 PROCEDURE — 2580000003 HC RX 258: Performed by: INTERNAL MEDICINE

## 2025-05-01 PROCEDURE — 6360000002 HC RX W HCPCS: Performed by: INTERNAL MEDICINE

## 2025-05-01 PROCEDURE — 99213 OFFICE O/P EST LOW 20 MIN: CPT | Performed by: ORTHOPAEDIC SURGERY

## 2025-05-01 RX ORDER — HYDROCORTISONE SODIUM SUCCINATE 100 MG/2ML
100 INJECTION INTRAMUSCULAR; INTRAVENOUS
OUTPATIENT
Start: 2025-05-29

## 2025-05-01 RX ORDER — EPINEPHRINE 1 MG/ML
0.3 INJECTION, SOLUTION INTRAMUSCULAR; SUBCUTANEOUS PRN
OUTPATIENT
Start: 2025-05-29

## 2025-05-01 RX ORDER — DIPHENHYDRAMINE HYDROCHLORIDE 50 MG/ML
50 INJECTION, SOLUTION INTRAMUSCULAR; INTRAVENOUS
OUTPATIENT
Start: 2025-05-29

## 2025-05-01 RX ORDER — ONDANSETRON 2 MG/ML
8 INJECTION INTRAMUSCULAR; INTRAVENOUS
OUTPATIENT
Start: 2025-05-29

## 2025-05-01 RX ORDER — ALBUTEROL SULFATE 90 UG/1
4 INHALANT RESPIRATORY (INHALATION) PRN
OUTPATIENT
Start: 2025-05-29

## 2025-05-01 RX ORDER — ACETAMINOPHEN 325 MG/1
650 TABLET ORAL
OUTPATIENT
Start: 2025-05-29

## 2025-05-01 RX ORDER — SODIUM CHLORIDE 9 MG/ML
INJECTION, SOLUTION INTRAVENOUS CONTINUOUS
OUTPATIENT
Start: 2025-05-29

## 2025-05-01 RX ADMIN — SODIUM CHLORIDE 1000 MG: 9 INJECTION, SOLUTION INTRAVENOUS at 08:34

## 2025-05-01 ASSESSMENT — PAIN DESCRIPTION - LOCATION: LOCATION: KNEE

## 2025-05-01 ASSESSMENT — PAIN SCALES - GENERAL: PAINLEVEL_OUTOF10: 6

## 2025-05-01 ASSESSMENT — PAIN DESCRIPTION - ORIENTATION: ORIENTATION: RIGHT

## 2025-05-01 ASSESSMENT — PAIN DESCRIPTION - PAIN TYPE: TYPE: CHRONIC PAIN

## 2025-05-01 ASSESSMENT — PAIN DESCRIPTION - DESCRIPTORS: DESCRIPTORS: ACHING

## 2025-05-01 ASSESSMENT — PAIN DESCRIPTION - FREQUENCY: FREQUENCY: CONTINUOUS

## 2025-05-01 NOTE — TELEPHONE ENCOUNTER
I SPOKE WITH THE PATIENT. MRI WAS A NPR SHE CAN GO AHEAD AND CALL [facility].    DR. KNIGHT WILL CALL WITH RESULTS

## 2025-05-01 NOTE — PROGRESS NOTES
Pt here ambulatory for an Orencia infusion   Pt reporting right knee discomfort rated a 6 out of 10 today  IV # 22 started in [facility] on my  first attempt Pt troy well No pre meds were ordered Orencia 1000 mg IVPB started via filter IV tubing  IV site unremarkable Will monitor for reactions

## 2025-05-08 NOTE — PROGRESS NOTES
Patient: Lexie Isaacs  : 1965    MRN: [id number]    Date of Visit: 25    Attending Physician: Clifford Sanchez    History of Present Illness  Ms.. Isaacs is a very pleasant 60 y.o. patient with a several month history of progressive RIGHT knee pain. There is no precipitating event or trauma. The pain is located predominantly in the anterior and lateral aspect of the knee aggravated by weight bearing. Walking even short distances can be painful. Stair climbing is progressing becoming more difficult and painful. However, it can also awaken the patient at night. She has tried the following interventions without sustained functional improvement:  Low-impact, structured therapy/exercise program  NSAID's/Tylenol Arthritis strength  Cortisone injections/viscosupplementation   Knee brace    PMH/PSH:  Past Medical History:   Diagnosis Date    Abnormal Pap smear of cervix     Attention deficit disorder (ADD) without hyperactivity 2024    Autoimmune disorder     Breast disorder     DDD (degenerative disc disease), cervical     resolved s/p surgery    Depression with anxiety     Endometriosis     Essential hypertension 2019    GERD (gastroesophageal reflux disease)     Hemangioma of liver 2022    Hypercholesterolemia     Hyperglycemia     Lumbar strain 2023    Menopause ovarian failure     Migraine     PONV (postoperative nausea and vomiting)     with general anesthesia/ NEEDS MEDICATION AND USES THE PATCH    Spastic colon     Wears glasses     DISTANCE     Patient Active Problem List   Diagnosis    Depression with anxiety    Hyperglycemia    GERD (gastroesophageal reflux disease)    DDD (degenerative disc disease), cervical    Essential hypertension    Hypercholesterolemia    Hemangioma of liver    Leg edema    Rheumatoid arthritis involving multiple sites with positive rheumatoid factor (HCC)    Lumbar strain    Subglottic inflammation    Attention deficit disorder (ADD) without

## 2025-05-29 ENCOUNTER — HOSPITAL ENCOUNTER (OUTPATIENT)
Dept: NURSING | Age: 60
Setting detail: INFUSION SERIES
Discharge: HOME | End: 2025-05-29
Payer: COMMERCIAL

## 2025-05-29 VITALS
HEIGHT: 69 IN | BODY MASS INDEX: 33.47 KG/M2 | WEIGHT: 226 LBS | RESPIRATION RATE: 16 BRPM | TEMPERATURE: 98.1 F | SYSTOLIC BLOOD PRESSURE: 133 MMHG | HEART RATE: 84 BPM | DIASTOLIC BLOOD PRESSURE: 69 MMHG

## 2025-05-29 DIAGNOSIS — M05.79 RHEUMATOID ARTHRITIS INVOLVING MULTIPLE SITES WITH POSITIVE RHEUMATOID FACTOR (HCC): Primary | ICD-10-CM

## 2025-05-29 PROCEDURE — 96365 THER/PROPH/DIAG IV INF INIT: CPT

## 2025-05-29 PROCEDURE — 2580000003 HC RX 258: Performed by: INTERNAL MEDICINE

## 2025-05-29 PROCEDURE — 6360000002 HC RX W HCPCS: Performed by: INTERNAL MEDICINE

## 2025-05-29 PROCEDURE — 99211 OFF/OP EST MAY X REQ PHY/QHP: CPT

## 2025-05-29 RX ORDER — DIPHENHYDRAMINE HYDROCHLORIDE 50 MG/ML
50 INJECTION, SOLUTION INTRAMUSCULAR; INTRAVENOUS
OUTPATIENT
Start: 2025-06-26

## 2025-05-29 RX ORDER — SODIUM CHLORIDE 9 MG/ML
INJECTION, SOLUTION INTRAVENOUS CONTINUOUS
OUTPATIENT
Start: 2025-06-26

## 2025-05-29 RX ORDER — ONDANSETRON 2 MG/ML
8 INJECTION INTRAMUSCULAR; INTRAVENOUS
OUTPATIENT
Start: 2025-06-26

## 2025-05-29 RX ORDER — ALBUTEROL SULFATE 90 UG/1
4 INHALANT RESPIRATORY (INHALATION) PRN
OUTPATIENT
Start: 2025-06-26

## 2025-05-29 RX ORDER — EPINEPHRINE 1 MG/ML
0.3 INJECTION, SOLUTION INTRAMUSCULAR; SUBCUTANEOUS PRN
OUTPATIENT
Start: 2025-06-26

## 2025-05-29 RX ORDER — HYDROCORTISONE SODIUM SUCCINATE 100 MG/2ML
100 INJECTION INTRAMUSCULAR; INTRAVENOUS
OUTPATIENT
Start: 2025-06-26

## 2025-05-29 RX ORDER — ACETAMINOPHEN 325 MG/1
650 TABLET ORAL
OUTPATIENT
Start: 2025-06-26

## 2025-05-29 RX ADMIN — SODIUM CHLORIDE 1000 MG: 9 INJECTION, SOLUTION INTRAVENOUS at 08:28

## 2025-05-29 ASSESSMENT — PAIN DESCRIPTION - FREQUENCY: FREQUENCY: CONTINUOUS

## 2025-05-29 ASSESSMENT — PAIN DESCRIPTION - PAIN TYPE: TYPE: CHRONIC PAIN

## 2025-05-29 ASSESSMENT — PAIN DESCRIPTION - LOCATION: LOCATION: KNEE

## 2025-05-29 ASSESSMENT — PAIN DESCRIPTION - ORIENTATION: ORIENTATION: RIGHT

## 2025-05-29 ASSESSMENT — PAIN DESCRIPTION - DESCRIPTORS: DESCRIPTORS: ACHING

## 2025-05-29 ASSESSMENT — PAIN SCALES - GENERAL: PAINLEVEL_OUTOF10: 5

## 2025-05-29 NOTE — PROGRESS NOTES
Pt here ambulatory for an Orencia infusion   Pt reporting right knee discomfort rated a 5 out of 10 today  IV # 22 started in [facility] on my  first attempt Pt troy well No pre meds were ordered Orencia 1000 mg IVPB started via filter IV tubing  IV site unremarkable Will monitor for reactions

## 2025-06-04 ENCOUNTER — OFFICE VISIT (OUTPATIENT)
Dept: SURGERY | Age: 60
End: 2025-06-04

## 2025-06-04 VITALS
WEIGHT: 226 LBS | SYSTOLIC BLOOD PRESSURE: 142 MMHG | HEIGHT: 69 IN | HEART RATE: 68 BPM | BODY MASS INDEX: 33.47 KG/M2 | DIASTOLIC BLOOD PRESSURE: 85 MMHG

## 2025-06-04 DIAGNOSIS — Z09 POSTOP CHECK: Primary | ICD-10-CM

## 2025-06-04 NOTE — PROGRESS NOTES
[facility]    PROCEDURE: BBR  DATE: 11/7/24    Lexie Isaacs has been recovering well since her procedure. Pain has been well controlled without pain medications.     PMHx:   Past Medical History:   Diagnosis Date    Abnormal Pap smear of cervix     Attention deficit disorder (ADD) without hyperactivity 06/21/2024    Autoimmune disorder     Breast disorder     DDD (degenerative disc disease), cervical     resolved s/p surgery    Depression with anxiety     Endometriosis     Essential hypertension 06/20/2019    GERD (gastroesophageal reflux disease)     Hemangioma of liver 04/12/2022    Hypercholesterolemia     Hyperglycemia     Lumbar strain 09/07/2023    Menopause ovarian failure     Migraine     PONV (postoperative nausea and vomiting)     with general anesthesia/ NEEDS MEDICATION AND USES THE PATCH    Spastic colon     Wears glasses     DISTANCE     PSHx:   Past Surgical History:   Procedure Laterality Date    APPENDECTOMY      BLEPHAROPLASTY Bilateral 09/2024    BREAST REDUCTION SURGERY Bilateral 11/7/2024    Bilateral BREAST REDUCTION performed by Maverick Burns MD at [facility] OR    CERVICAL LAMINECTOMY  2010    CHOLECYSTECTOMY      COLONOSCOPY      COLONOSCOPY  02/07/2022    COLONOSCOPY POLYPECTOMY SNARE/COLD BIOPSY performed by Tonny Atkins MD at [facility] ENDOSCOPY    COLONOSCOPY N/A 3/25/2025    COLONOSCOPY POLYPECTOMY SNARE/BIOPSY performed by Pawel Medina MD at [facility] ENDOSCOPY    ENDOMETRIAL ABLATION      d/t bleeding    LAPAROSCOPY      x3 for endometriosis    UPPER GASTROINTESTINAL ENDOSCOPY N/A 10/29/2020    EGD BIOPSY performed by Tonny Atkins MD at [facility] ENDOSCOPY    UPPER GASTROINTESTINAL ENDOSCOPY N/A 02/07/2022    EGD BIOPSY performed by Tonny Atkins MD at [facility] ENDOSCOPY    UPPER GASTROINTESTINAL ENDOSCOPY N/A 3/25/2025    ESOPHAGOGASTRODUODENOSCOPY BIOPSY performed by Pawel Medina MD at [facility] ENDOSCOPY     Allergy:   Allergies   Allergen Reactions    Furosemide

## 2025-06-09 NOTE — PROGRESS NOTES
ENDOSCOPY PREOP INSTRUCTIONS      Please at arrival time given to you from your doctor's office.  Report to the MAIN entrance on [address] and register at the surgery center on the left-hand side of the lobby  You will need your insurance card and photo id and a list of all medications taken on a regular basis. Please include the dose/frequency.    For your procedure:     PLEASE FOLLOW ALL INSTRUCTIONS & PREPS GIVEN TO YOU BY YOUR DOCTOR'S OFFICE.    Please make sure you bring the name of bowel prep & any meds you took prior to arrival.  If you have not received these instructions yet, please call the office immediately. Make sure to read them as soon as received.   If you are taking blood thinners, Aspirin or diabetic medication, make sure to call your doctor as soon as possible for instructions prior to your procedure.  Please dress comfortably and do not wear any lotion, powders or jewelry  If you use oxygen at home, please bring your oxygen tank with you to hospital.  Arrange for someone to be with you and sign you out & drive you home after your procedure.  THIS PERSON MUST WAIT AT HOSPITAL THE ENTIRE TIME.  WOMEN ONLY OF CHILDBEARING AGE: Please make sure to be able to give a urine sample on arrival      If you have further questions, you may contact your Endoscopist's office

## 2025-06-19 ENCOUNTER — HOSPITAL ENCOUNTER (OUTPATIENT)
Age: 60
Setting detail: OUTPATIENT SURGERY
Discharge: HOME | End: 2025-06-19
Attending: INTERNAL MEDICINE | Admitting: INTERNAL MEDICINE
Payer: COMMERCIAL

## 2025-06-19 ENCOUNTER — ANESTHESIA (OUTPATIENT)
Dept: ENDOSCOPY | Age: 60
End: 2025-06-19
Payer: COMMERCIAL

## 2025-06-19 ENCOUNTER — APPOINTMENT (OUTPATIENT)
Dept: ENDOSCOPY | Age: 60
End: 2025-06-19
Attending: INTERNAL MEDICINE
Payer: COMMERCIAL

## 2025-06-19 ENCOUNTER — ANESTHESIA EVENT (OUTPATIENT)
Dept: ENDOSCOPY | Age: 60
End: 2025-06-19
Payer: COMMERCIAL

## 2025-06-19 VITALS
HEIGHT: 69 IN | WEIGHT: 226 LBS | OXYGEN SATURATION: 100 % | BODY MASS INDEX: 33.47 KG/M2 | TEMPERATURE: 97.2 F | RESPIRATION RATE: 16 BRPM | DIASTOLIC BLOOD PRESSURE: 69 MMHG | HEART RATE: 75 BPM | SYSTOLIC BLOOD PRESSURE: 131 MMHG

## 2025-06-19 PROCEDURE — 2580000003 HC RX 258: Performed by: ANESTHESIOLOGY

## 2025-06-19 PROCEDURE — 7100000010 HC PHASE II RECOVERY - FIRST 15 MIN: Performed by: INTERNAL MEDICINE

## 2025-06-19 PROCEDURE — 6360000002 HC RX W HCPCS: Performed by: NURSE ANESTHETIST, CERTIFIED REGISTERED

## 2025-06-19 PROCEDURE — 7100000011 HC PHASE II RECOVERY - ADDTL 15 MIN: Performed by: INTERNAL MEDICINE

## 2025-06-19 PROCEDURE — 3700000001 HC ADD 15 MINUTES (ANESTHESIA): Performed by: INTERNAL MEDICINE

## 2025-06-19 PROCEDURE — 3700000000 HC ANESTHESIA ATTENDED CARE: Performed by: INTERNAL MEDICINE

## 2025-06-19 PROCEDURE — 3609018500 HC EGD US SCOPE W/ADJACENT STRUCTURES: Performed by: INTERNAL MEDICINE

## 2025-06-19 RX ORDER — LIDOCAINE HYDROCHLORIDE 20 MG/ML
INJECTION, SOLUTION INTRAVENOUS
Status: DISCONTINUED | OUTPATIENT
Start: 2025-06-19 | End: 2025-06-19 | Stop reason: SDUPTHER

## 2025-06-19 RX ORDER — SODIUM CHLORIDE, SODIUM LACTATE, POTASSIUM CHLORIDE, CALCIUM CHLORIDE 600; 310; 30; 20 MG/100ML; MG/100ML; MG/100ML; MG/100ML
INJECTION, SOLUTION INTRAVENOUS CONTINUOUS
Status: DISCONTINUED | OUTPATIENT
Start: 2025-06-19 | End: 2025-06-19 | Stop reason: HOSPADM

## 2025-06-19 RX ORDER — PROPOFOL 10 MG/ML
INJECTION, EMULSION INTRAVENOUS
Status: DISCONTINUED | OUTPATIENT
Start: 2025-06-19 | End: 2025-06-19 | Stop reason: SDUPTHER

## 2025-06-19 RX ADMIN — PROPOFOL 50 MG: 10 INJECTION, EMULSION INTRAVENOUS at 09:25

## 2025-06-19 RX ADMIN — PROPOFOL 50 MG: 10 INJECTION, EMULSION INTRAVENOUS at 09:31

## 2025-06-19 RX ADMIN — LIDOCAINE HYDROCHLORIDE 100 MG: 20 INJECTION, SOLUTION INTRAVENOUS at 09:17

## 2025-06-19 RX ADMIN — PROPOFOL 50 MG: 10 INJECTION, EMULSION INTRAVENOUS at 09:28

## 2025-06-19 RX ADMIN — SODIUM CHLORIDE, SODIUM LACTATE, POTASSIUM CHLORIDE, AND CALCIUM CHLORIDE: .6; .31; .03; .02 INJECTION, SOLUTION INTRAVENOUS at 08:10

## 2025-06-19 RX ADMIN — PROPOFOL 50 MG: 10 INJECTION, EMULSION INTRAVENOUS at 09:22

## 2025-06-19 RX ADMIN — PROPOFOL 50 MG: 10 INJECTION, EMULSION INTRAVENOUS at 09:17

## 2025-06-19 RX ADMIN — PROPOFOL 50 MG: 10 INJECTION, EMULSION INTRAVENOUS at 09:19

## 2025-06-19 ASSESSMENT — PAIN SCALES - GENERAL
PAINLEVEL_OUTOF10: 0

## 2025-06-19 NOTE — DISCHARGE INSTRUCTIONS
know your health care wishes. It's a good thing to have before any type of surgery or procedure.   What happens on the day of the procedure?    Follow the instructions exactly about when to stop eating and drinking. If you don't, your surgery may be canceled. If your doctor told you to take your medicines on the day of surgery, take them with only a sip of water.     Follow your doctor's instructions about when to bathe or shower before your surgery. Do not apply lotions, perfumes, deodorants, or nail polish.     Take off all jewelry and piercings. And take out contact lenses, if you wear them.   At the hospital or surgery center    Bring a picture ID.     The procedure will take about 15 to 30 minutes.     The doctor may spray medicine on the back of your throat to numb it. You also will get medicine to prevent pain and to relax you.     You will stay at the hospital or surgery center for 1 to 2 hours until the medicine you were given wears off.   When should you call your doctor?   You have questions or concerns.     You don't understand how to prepare for your procedure.     You become ill before the procedure (such as fever, flu, or a cold).     You need to reschedule or have changed your mind about having the procedure.   Where can you learn more?  Go to https://[email or web address].net/patientEd and enter E469 to learn more about \"Esophageal Dilation: Before Your Procedure.\"  Current as of: October 19, 2024  Content Version: 14.5  © 5386-3785 [employer].   Care instructions adapted under license by [facility]. If you have questions about a medical condition or this instruction, always ask your healthcare professional. [employer] [facility], [employer], disclaims any warranty or liability for your use of this information.            Please review these discharge instructions this evening or tomorrow for  information you may have forgotten.            We want to thank you for choosing the [facility] as  your health care provider. We always strive to provide you with excellent care while you are here. You may receive a survey in the mail regarding your care. We would appreciate you taking a few minutes of your time to complete this survey.

## 2025-06-19 NOTE — ANESTHESIA POSTPROCEDURE EVALUATION
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    Wilda Phase I: Wilda Score: 10    Wilda Phase II: Wilda Score: 10    Anesthesia Post Evaluation    Patient location during evaluation: PACU  Patient participation: complete - patient participated  Level of consciousness: awake  Pain score: 0  Airway patency: patent  Nausea & Vomiting: no nausea  Cardiovascular status: hemodynamically stable  Respiratory status: acceptable  Hydration status: stable  Pain management: adequate    No notable events documented.

## 2025-06-19 NOTE — PROGRESS NOTES
Ambulatory Surgery/Procedure Discharge Note    Vitals:    06/19/25 1000   BP: 131/61   Pulse: 70   Resp: 16   Temp:    SpO2: 99%       No intake/output data recorded.    Restroom use offered before discharge.  Yes    Pain assessment:  level of pain (1-10, 10 severe),   Pain Level: 0    Pt and S.O./family states \"ready to go home\". Pt alert and oriented x4. IV removed. Denies N/V or pain.  Voided prior to discharge. Pt tolerating po intake. Discharge instructions given to pt and friend with pt permission. Pt and friend verbalized understanding of all instructions. Left with all belongings,  prescriptions, and discharge instructions.     Patient discharged to home/self care. Patient discharged via wheel chair by transporter to waiting family/S.O.       6/19/2025 10:09 AM

## 2025-06-19 NOTE — PROCEDURES
PROCEDURE NOTE  Date: 2025   Name: Lexie Isaacs  YOB: 1965    Procedures  EGD  Endoscopic ultrasound exam                Endoscopy Note    Patient: Lexie Isaacs  : 1965  Acct#:     Procedure:   Esophagogastroduodenoscopy / Endoscopic ultrasound exam    Date:  2025     Surgeon:  Dale Correa MD,     Referring Physician:  Reilly Canales MD      Preoperative Diagnosis:    Esophageal nodule seen on recent EGD exam      Anesthesia: MAC anesthesia      Consent:  The patient or their legal guardian has signed an informed consent, and is aware of the potential risks, benefits, alternatives, and potential complications of this procedure.  These include, but are not limited to hemorrhage, bleeding, post procedural pain, perforation, phlebitis, aspiration, hypotension, hypoxia, cardiovascular events such as arryhthmia, and possibly death.     Description of Procedure: The patient was then taken to the endoscopy suite, placed in the left lateral decubitus position and the above IV sedation was administrered.  The Olympus video endoscope was placed through the patient's oropharynx without difficulty to the extent of the 2nd portion of the duodenum. The patient tolerated the procedure well and was taken to the post anesthesia care unit in good condition.    Complications: None  EBL: none      EGD Findings:  Esophagus:  Visualization of the esophagus demonstrated 6 mm submucosal nodule seen at 20 cm.     Stomach:  The scope was then advanced into the stomach.  Both forward and retroflexed views of the stomach were obtained.  Visualization of the gastric body and antrum demonstrated mild diffuse erythematous mucosa seen.  A retroflexed exam of the gastric cardia and fundus demonstrated normal mucosa.  The pylorus was patent and the scope was advanced into the duodenum.  Duodenum: Visualization of the duodenal bulb and the second portion of the duodenum demonstrated normal

## 2025-06-19 NOTE — ANESTHESIA PRE PROCEDURE
Department of Anesthesiology  Preprocedure Note       Name:  Lexie Isaacs   Age:  60 y.o.  :  1965                                          MRN:  [id number]         Date:  2025      Surgeon: Surgeon(s):  Dale Correa MD    Procedure: Procedure(s):  ESOPHAGOGASTRODUODENOSCOPY ULTRASOUND    Medications prior to admission:   Prior to Admission medications    Medication Sig Start Date End Date Taking? Authorizing Provider   lansoprazole (PREVACID) 30 MG delayed release capsule Take 1 capsule by mouth daily    Nina Castillo MD   ondansetron (ZOFRAN) 4 MG tablet Take 1 tablet by mouth every 8 hours as needed for Nausea or Vomiting 25   Reilly aCnales MD   scopolamine (TRANSDERM-SCOP) transdermal patch Place 1 patch onto the skin every 72 hours 25   Reilly Canales MD   Abatacept (ORENCIA IV) Infuse 1,000 mg intravenously every 28 days Last dose given on 25    Nina Castillo MD   rosuvastatin (CRESTOR) 10 MG tablet TAKE ONE TABLET BY MOUTH EVERY EVENING AT BEDTIME 3/30/25   Reilly Canales MD   triamterene-hydroCHLOROthiazide (MAXZIDE) 75-50 MG per tablet TAKE ONE TABLET BY MOUTH ONCE A DAY 3/30/25   Reilly Canales MD   tiZANidine (ZANAFLEX) 4 MG tablet TAKE ONE TABLET BY MOUTH EVERY 8 HOURS AS NEEDED FOR MUSCLE SPASMS 25   Reilly Canales MD   naproxen (NAPROSYN) 500 MG tablet Take 1 tablet by mouth 2 times daily (with meals) 25  Nawaf Wallace PA-C   traZODone (DESYREL) 50 MG tablet TAKE ONE TO TWO TABLETS BY MOUTH EVERY NIGHT 24   Reilly Canales MD   escitalopram (LEXAPRO) 10 MG tablet Take 1.5 tablets by mouth daily 24   Reilly Canales MD   ondansetron (ZOFRAN) 4 MG tablet Take 1 tablet by mouth daily as needed for Nausea or Vomiting 3/21/23   Reilly Canales MD   Omega-3 Fatty Acids (FISH OIL) 1000 MG CAPS Take 1 capsule by mouth daily    Nina Castillo MD   Multiple Vitamins-Minerals (THERAPEUTIC

## 2025-06-19 NOTE — H&P
Pre-operative History and Physical    Patient: Lexie Isaacs  : 1965  Acct#:     History Obtained From:  patient    HISTORY OF PRESENT ILLNESS:    The patient is a 60 y.o. female who presents with esophageal nodule    Past Medical History:        Diagnosis Date    Abnormal Pap smear of cervix     Attention deficit disorder (ADD) without hyperactivity 2024    Autoimmune disorder     Breast disorder     DDD (degenerative disc disease), cervical     resolved s/p surgery    Depression with anxiety     Endometriosis     Essential hypertension 2019    GERD (gastroesophageal reflux disease)     Hemangioma of liver 2022    Hypercholesterolemia     Hyperglycemia     Lumbar strain 2023    Menopause ovarian failure     Migraine     PONV (postoperative nausea and vomiting)     with general anesthesia/ NEEDS MEDICATION AND USES THE PATCH    Spastic colon     Wears glasses     DISTANCE     Past Surgical History:        Procedure Laterality Date    APPENDECTOMY      BLEPHAROPLASTY Bilateral 2024    BREAST REDUCTION SURGERY Bilateral 2024    Bilateral BREAST REDUCTION performed by Maverick Burns MD at [facility] OR    CERVICAL LAMINECTOMY      CHOLECYSTECTOMY      COLONOSCOPY      COLONOSCOPY  2022    COLONOSCOPY POLYPECTOMY SNARE/COLD BIOPSY performed by Tonny Atkins MD at [facility] ENDOSCOPY    COLONOSCOPY N/A 3/25/2025    COLONOSCOPY POLYPECTOMY SNARE/BIOPSY performed by Pawel Medina MD at [facility] ENDOSCOPY    ENDOMETRIAL ABLATION      d/t bleeding    LAPAROSCOPY      x3 for endometriosis    UPPER GASTROINTESTINAL ENDOSCOPY N/A 10/29/2020    EGD BIOPSY performed by Tonny Atkins MD at [facility] ENDOSCOPY    UPPER GASTROINTESTINAL ENDOSCOPY N/A 2022    EGD BIOPSY performed by Tonny Atkins MD at [facility] ENDOSCOPY    UPPER GASTROINTESTINAL ENDOSCOPY N/A 3/25/2025    ESOPHAGOGASTRODUODENOSCOPY BIOPSY performed by Pawel Medina MD at [facility] ENDOSCOPY     Medications Prior

## 2025-06-20 NOTE — DISCHARGE INSTRUCTIONS
abatacept  Pronunciation:  neo BAY ta sept  Brand:  Garett  What is the most important information I should know about abatacept?  Follow all directions on your medicine label and package. Tell each of your healthcare providers about all your medical conditions, allergies, and all medicines you use.  What is abatacept?  Abatacept a protein that prevents your body's immune system from attacking healthy tissues such as joints. The immune system helps your body fight infections. In people with autoimmune disorders, the immune system mistakes the body's own cells for invaders and attacks them.  Abatacept is used to treat the symptoms of rheumatoid arthritis, and to prevent joint damage caused by these conditions. This medicine is for adults and children who are at least 2 years old.  Abatacept is also used to treat active psoriatic arthritis in adults.  Abatacept is not a cure for any autoimmune disorder and will only treat the symptoms of your condition.  Abatacept may also be used for purposes not listed in this medication guide.  What should I discuss with my healthcare provider before using abatacept?  You should not use abatacept if you are allergic to it.  Before using abatacept, tell your doctor if you have ever had tuberculosis, if anyone in your household has tuberculosis, or if you have recently traveled to an area where tuberculosis is common.  To make sure abatacept is safe for you, tell your doctor if you have ever had:  a weak immune system;  any type of infection including a skin infection or open sores;  infections that go away and come back;  COPD (chronic obstructive pulmonary disease);  diabetes;  hepatitis; or  if you are scheduled to receive any vaccines.  Using abatacept may increase your risk of developing certain types of cancer such as lymphoma (cancer of the lymph nodes). This risk may be greater in older adults. Talk to your doctor about your specific risk.  It is not known whether  not complete. Other drugs may interact with abatacept, including prescription and over-the-counter medicines, vitamins, and herbal products. Not all possible interactions are listed in this medication guide.  Where can I get more information?  Your doctor or pharmacist can provide more information about abatacept.    RETURN ON JULY 24, 2025 AT 0800 FOR ORENCIA INFUSION  FOLLOW UP WITH DR. CAGLE AS NEEDED OR SCHEDULED  CALL US IF YOU NEED TO CANCEL OR RESCHEDULE  YOUR APPOINTMENT   [phone]

## 2025-06-26 ENCOUNTER — HOSPITAL ENCOUNTER (OUTPATIENT)
Dept: NURSING | Age: 60
Setting detail: INFUSION SERIES
Discharge: HOME | End: 2025-06-26
Payer: COMMERCIAL

## 2025-06-26 VITALS
HEART RATE: 76 BPM | RESPIRATION RATE: 16 BRPM | TEMPERATURE: 97.7 F | BODY MASS INDEX: 33.47 KG/M2 | WEIGHT: 226 LBS | SYSTOLIC BLOOD PRESSURE: 143 MMHG | HEIGHT: 69 IN | DIASTOLIC BLOOD PRESSURE: 67 MMHG

## 2025-06-26 DIAGNOSIS — M05.79 RHEUMATOID ARTHRITIS INVOLVING MULTIPLE SITES WITH POSITIVE RHEUMATOID FACTOR (HCC): Primary | ICD-10-CM

## 2025-06-26 PROCEDURE — 2580000003 HC RX 258: Performed by: INTERNAL MEDICINE

## 2025-06-26 PROCEDURE — 96365 THER/PROPH/DIAG IV INF INIT: CPT

## 2025-06-26 PROCEDURE — 99211 OFF/OP EST MAY X REQ PHY/QHP: CPT

## 2025-06-26 PROCEDURE — 6360000002 HC RX W HCPCS: Performed by: INTERNAL MEDICINE

## 2025-06-26 RX ORDER — ONDANSETRON 2 MG/ML
8 INJECTION INTRAMUSCULAR; INTRAVENOUS
OUTPATIENT
Start: 2025-07-24

## 2025-06-26 RX ORDER — ACETAMINOPHEN 325 MG/1
650 TABLET ORAL
OUTPATIENT
Start: 2025-07-24

## 2025-06-26 RX ORDER — HYDROCORTISONE SODIUM SUCCINATE 100 MG/2ML
100 INJECTION INTRAMUSCULAR; INTRAVENOUS
OUTPATIENT
Start: 2025-07-24

## 2025-06-26 RX ORDER — SODIUM CHLORIDE 9 MG/ML
INJECTION, SOLUTION INTRAVENOUS CONTINUOUS
OUTPATIENT
Start: 2025-07-24

## 2025-06-26 RX ORDER — DIPHENHYDRAMINE HYDROCHLORIDE 50 MG/ML
50 INJECTION, SOLUTION INTRAMUSCULAR; INTRAVENOUS
OUTPATIENT
Start: 2025-07-24

## 2025-06-26 RX ORDER — EPINEPHRINE 1 MG/ML
0.3 INJECTION, SOLUTION INTRAMUSCULAR; SUBCUTANEOUS PRN
OUTPATIENT
Start: 2025-07-24

## 2025-06-26 RX ORDER — ALBUTEROL SULFATE 90 UG/1
4 INHALANT RESPIRATORY (INHALATION) PRN
OUTPATIENT
Start: 2025-07-24

## 2025-06-26 RX ADMIN — SODIUM CHLORIDE 1000 MG: 9 INJECTION, SOLUTION INTRAVENOUS at 08:45

## 2025-06-26 ASSESSMENT — PAIN SCALES - GENERAL: PAINLEVEL_OUTOF10: 4

## 2025-06-26 NOTE — PROGRESS NOTES
Pt here ambulatory for an Orencia infusion   Pt reporting right knee discomfort rated a 4 out of 10 today  IV # 22 started in [facility] on  first attempt per S Kesha GORE  Pt troy well No pre meds were ordered Orencia 1000 mg IVPB started via filter IV tubing  IV site unremarkable Will monitor for reactions

## 2025-07-22 NOTE — DISCHARGE INSTRUCTIONS
mumps, rubella (MMR), polio, rotavirus, typhoid, yellow fever, varicella (chickenpox), zoster (shingles), and nasal flu (influenza) vaccine.  Avoid being near people who are sick or have infections. Tell your doctor at once if you develop signs of infection.  What are the possible side effects of abatacept?  Some side effects may occur during the injection. Tell your caregiver right away if you feel dizzy, light-headed, itchy, or have a severe headache or trouble breathing within 1 hour after receiving the injection.  Get emergency medical help if you have signs of an allergic reaction:  hives; difficulty breathing; swelling of your face, lips, tongue, or throat.  Serious and sometimes fatal infections may occur during treatment with abatacept. Stop using this medicine and call your doctor right away if you have signs of infection such as:  fever, chills, night sweats, flu symptoms, weight loss;  feeling very tired;  dry cough, sore throat; or  warmth, pain, or redness of your skin.  Call your doctor at once if you have any of these other serious side effects:  trouble breathing;  stabbing chest pain, wheezing, cough with yellow or green mucus;  pain or burning when you urinate; or  signs of skin infection such as itching, swelling, warmth, redness, or oozing.  Common side effects may include:  fever;  nausea, diarrhea, stomach pain;  headache; or  cold symptoms such as stuffy nose, sneezing, sore throat, cough.  This is not a complete list of side effects and others may occur. Call your doctor for medical advice about side effects. You may report side effects to FDA at 6-919-FDA-9858.  What other drugs will affect abatacept?  Tell your doctor about all your current medicines and any you start or stop using, especially:  anakinra (Kineret);  adalimumab (Humira);  certolizumab (Cimzia);  etanercept (Enbrel);  golimumab (Simponi);  infliximab (Remicade);  rituximab (Rituxan); or  tocilizumab (Actemra).  This list is

## 2025-07-30 ENCOUNTER — HOSPITAL ENCOUNTER (OUTPATIENT)
Dept: NURSING | Age: 60
Setting detail: INFUSION SERIES
Discharge: HOME | End: 2025-07-30
Payer: COMMERCIAL

## 2025-07-30 VITALS
WEIGHT: 229 LBS | TEMPERATURE: 98.2 F | DIASTOLIC BLOOD PRESSURE: 67 MMHG | RESPIRATION RATE: 16 BRPM | HEART RATE: 75 BPM | SYSTOLIC BLOOD PRESSURE: 150 MMHG | HEIGHT: 69 IN | BODY MASS INDEX: 33.92 KG/M2

## 2025-07-30 DIAGNOSIS — M05.79 RHEUMATOID ARTHRITIS INVOLVING MULTIPLE SITES WITH POSITIVE RHEUMATOID FACTOR (HCC): Primary | ICD-10-CM

## 2025-07-30 PROCEDURE — 2580000003 HC RX 258: Performed by: INTERNAL MEDICINE

## 2025-07-30 PROCEDURE — 6360000002 HC RX W HCPCS: Performed by: INTERNAL MEDICINE

## 2025-07-30 PROCEDURE — 99211 OFF/OP EST MAY X REQ PHY/QHP: CPT

## 2025-07-30 PROCEDURE — 96365 THER/PROPH/DIAG IV INF INIT: CPT

## 2025-07-30 RX ORDER — HYDROCORTISONE SODIUM SUCCINATE 100 MG/2ML
100 INJECTION INTRAMUSCULAR; INTRAVENOUS
OUTPATIENT
Start: 2025-08-20

## 2025-07-30 RX ORDER — ACETAMINOPHEN 325 MG/1
650 TABLET ORAL
OUTPATIENT
Start: 2025-08-20

## 2025-07-30 RX ORDER — ALBUTEROL SULFATE 90 UG/1
4 INHALANT RESPIRATORY (INHALATION) PRN
OUTPATIENT
Start: 2025-08-20

## 2025-07-30 RX ORDER — DIPHENHYDRAMINE HYDROCHLORIDE 50 MG/ML
50 INJECTION, SOLUTION INTRAMUSCULAR; INTRAVENOUS
OUTPATIENT
Start: 2025-08-20

## 2025-07-30 RX ORDER — SODIUM CHLORIDE 9 MG/ML
INJECTION, SOLUTION INTRAVENOUS CONTINUOUS
OUTPATIENT
Start: 2025-08-20

## 2025-07-30 RX ORDER — EPINEPHRINE 1 MG/ML
0.3 INJECTION, SOLUTION INTRAMUSCULAR; SUBCUTANEOUS PRN
OUTPATIENT
Start: 2025-08-20

## 2025-07-30 RX ORDER — ONDANSETRON 2 MG/ML
8 INJECTION INTRAMUSCULAR; INTRAVENOUS
OUTPATIENT
Start: 2025-08-20

## 2025-07-30 RX ADMIN — SODIUM CHLORIDE 1000 MG: 9 INJECTION, SOLUTION INTRAVENOUS at 08:22

## 2025-07-30 ASSESSMENT — PAIN DESCRIPTION - PAIN TYPE: TYPE: CHRONIC PAIN

## 2025-07-30 ASSESSMENT — PAIN DESCRIPTION - DESCRIPTORS: DESCRIPTORS: ACHING

## 2025-07-30 ASSESSMENT — PAIN DESCRIPTION - LOCATION: LOCATION: FOOT;HAND

## 2025-07-30 ASSESSMENT — PAIN SCALES - GENERAL: PAINLEVEL_OUTOF10: 6

## 2025-07-30 ASSESSMENT — PAIN DESCRIPTION - ORIENTATION: ORIENTATION: RIGHT;LEFT

## 2025-07-30 ASSESSMENT — PAIN DESCRIPTION - FREQUENCY: FREQUENCY: CONTINUOUS

## 2025-07-30 NOTE — PROGRESS NOTES
Pt here ambulatory for an Orencia infusion   Pt reporting lance hand and feet discomfort rated a 6 out of 10 today  IV # 22 started in [facility] on my  first attempt Pt troy well No pre meds were ordered Orencia 1000 mg IVPB started via filter IV tubing  IV site unremarkable Will monitor for reactions

## 2025-07-31 ENCOUNTER — TELEMEDICINE (OUTPATIENT)
Dept: FAMILY MEDICINE CLINIC | Age: 60
End: 2025-07-31

## 2025-07-31 DIAGNOSIS — F41.8 DEPRESSION WITH ANXIETY: ICD-10-CM

## 2025-07-31 DIAGNOSIS — M05.79 RHEUMATOID ARTHRITIS INVOLVING MULTIPLE SITES WITH POSITIVE RHEUMATOID FACTOR (HCC): ICD-10-CM

## 2025-07-31 DIAGNOSIS — F43.9 STRESS: ICD-10-CM

## 2025-07-31 DIAGNOSIS — E78.00 HYPERCHOLESTEROLEMIA: ICD-10-CM

## 2025-07-31 DIAGNOSIS — I10 ESSENTIAL HYPERTENSION: Primary | ICD-10-CM

## 2025-07-31 DIAGNOSIS — M17.12 PRIMARY OSTEOARTHRITIS OF LEFT KNEE: ICD-10-CM

## 2025-07-31 LAB
CHOLEST SERPL-MCNC: 164 MG/DL (ref 0–199)
GLUCOSE SERPL-MCNC: 98 MG/DL (ref 70–99)
HDLC SERPL-MCNC: 41 MG/DL (ref 40–60)
LDLC SERPL CALC-MCNC: 89 MG/DL
TRIGL SERPL-MCNC: 170 MG/DL (ref 0–150)

## 2025-07-31 RX ORDER — LOSARTAN POTASSIUM 50 MG/1
50 TABLET ORAL DAILY
Qty: 30 TABLET | Refills: 5 | Status: SHIPPED | OUTPATIENT
Start: 2025-07-31

## 2025-07-31 RX ORDER — LORAZEPAM 0.5 MG/1
0.5 TABLET ORAL EVERY 8 HOURS PRN
Qty: 30 TABLET | Refills: 2 | Status: SHIPPED | OUTPATIENT
Start: 2025-07-31 | End: 2025-08-30

## 2025-07-31 SDOH — ECONOMIC STABILITY: FOOD INSECURITY: WITHIN THE PAST 12 MONTHS, THE FOOD YOU BOUGHT JUST DIDN'T LAST AND YOU DIDN'T HAVE MONEY TO GET MORE.: NEVER TRUE

## 2025-07-31 SDOH — ECONOMIC STABILITY: FOOD INSECURITY: WITHIN THE PAST 12 MONTHS, YOU WORRIED THAT YOUR FOOD WOULD RUN OUT BEFORE YOU GOT MONEY TO BUY MORE.: NEVER TRUE

## 2025-07-31 NOTE — PROGRESS NOTES
Here for virtual visit and f/u of mood, anxiety    Pt states that things are doing well, staying healthy.  Pt is in the process of working with ortho for consideration of TKR for osteoarthritis in L knee.  Pt will be working to get done in 10/2025.  Pt did get gel shots and cortisone shots and they have provided some mild benefit but now with persistent sx    Pt working with GI, did have    Pt here for follow up of blood pressure.  Pt states doing great with adherence to therapy and feels well.  No issues of chest pain, shortness of breath.  No vision changes, headache, swelling in legs.  Pt is monitoring regularly and seeing systolic blood pressure has been up a bit, in 140-150's.  Pt denies any nausea/vomiting.  Some mild pulsing behind eye    Pt states that stress levels continue to be high, feels counseling and lexapro is doing well.  Pt remains at 15mg of lexapro.        Except as noted above in the history of present illness, the review of systems is  negative for headache, vision changes, chest pain, shortness of breath, abdominal pain, urinary sx, bowel changes.    Past medical, surgical, and social history reviewed and updated  Medications and allergies reviewed and updated          Patient was evaluated through a synchronous (real-time) audio-video encounter. The patient (or guardian if applicable) is aware that this is a billable service, which includes applicable co-pays. This Virtual Visit was conducted with patient's (and/or legal guardian's) consent. Patient identification was verified, and a caregiver was present when appropriate.   The patient was located at Home: [address] [place], [place] [id number]  Provider was located at Facility (Appt Dept): [facility] [place] [id number]  Confirm you are appropriately licensed, registered, or certified to deliver care in the state where the patient is located as indicated above. If you are not or unsure, please re-schedule the

## 2025-08-04 ENCOUNTER — OFFICE VISIT (OUTPATIENT)
Dept: ORTHOPEDIC SURGERY | Age: 60
End: 2025-08-04
Payer: COMMERCIAL

## 2025-08-04 VITALS — HEIGHT: 69 IN | WEIGHT: 229 LBS | BODY MASS INDEX: 33.92 KG/M2

## 2025-08-04 DIAGNOSIS — M17.11 PRIMARY OSTEOARTHRITIS OF RIGHT KNEE: Primary | ICD-10-CM

## 2025-08-04 PROCEDURE — 99214 OFFICE O/P EST MOD 30 MIN: CPT | Performed by: ORTHOPAEDIC SURGERY

## 2025-08-04 RX ORDER — ACETAMINOPHEN 325 MG/1
1000 TABLET ORAL ONCE
OUTPATIENT
Start: 2025-08-04 | End: 2025-08-04

## 2025-08-04 RX ORDER — CELECOXIB 200 MG/1
400 CAPSULE ORAL ONCE
OUTPATIENT
Start: 2025-08-04 | End: 2025-08-04

## 2025-08-04 RX ORDER — SODIUM CHLORIDE 0.9 % (FLUSH) 0.9 %
5-40 SYRINGE (ML) INJECTION EVERY 12 HOURS SCHEDULED
OUTPATIENT
Start: 2025-08-04

## 2025-08-04 RX ORDER — SODIUM CHLORIDE 0.9 % (FLUSH) 0.9 %
5-40 SYRINGE (ML) INJECTION PRN
OUTPATIENT
Start: 2025-08-04

## 2025-08-04 RX ORDER — SODIUM CHLORIDE 9 MG/ML
INJECTION, SOLUTION INTRAVENOUS PRN
OUTPATIENT
Start: 2025-08-04

## 2025-08-20 ENCOUNTER — OFFICE VISIT (OUTPATIENT)
Dept: OBGYN CLINIC | Age: 60
End: 2025-08-20
Payer: COMMERCIAL

## 2025-08-20 VITALS
WEIGHT: 232.8 LBS | BODY MASS INDEX: 34.48 KG/M2 | SYSTOLIC BLOOD PRESSURE: 141 MMHG | HEART RATE: 89 BPM | HEIGHT: 69 IN | DIASTOLIC BLOOD PRESSURE: 64 MMHG

## 2025-08-20 DIAGNOSIS — Z12.4 PAP SMEAR FOR CERVICAL CANCER SCREENING: Primary | ICD-10-CM

## 2025-08-20 PROCEDURE — 99386 PREV VISIT NEW AGE 40-64: CPT | Performed by: OBSTETRICS & GYNECOLOGY

## 2025-08-20 PROCEDURE — 3077F SYST BP >= 140 MM HG: CPT | Performed by: OBSTETRICS & GYNECOLOGY

## 2025-08-20 PROCEDURE — 3078F DIAST BP <80 MM HG: CPT | Performed by: OBSTETRICS & GYNECOLOGY

## 2025-08-22 LAB
HPV HR 12 DNA SPEC QL NAA+PROBE: NOT DETECTED
HPV16 DNA SPEC QL NAA+PROBE: NOT DETECTED
HPV16+18+H RISK 12 DNA SPEC-IMP: NORMAL
HPV18 DNA SPEC QL NAA+PROBE: NOT DETECTED

## 2025-08-27 ENCOUNTER — HOSPITAL ENCOUNTER (OUTPATIENT)
Dept: NURSING | Age: 60
Setting detail: INFUSION SERIES
Discharge: HOME | End: 2025-08-27
Payer: COMMERCIAL

## 2025-08-27 VITALS
TEMPERATURE: 97.8 F | DIASTOLIC BLOOD PRESSURE: 80 MMHG | WEIGHT: 230 LBS | BODY MASS INDEX: 34.07 KG/M2 | HEART RATE: 94 BPM | HEIGHT: 69 IN | RESPIRATION RATE: 16 BRPM | SYSTOLIC BLOOD PRESSURE: 138 MMHG

## 2025-08-27 DIAGNOSIS — M05.79 RHEUMATOID ARTHRITIS INVOLVING MULTIPLE SITES WITH POSITIVE RHEUMATOID FACTOR (HCC): Primary | ICD-10-CM

## 2025-08-27 PROCEDURE — 6360000002 HC RX W HCPCS: Performed by: INTERNAL MEDICINE

## 2025-08-27 PROCEDURE — 2580000003 HC RX 258: Performed by: INTERNAL MEDICINE

## 2025-08-27 PROCEDURE — 99211 OFF/OP EST MAY X REQ PHY/QHP: CPT

## 2025-08-27 PROCEDURE — 96365 THER/PROPH/DIAG IV INF INIT: CPT

## 2025-08-27 RX ORDER — HYDROCORTISONE SODIUM SUCCINATE 100 MG/2ML
100 INJECTION INTRAMUSCULAR; INTRAVENOUS
OUTPATIENT
Start: 2025-09-24

## 2025-08-27 RX ORDER — ACETAMINOPHEN 325 MG/1
650 TABLET ORAL
OUTPATIENT
Start: 2025-09-24

## 2025-08-27 RX ORDER — ONDANSETRON 2 MG/ML
8 INJECTION INTRAMUSCULAR; INTRAVENOUS
OUTPATIENT
Start: 2025-09-24

## 2025-08-27 RX ORDER — ALBUTEROL SULFATE 90 UG/1
4 INHALANT RESPIRATORY (INHALATION) PRN
OUTPATIENT
Start: 2025-09-24

## 2025-08-27 RX ORDER — DIPHENHYDRAMINE HYDROCHLORIDE 50 MG/ML
50 INJECTION, SOLUTION INTRAMUSCULAR; INTRAVENOUS
OUTPATIENT
Start: 2025-09-24

## 2025-08-27 RX ORDER — EPINEPHRINE 1 MG/ML
0.3 INJECTION, SOLUTION INTRAMUSCULAR; SUBCUTANEOUS PRN
OUTPATIENT
Start: 2025-09-24

## 2025-08-27 RX ORDER — SODIUM CHLORIDE 9 MG/ML
INJECTION, SOLUTION INTRAVENOUS CONTINUOUS
OUTPATIENT
Start: 2025-09-24

## 2025-08-27 RX ADMIN — SODIUM CHLORIDE 1000 MG: 9 INJECTION, SOLUTION INTRAVENOUS at 08:41

## 2025-08-27 ASSESSMENT — PAIN DESCRIPTION - FREQUENCY: FREQUENCY: CONTINUOUS

## 2025-08-27 ASSESSMENT — PAIN SCALES - GENERAL: PAINLEVEL_OUTOF10: 6

## 2025-08-27 ASSESSMENT — PAIN DESCRIPTION - PAIN TYPE: TYPE: CHRONIC PAIN

## 2025-08-27 ASSESSMENT — PAIN DESCRIPTION - LOCATION: LOCATION: GENERALIZED

## 2025-08-27 ASSESSMENT — PAIN DESCRIPTION - DESCRIPTORS: DESCRIPTORS: ACHING

## (undated) DEVICE — TRAP SPEC RETRV CLR PLAS POLYP IN LN SUCT QUIK CTCH

## (undated) DEVICE — FORCEPS BX L240CM WRK CHN 2.8MM STD CAP W/ NDL MIC MESH

## (undated) DEVICE — DRAIN SURG 15FR L3 16IN DIA47MM SIL RND HUBLESS FULL FLUT

## (undated) DEVICE — TUBING, SUCTION, 1/4" X 12', STRAIGHT: Brand: MEDLINE

## (undated) DEVICE — NEEDLE SPNL L3.5IN PNK HUB S STL REG WALL FIT STYL W/ QNCKE

## (undated) DEVICE — SYRINGE IRRIG 60ML SFT PLIABLE BLB EZ TO GRP 1 HND USE W/

## (undated) DEVICE — GLOVE ORANGE PI 8 1/2   MSG9085

## (undated) DEVICE — GAUZE FLUFF 1 PLY: Brand: DEROYAL

## (undated) DEVICE — SYRINGE MED 50ML LUERLOCK TIP

## (undated) DEVICE — FORCEPS BX L240CM JAW DIA2.8MM L CAP W/ NDL MIC MESH TOOTH

## (undated) DEVICE — PAD FOAM 11.75X7-7/8 IN RESTON LF

## (undated) DEVICE — SUTURE MONOCRYL SZ 3-0 L27IN ABSRB UD PS-2 3/8 CIR REV CUT NDL MCP427H

## (undated) DEVICE — Device

## (undated) DEVICE — 3M™ TEGADERM™ CHG CHLORHEXIDINE GLUCONATE GEL PAD 1664, 25 EACH/CARTON, 4 CARTONS/CASE: Brand: 3M™ TEGADERM™

## (undated) DEVICE — TOWEL,OR,DSP,ST,BLUE,DLX,8/PK,10PK/CS: Brand: MEDLINE

## (undated) DEVICE — APPLIER LIG CLP M L11IN TI STR RNG HNDL FOR 20 CLP DISP

## (undated) DEVICE — SNARE COLD DIAMOND 10MM THIN

## (undated) DEVICE — MASK CAPNOGRAPHY AD W35IN DIA58IN SAMP LN L10FT O2 LN

## (undated) DEVICE — 3M™ STERI-STRIP™ BLEND TONE SKIN CLOSURES, B1557, TAN, 1/2 IN X 4 IN (12MM X 100MM), 6 STRIPS/ENVELOPE: Brand: 3M™ STERI-STRIP™

## (undated) DEVICE — TOWEL,STOP FLAG GOLD N-W: Brand: MEDLINE

## (undated) DEVICE — BLANKET WRM W40.2XL55.9IN IORT LO BODY + MISTRAL AIR

## (undated) DEVICE — AGENT HEMSTAT 3GM OXIDIZED REGENERATED CELOS ABSRB FOR CONT (ORDER MULTIPLES OF 5EA)

## (undated) DEVICE — INTENDED USE FOR SURGICAL MARKING ON INTACT SKIN, ALSO PROVIDES A PERMANENT METHOD OF IDENTIFYING OBJECTS IN THE OPERATING ROOM: Brand: WRITESITE® PLUS MINI PREP RESISTANT MARKER

## (undated) DEVICE — SUTURE MONOCRYL STRATAFIX SPRL + SZ 3-0 L18IN ABSRB UD PS-2 SXMP1B107

## (undated) DEVICE — GARMENT,MEDLINE,DVT,INT,CALF,MED, GEN2: Brand: MEDLINE

## (undated) DEVICE — TRAP FLUID

## (undated) DEVICE — GLOVE ORANGE PI 7 1/2   MSG9075

## (undated) DEVICE — SNARES COLD OVAL 10MM THIN

## (undated) DEVICE — SEALANT TISS 10 ML FIBRIN VISTASEAL

## (undated) DEVICE — PLASMABLADE PS210-030S 3.0S LOCK: Brand: PLASMABLADE™

## (undated) DEVICE — 1010 S-DRAPE TOWEL DRAPE 10/BX: Brand: STERI-DRAPE™

## (undated) DEVICE — SUTURE PERMA-HAND 0 L18IN NONABSORBABLE BLK CT-1 L36MM 1/2 C021D

## (undated) DEVICE — STAPLER SKIN H3.9MM WIRE DIA0.58MM CRWN 6.9MM 35 STPL ROT

## (undated) DEVICE — PLASTIC MAJOR: Brand: MEDLINE INDUSTRIES, INC.

## (undated) DEVICE — CONMED PEDIATRIC GASTROSCOPE SHEATHED CYTOLOGY BRUSH, RING HANDLE, 3 MM X 160 CM: Brand: CONMED

## (undated) DEVICE — CANNULA SAMP CO2 AD GRN 7FT CO2 AND 7FT O2 TBNG UNIV CONN